# Patient Record
Sex: MALE | Race: WHITE | NOT HISPANIC OR LATINO | Employment: FULL TIME | URBAN - METROPOLITAN AREA
[De-identification: names, ages, dates, MRNs, and addresses within clinical notes are randomized per-mention and may not be internally consistent; named-entity substitution may affect disease eponyms.]

---

## 2019-09-06 ENCOUNTER — HOSPITAL ENCOUNTER (OUTPATIENT)
Dept: GENERAL RADIOLOGY | Facility: HOSPITAL | Age: 54
Discharge: HOME | End: 2019-09-06

## 2019-09-06 ENCOUNTER — HOSPITAL ENCOUNTER (OUTPATIENT)
Dept: CARDIOLOGY | Facility: HOSPITAL | Age: 54
Discharge: HOME | End: 2019-09-06
Admitting: SPECIALIST

## 2019-09-06 ENCOUNTER — TRANSCRIBE ORDERS (OUTPATIENT)
Dept: CARDIOLOGY | Facility: HOSPITAL | Age: 54
End: 2019-09-06

## 2019-09-06 DIAGNOSIS — R06.09 DOE (DYSPNEA ON EXERTION): ICD-10-CM

## 2019-09-06 DIAGNOSIS — R06.09 DOE (DYSPNEA ON EXERTION): Primary | ICD-10-CM

## 2019-09-06 DIAGNOSIS — R05.9 COUGH: ICD-10-CM

## 2019-09-06 PROCEDURE — 93010 ELECTROCARDIOGRAM REPORT: CPT | Performed by: INTERNAL MEDICINE

## 2019-09-06 PROCEDURE — 71046 X-RAY EXAM CHEST 2 VIEWS: CPT

## 2019-09-06 PROCEDURE — 93005 ELECTROCARDIOGRAM TRACING: CPT | Performed by: SPECIALIST

## 2019-09-23 ENCOUNTER — PREP FOR SURGERY (OUTPATIENT)
Dept: OTHER | Facility: HOSPITAL | Age: 54
End: 2019-09-23

## 2019-09-23 DIAGNOSIS — Z12.11 SCREEN FOR COLON CANCER: Primary | ICD-10-CM

## 2019-09-26 PROBLEM — Z12.11 SCREEN FOR COLON CANCER: Status: ACTIVE | Noted: 2019-09-26

## 2019-12-02 ENCOUNTER — TELEPHONE (OUTPATIENT)
Dept: SURGERY | Facility: CLINIC | Age: 54
End: 2019-12-02

## 2020-09-23 ENCOUNTER — OFFICE VISIT (OUTPATIENT)
Dept: FAMILY MEDICINE CLINIC | Facility: CLINIC | Age: 55
End: 2020-09-23

## 2020-09-23 VITALS
SYSTOLIC BLOOD PRESSURE: 126 MMHG | HEART RATE: 90 BPM | HEIGHT: 78 IN | TEMPERATURE: 97.8 F | OXYGEN SATURATION: 98 % | DIASTOLIC BLOOD PRESSURE: 70 MMHG | WEIGHT: 289.6 LBS | BODY MASS INDEX: 33.51 KG/M2

## 2020-09-23 DIAGNOSIS — E87.5 HYPERKALEMIA: ICD-10-CM

## 2020-09-23 DIAGNOSIS — R79.89 ABNORMAL CBC: Primary | ICD-10-CM

## 2020-09-23 DIAGNOSIS — R73.9 ELEVATED BLOOD SUGAR: ICD-10-CM

## 2020-09-23 DIAGNOSIS — Z72.0 TOBACCO ABUSE: ICD-10-CM

## 2020-09-23 DIAGNOSIS — Z76.89 ENCOUNTER TO ESTABLISH CARE: Primary | ICD-10-CM

## 2020-09-23 DIAGNOSIS — M25.562 CHRONIC PAIN OF LEFT KNEE: ICD-10-CM

## 2020-09-23 DIAGNOSIS — G89.29 CHRONIC PAIN OF LEFT KNEE: ICD-10-CM

## 2020-09-23 LAB
ALBUMIN SERPL-MCNC: 4.6 G/DL (ref 3.5–5.2)
ALBUMIN/GLOB SERPL: 1.9 G/DL
ALP SERPL-CCNC: 111 U/L (ref 39–117)
ALT SERPL W P-5'-P-CCNC: 36 U/L (ref 1–41)
ANION GAP SERPL CALCULATED.3IONS-SCNC: 10.5 MMOL/L (ref 5–15)
ARTICHOKE IGE QN: 115 MG/DL (ref 0–100)
AST SERPL-CCNC: 23 U/L (ref 1–40)
BILIRUB SERPL-MCNC: 0.5 MG/DL (ref 0–1.2)
BUN SERPL-MCNC: 15 MG/DL (ref 6–20)
BUN/CREAT SERPL: 15.8 (ref 7–25)
CALCIUM SPEC-SCNC: 9.5 MG/DL (ref 8.6–10.5)
CHLORIDE SERPL-SCNC: 100 MMOL/L (ref 98–107)
CHOLEST SERPL-MCNC: 231 MG/DL (ref 0–200)
CO2 SERPL-SCNC: 25.5 MMOL/L (ref 22–29)
CREAT SERPL-MCNC: 0.95 MG/DL (ref 0.76–1.27)
ERYTHROCYTE [DISTWIDTH] IN BLOOD BY AUTOMATED COUNT: 12.1 % (ref 12.3–15.4)
GFR SERPL CREATININE-BSD FRML MDRD: 83 ML/MIN/1.73
GLOBULIN UR ELPH-MCNC: 2.4 GM/DL
GLUCOSE SERPL-MCNC: 365 MG/DL (ref 65–99)
HCT VFR BLD AUTO: 52.8 % (ref 37.5–51)
HDLC SERPL-MCNC: 35 MG/DL (ref 40–60)
HGB BLD-MCNC: 17.4 G/DL (ref 13–17.7)
LDLC SERPL CALC-MCNC: ABNORMAL MG/DL
LDLC/HDLC SERPL: ABNORMAL {RATIO}
LYMPHOCYTES # BLD AUTO: 2.9 10*3/MM3 (ref 0.7–3.1)
LYMPHOCYTES NFR BLD AUTO: 32 % (ref 19.6–45.3)
MCH RBC QN AUTO: 30.1 PG (ref 26.6–33)
MCHC RBC AUTO-ENTMCNC: 33 G/DL (ref 31.5–35.7)
MCV RBC AUTO: 91 FL (ref 79–97)
MONOCYTES # BLD AUTO: 0.6 10*3/MM3 (ref 0.1–0.9)
MONOCYTES NFR BLD AUTO: 6.7 % (ref 5–12)
NEUTROPHILS NFR BLD AUTO: 5.6 10*3/MM3 (ref 1.7–7)
NEUTROPHILS NFR BLD AUTO: 61.3 % (ref 42.7–76)
PLATELET # BLD AUTO: 235 10*3/MM3 (ref 140–450)
PMV BLD AUTO: 7.5 FL (ref 6–12)
POTASSIUM SERPL-SCNC: 5.3 MMOL/L (ref 3.5–5.2)
PROT SERPL-MCNC: 7 G/DL (ref 6–8.5)
RBC # BLD AUTO: 5.8 10*6/MM3 (ref 4.14–5.8)
SODIUM SERPL-SCNC: 136 MMOL/L (ref 136–145)
TRIGL SERPL-MCNC: 490 MG/DL (ref 0–150)
TSH SERPL DL<=0.05 MIU/L-ACNC: 0.96 UIU/ML (ref 0.27–4.2)
VLDLC SERPL-MCNC: ABNORMAL MG/DL
WBC # BLD AUTO: 9.1 10*3/MM3 (ref 3.4–10.8)

## 2020-09-23 PROCEDURE — 83721 ASSAY OF BLOOD LIPOPROTEIN: CPT | Performed by: NURSE PRACTITIONER

## 2020-09-23 PROCEDURE — 80061 LIPID PANEL: CPT | Performed by: NURSE PRACTITIONER

## 2020-09-23 PROCEDURE — 80053 COMPREHEN METABOLIC PANEL: CPT | Performed by: NURSE PRACTITIONER

## 2020-09-23 PROCEDURE — 84443 ASSAY THYROID STIM HORMONE: CPT | Performed by: NURSE PRACTITIONER

## 2020-09-23 PROCEDURE — 73560 X-RAY EXAM OF KNEE 1 OR 2: CPT | Performed by: NURSE PRACTITIONER

## 2020-09-23 PROCEDURE — 99204 OFFICE O/P NEW MOD 45 MIN: CPT | Performed by: NURSE PRACTITIONER

## 2020-09-23 PROCEDURE — 36415 COLL VENOUS BLD VENIPUNCTURE: CPT | Performed by: NURSE PRACTITIONER

## 2020-09-23 PROCEDURE — 85025 COMPLETE CBC W/AUTO DIFF WBC: CPT | Performed by: NURSE PRACTITIONER

## 2020-09-23 NOTE — PROGRESS NOTES
Subjective   Marbin Hawkins is a 55 y.o. male.     History of Present Illness   Here today to establish care, prev pcp Dr. Schofield, changing office, he is , he works as , he is expecting one child he stays active, recently lost weight, he states diet is ok. He does smoke 2-3 ppd for about 35 years. He is ready to quit. He has never tried anything for quitting, he denies SOA, cough.   He states he has hx of elevated blood sugar, but states he has lost about 50 lbs. He is fasting today.   Also c/o left knee pain, states he felt pop in knee about 2 years ago, medial knee, he states worsening in the past couple of weeks. Also has left elbow pain, wondering about arthritis. He tried naproxen OTC which helps some. He does have pain with ambulation.    he has never had colonoscopy, last prostate exam last year, normal.       The following portions of the patient's history were reviewed and updated as appropriate: allergies, current medications, past family history, past medical history, past social history, past surgical history and problem list.    Review of Systems   Constitutional: Negative for chills, diaphoresis and fever.   Respiratory: Negative for cough and shortness of breath.    Cardiovascular: Negative for chest pain.   Musculoskeletal: Negative for arthralgias and myalgias.   Neurological: Negative for dizziness, light-headedness and headache.   All other systems reviewed and are negative.      Objective   Physical Exam  Vitals signs and nursing note reviewed.   Constitutional:       Appearance: He is well-developed.   HENT:      Head: Normocephalic.   Eyes:      Pupils: Pupils are equal, round, and reactive to light.   Cardiovascular:      Rate and Rhythm: Normal rate and regular rhythm.      Heart sounds: Normal heart sounds.   Pulmonary:      Effort: Pulmonary effort is normal.      Breath sounds: Normal breath sounds.   Musculoskeletal:      Right knee: He exhibits normal range of motion, no  swelling and no effusion. No tenderness found.      Left knee: He exhibits swelling. He exhibits normal range of motion and no effusion. Tenderness found. Medial joint line tenderness noted.   Skin:     General: Skin is warm and dry.   Neurological:      Mental Status: He is alert and oriented to person, place, and time.   Psychiatric:         Behavior: Behavior normal.         Judgment: Judgment normal.       Left knee xray today for pain, no comparison shows NAD,awaiting radiology over read     Assessment/Plan   Marbin was seen today for establish care and knee pain.    Diagnoses and all orders for this visit:    Encounter to establish care  -     CBC & Differential  -     Comprehensive Metabolic Panel  -     Lipid Panel  -     TSH  -     XR Knee 1 or 2 View Left (In Office)  -     Ambulatory Referral to Orthopedic Surgery  -     CBC Auto Differential    Chronic pain of left knee  -     CBC & Differential  -     Comprehensive Metabolic Panel  -     Lipid Panel  -     TSH  -     XR Knee 1 or 2 View Left (In Office)  -     Ambulatory Referral to Orthopedic Surgery  -     CBC Auto Differential    Left elbow pain  -     CBC & Differential  -     Comprehensive Metabolic Panel  -     Lipid Panel  -     TSH  -     XR Knee 1 or 2 View Left (In Office)  -     Ambulatory Referral to Orthopedic Surgery  -     CBC Auto Differential    Elevated blood sugar  -     CBC & Differential  -     Comprehensive Metabolic Panel  -     Lipid Panel  -     TSH  -     XR Knee 1 or 2 View Left (In Office)  -     Ambulatory Referral to Orthopedic Surgery  -     CBC Auto Differential    Tobacco abuse  -     CBC & Differential  -     Comprehensive Metabolic Panel  -     Lipid Panel  -     TSH  -     XR Knee 1 or 2 View Left (In Office)  -     Ambulatory Referral to Orthopedic Surgery  -     CBC Auto Differential    Other orders  -     varenicline (Chantix Starting Month Pak) 0.5 MG X 11 & 1 MG X 42 tablet; Take 0.5 mg one daily on days 1-3 and  and 0.5 mg twice daily on days 4-7.Then 1 mg twice daily for a total of 12 weeks.          Marbin Hawkins  reports that he has been smoking. He has a 105.00 pack-year smoking history. He has never used smokeless tobacco.. I have educated him on the risk of diseases from using tobacco products such as cancer, COPD and heart diease.     I advised him to quit and he is willing to quit. We have discussed the following method/s for tobacco cessation:  Education Material.  Together we have set a quit date for 1 month from today.  He will follow up with me in 10 weeks or sooner to check on his progress.    I spent 5 minutes counseling the patient.     Labs today will call with results.   Encouraged rest, ice, elevation, may try knee brace OTC as needed, may cont naproxen as needed for pain.   Refer to ortho will call with appt.   Knee xray today will call with results.   Trial chantix starting pack, educated about SE, call with problems, call for refill for continuing pack.   Deferred screening colonoscopy and psa today, educated about colon and prostate cancer screenings.   Patient agrees with plan of care and understands instructions. Call if worsening symptoms or any problems or concerns.

## 2020-09-23 NOTE — PATIENT INSTRUCTIONS
Labs today will call with results.   Encouraged rest, ice, elevation, may try knee brace OTC as needed, may cont naproxen as needed for pain.   Refer to ortho will call with appt.   Knee xray today will call with results.   Trial chantix starting pack, educated about SE, call with problems, call for refill for continuing pack.   Deferred screening colonoscopy and psa today, educated about colon and prostate cancer screenings.   Patient agrees with plan of care and understands instructions. Call if worsening symptoms or any problems or concerns.

## 2020-09-24 DIAGNOSIS — E11.65 TYPE 2 DIABETES MELLITUS WITH HYPERGLYCEMIA, WITHOUT LONG-TERM CURRENT USE OF INSULIN (HCC): Primary | ICD-10-CM

## 2020-09-24 LAB — HBA1C MFR BLD: 10.9 % (ref 4.8–5.6)

## 2020-09-24 PROCEDURE — 83036 HEMOGLOBIN GLYCOSYLATED A1C: CPT | Performed by: NURSE PRACTITIONER

## 2020-09-28 DIAGNOSIS — E78.2 MIXED HYPERLIPIDEMIA: Primary | ICD-10-CM

## 2020-09-28 RX ORDER — ATORVASTATIN CALCIUM 10 MG/1
10 TABLET, FILM COATED ORAL NIGHTLY
Qty: 30 TABLET | Refills: 3 | Status: SHIPPED | OUTPATIENT
Start: 2020-09-28 | End: 2021-01-13 | Stop reason: SDUPTHER

## 2020-10-05 ENCOUNTER — OFFICE VISIT (OUTPATIENT)
Dept: ORTHOPEDIC SURGERY | Facility: CLINIC | Age: 55
End: 2020-10-05

## 2020-10-05 VITALS — WEIGHT: 288.8 LBS | BODY MASS INDEX: 33.41 KG/M2 | HEIGHT: 78 IN | TEMPERATURE: 96.8 F

## 2020-10-05 DIAGNOSIS — M76.892 PES ANSERINUS TENDINITIS OF LEFT LOWER EXTREMITY: Primary | ICD-10-CM

## 2020-10-05 DIAGNOSIS — R52 PAIN: ICD-10-CM

## 2020-10-05 PROCEDURE — 73562 X-RAY EXAM OF KNEE 3: CPT | Performed by: ORTHOPAEDIC SURGERY

## 2020-10-05 PROCEDURE — 99203 OFFICE O/P NEW LOW 30 MIN: CPT | Performed by: ORTHOPAEDIC SURGERY

## 2020-10-05 NOTE — PROGRESS NOTES
General Exam    Patient: Marbin Hawkins    YOB: 1965    Medical Record Number: [id number]        History of Present Illness:     55 y.o. male patient who presents for evaluation of his left knee.  Patient states he has had some medial knee pain that travels up his medial thigh.  He states this is been ongoing for a couple years but really he just noticed it getting worse over the past few months.  He states he has increased his activity level the last few months and he is lost about 45 pounds doing so.  He states stretching his leg in extension makes the symptoms worse as well as with increased activity.  He states with prolonged sitting he feels he can has not loosen his knee up.  Overall the rest does make it better.  He has taken some naproxen which has helped his symptoms as well.  He denies any radiation of pain down his leg no feelings of instability.    Denies any numbness or tingling.  Denies any fevers, cough or shortness of breath.    Allergies: No Known Allergies    Home Medications:      Current Outpatient Medications:   •  atorvastatin (Lipitor) 10 MG tablet, Take 1 tablet by mouth Every Night., Disp: 30 tablet, Rfl: 3  •  metFORMIN (GLUCOPHAGE) 500 MG tablet, Take 1 tablet by mouth 2 (Two) Times a Day With Meals., Disp: 60 tablet, Rfl: 3  •  varenicline (Chantix Starting Month Pak) 0.5 MG X 11 & 1 MG X 42 tablet, Take 0.5 mg one daily on days 1-3 and and 0.5 mg twice daily on days 4-7.Then 1 mg twice daily for a total of 12 weeks., Disp: 52 tablet, Rfl: 0    No past medical history on file.    Past Surgical History:   Procedure Laterality Date   • APPENDECTOMY     • CARPAL TUNNEL RELEASE Left        Social History     Occupational History   • Not on file   Tobacco Use   • Smoking status: Current Every Day Smoker     Packs/day: 3.00     Years: 35.00     Pack years: 105.00   • Smokeless tobacco: Never Used   Substance and Sexual Activity   • Alcohol use: Never     Frequency: Never   • Drug  "use: Never   • Sexual activity: Not on file      Social History     Social History Narrative   • Not on file       Family History   Problem Relation Age of Onset   • Alzheimer's disease Mother    • Heart attack Father    • Uterine cancer Sister    • Lung cancer Brother    • No Known Problems Brother    • No Known Problems Brother        Review of Systems:      Constitutional: Denies fever, shaking or chills   Eyes: Denies change in visual acuity   HEENT: Denies nasal congestion or sore throat   Respiratory: Denies cough or shortness of breath   Cardiovascular: Denies chest pain or edema  Endocrine: Denies tremors, palpitations, intolerance of heat or cold, polyuria, polydipsia.  GI: Denies abdominal pain, nausea, vomiting, bloody stools or diarrhea  : Denies frequency, urgency, incontinence, retention, or nocturia.  Musculoskeletal: Denies numbness, tingling or loss of motor function except as above  Integument: Denies rash, lesion or ulceration   Neurologic: Denies headache or focal weakness, deficits  Heme: Denies spontaneous or excessive bleeding, epistaxis, hematuria, melena, fatigue, enlarged or tender lymph nodes.      All other pertinent positives and negatives as noted above in HPI.    Physical Exam: 55 y.o. male    Vitals:    10/05/20 0906   Temp: 96.8 °F (36 °C)   TempSrc: Temporal   Weight: 131 kg (288 lb 12.8 oz)   Height: 203.2 cm (80\")       General:  Patient is awake and alert.  Appears in no acute distress or discomfort.    Psych:  Affect and demeanor are appropriate.    Eyes:  Conjunctiva and sclera appear grossly normal.  Eyes track well and EOM seem to be intact.    Ears:  No gross abnormalities.  Hearing adequate for the exam.    Cardiovascular:  Regular rate and rhythm.    Lungs:  Good chest expansion.  Breathing unlabored.    Lymph:  No palpable masses or adenopathy in the affected extremity    Musculoskeletal/Extremities:      Left lower extremity: Positive tenderness to palpation along his " hamstring especially the insertion at the peds anserine.  He does have full range of motion 0-1 20+.  He has pain with resisted active knee flexion.  No tenderness to the joint line.  Skin is intact.  Minimal swelling along the insertion of the hamstrings.  Ankle and toes up and down.  Sensation is intact distally light touch.  Skin is intact.         Radiology:       4 views of his left knee were taken the office today.  Taken to evaluate the patient's    AP, lateral, notch view, and sunrise view demonstrate preservation of joint space no osteophyte formation.  No evidence of acute fracture lesions appreciated.    Complaint/s.    AP pelvis was taken the office today to evaluate the patient's complaints.:  Preservation of bilateral hip joint space.  No evidence of fractures or lesions appreciated.     No imaging for comparison.    Assessment/Plan:      Patient has insertional site hamstring tendinitis/pes anserine tendinitis    I discussed the imaging and clinical findings with the patient today.  Feel it is more of a soft tissue overuse injury.  We discussed conservative management consisting of anti-inflammatories, rest, physical therapy.  We will proceed with him continue taken some naproxen over the next few weeks along with doing some formal physical therapy and backing off his exercise activities.  We can consider in the future if needed steroid injection.  The patient is not getting better over the next 6 to 8 weeks he will follow-up.    Otherwise patient will follow-up as needed.               All questions were answered.  Patient understands and agrees with the plan.    Mason Henderson MD    10/05/2020    CC to Shilpi Chiu APRN    Procedures

## 2021-01-12 ENCOUNTER — TELEPHONE (OUTPATIENT)
Dept: FAMILY MEDICINE CLINIC | Facility: CLINIC | Age: 56
End: 2021-01-12

## 2021-01-12 RX ORDER — ATORVASTATIN CALCIUM 10 MG/1
10 TABLET, FILM COATED ORAL NIGHTLY
Qty: 30 TABLET | Refills: 3 | Status: CANCELLED | OUTPATIENT
Start: 2021-01-12

## 2021-01-12 NOTE — TELEPHONE ENCOUNTER
Caller: kinza li    Relationship: Emergency Contact    Best call back number: [phone]    Medication needed:   Requested Prescriptions     Pending Prescriptions Disp Refills   • atorvastatin (Lipitor) 10 MG tablet 30 tablet 3     Sig: Take 1 tablet by mouth Every Night.       When do you need the refill by: 01/15/21    What details did the patient provide when requesting the medication: PATIENT'S WIFE SAID THE PHARMACY ONLY GAVE HIM A PARTIAL REFILL    Does the patient have less than a 3 day supply:  [x] Yes  [] No    What is the patient's preferred pharmacy: [address] ([facility]), [place] - [address] [facility] [phone] [facility] [phone] FX

## 2021-01-13 RX ORDER — ATORVASTATIN CALCIUM 10 MG/1
10 TABLET, FILM COATED ORAL NIGHTLY
Qty: 30 TABLET | Refills: 3 | Status: SHIPPED | OUTPATIENT
Start: 2021-01-13 | End: 2021-01-25 | Stop reason: SDUPTHER

## 2021-01-19 NOTE — TELEPHONE ENCOUNTER
Caller: Marbin Hawkins    Relationship: Self    Best call back number:[phone]   Medication needed:   Requested Prescriptions     Pending Prescriptions Disp Refills   • metFORMIN (GLUCOPHAGE) 500 MG tablet 60 tablet 3     Sig: Take 1 tablet by mouth 2 (Two) Times a Day With Meals.       When do you need the refill by: TODAY   What details did the patient provide when requesting the medication:PATIENT IS OUT OF MEDICATION    Does the patient have less than a 3 day supply:  [x] Yes  [] No    What is the patient's preferred pharmacy: [address] ([facility]), [place] - [address] [facility] [phone] [facility] [phone]

## 2021-01-25 ENCOUNTER — TELEPHONE (OUTPATIENT)
Dept: FAMILY MEDICINE CLINIC | Facility: CLINIC | Age: 56
End: 2021-01-25

## 2021-01-25 RX ORDER — ATORVASTATIN CALCIUM 10 MG/1
10 TABLET, FILM COATED ORAL NIGHTLY
Qty: 30 TABLET | Refills: 3 | Status: SHIPPED | OUTPATIENT
Start: 2021-01-25 | End: 2021-02-03 | Stop reason: SDUPTHER

## 2021-01-25 NOTE — TELEPHONE ENCOUNTER
PATIENT'S WIFE CALLED STATING THAT THE PATIENT NEEDS TO CHANGE BLOOD SUGAR MEDICATION DUE TO HIS CURRENT ONE NOT BEING SUPPORTED ANY MORE.    PLEASE ADVISE  [phone]     [facility] [address] [place] [address] ([facility]), [place] - [address] [facility] [place] - [phone] [facility] [phone]   [phone]

## 2021-01-27 NOTE — TELEPHONE ENCOUNTER
Please have him make appt to discuss and we need to recheck labs, his last visit was 9/2020, due for a1c recheck.

## 2021-02-03 ENCOUNTER — OFFICE VISIT (OUTPATIENT)
Dept: FAMILY MEDICINE CLINIC | Facility: CLINIC | Age: 56
End: 2021-02-03

## 2021-02-03 VITALS
HEART RATE: 85 BPM | DIASTOLIC BLOOD PRESSURE: 70 MMHG | SYSTOLIC BLOOD PRESSURE: 120 MMHG | TEMPERATURE: 98 F | OXYGEN SATURATION: 99 % | WEIGHT: 299 LBS | BODY MASS INDEX: 34.59 KG/M2 | HEIGHT: 78 IN

## 2021-02-03 DIAGNOSIS — E11.65 TYPE 2 DIABETES MELLITUS WITH HYPERGLYCEMIA, WITHOUT LONG-TERM CURRENT USE OF INSULIN (HCC): ICD-10-CM

## 2021-02-03 DIAGNOSIS — E78.5 HYPERLIPIDEMIA, UNSPECIFIED HYPERLIPIDEMIA TYPE: ICD-10-CM

## 2021-02-03 DIAGNOSIS — Z72.0 TOBACCO ABUSE: ICD-10-CM

## 2021-02-03 DIAGNOSIS — Z00.00 ANNUAL PHYSICAL EXAM: Primary | ICD-10-CM

## 2021-02-03 PROCEDURE — 71046 X-RAY EXAM CHEST 2 VIEWS: CPT | Performed by: NURSE PRACTITIONER

## 2021-02-03 PROCEDURE — 93000 ELECTROCARDIOGRAM COMPLETE: CPT | Performed by: NURSE PRACTITIONER

## 2021-02-03 PROCEDURE — 99396 PREV VISIT EST AGE 40-64: CPT | Performed by: NURSE PRACTITIONER

## 2021-02-03 RX ORDER — ATORVASTATIN CALCIUM 10 MG/1
10 TABLET, FILM COATED ORAL NIGHTLY
Qty: 30 TABLET | Refills: 6 | Status: SHIPPED | OUTPATIENT
Start: 2021-02-03 | End: 2021-12-13 | Stop reason: SDUPTHER

## 2021-02-03 NOTE — PROGRESS NOTES
"Chief Complaint  Annual Exam and Diabetes (says was told metformin causes cancer)    Subjective          Marbin Hawkins presents to [facility] for   History of Present Illness  Here today for annual exam, he is not fasting today.  He has been working diet, does exercise occasionally.He did not take chantix, he has cut back on smoking. He is UTD on dentist. UTD on eye exam.    with DM, prev taking metformin 500mg bid, he states he stopped this d/t recall and fear of causing cancer. Last a1c 9/2020 10.9, new diagnosis. States he stopped metformin about 2 weeks ago. States BS in am usually 120s-140, states he did not check today. He is not fasting today.   With HLD, taking lipitor 10mg daily, he is taking daily, tolerating well.   He states he does have family hx of heart attack in father in 50s.   He does see urology he had psa this morning. He has never had colonoscopy. Denies family hx of colon cancer.       Objective   Vital Signs:   /70 (BP Location: Right arm, Patient Position: Sitting, Cuff Size: Large Adult)   Pulse 85   Temp 98 °F (36.7 °C) (Infrared)   Ht 203.2 cm (80\")   Wt 136 kg (299 lb)   SpO2 99%   BMI 32.85 kg/m²     Physical Exam  Vitals signs and nursing note reviewed.   Constitutional:       Appearance: He is well-developed.   HENT:      Head: Normocephalic.   Eyes:      Pupils: Pupils are equal, round, and reactive to light.   Cardiovascular:      Rate and Rhythm: Normal rate and regular rhythm.      Pulses: Normal pulses.           Radial pulses are 2+ on the right side and 2+ on the left side.        Dorsalis pedis pulses are 2+ on the right side and 2+ on the left side.        Posterior tibial pulses are 2+ on the right side and 2+ on the left side.      Heart sounds: Normal heart sounds.   Pulmonary:      Effort: Pulmonary effort is normal.      Breath sounds: Normal breath sounds. No decreased breath sounds, wheezing or rhonchi.   Skin:     " General: Skin is warm and dry.   Neurological:      Mental Status: He is alert and oriented to person, place, and time.      Cranial Nerves: Cranial nerves are intact.      Sensory: Sensation is intact.      Motor: Motor function is intact.      Coordination: Coordination is intact.   Psychiatric:         Behavior: Behavior normal.         Judgment: Judgment normal.        Result Review :            ECG 12 Lead    Date/Time: 2/3/2021 5:04 PM  Performed by: Shilpi Chiu APRN  Authorized by: Shilpi Chiu APRN   Comparison: compared with previous ECG from 9/6/2019  Comparison to previous ECG: Sinus rhythm   Rhythm: sinus rhythm  Rate: normal  QRS axis: normal    Clinical impression: normal ECG            cxr2v in office for physical no comparison shows ,awaiting radiology over read.       Assessment and Plan    Problem List Items Addressed This Visit        Cardiac and Vasculature    Hyperlipidemia    Relevant Medications    atorvastatin (Lipitor) 10 MG tablet    Other Relevant Orders    CBC & Differential    Comprehensive Metabolic Panel    Hemoglobin A1c    Lipid panel    MicroAlbumin, Urine, Random - Urine, Clean Catch    XR Chest 2 View (Completed)      Other Visit Diagnoses     Annual physical exam    -  Primary    Relevant Orders    CBC & Differential    Comprehensive Metabolic Panel    Hemoglobin A1c    Lipid panel    MicroAlbumin, Urine, Random - Urine, Clean Catch    XR Chest 2 View (Completed)    ECG 12 Lead    Type 2 diabetes mellitus with hyperglycemia, without long-term current use of insulin (CMS/[facility])        Relevant Medications    SITagliptin (Januvia) 100 MG tablet    Other Relevant Orders    CBC & Differential    Comprehensive Metabolic Panel    Hemoglobin A1c    Lipid panel    MicroAlbumin, Urine, Random - Urine, Clean Catch    XR Chest 2 View (Completed)    Tobacco abuse        Relevant Orders    CBC & Differential    Comprehensive Metabolic Panel    Hemoglobin A1c    Lipid panel     MicroAlbumin, Urine, Random - Urine, Clean Catch    XR Chest 2 View (Completed)          Follow Up   Return if symptoms worsen or fail to improve.  Patient was given instructions and counseling regarding his condition or for health maintenance advice. Please see specific information pulled into the AVS if appropriate.     He will return Friday for fasting labs,   ekg and cxr today,   He will let me know if he would like stress test ordered d/t family hx   Deferred colonoscopy or cologuard options today, he will call if he would like scheduled.   Encouraged smoking cessation,   Cont diet and exercise as tolerated.  Stop metformin, will trial januvia 100mg daily, coupon given, monitor bS at home, call with problems, cont lipitor.   Patient agrees with plan of care and understands instructions. Call if worsening symptoms or any problems or concerns.

## 2021-02-03 NOTE — PATIENT INSTRUCTIONS
He will return Friday for fasting labs,   ekg and cxr today,   He will let me know if he would like stress test ordered d/t family hx   Deferred colonoscopy or cologuard options today, he will call if he would like scheduled.   Encouraged smoking cessation,   Cont diet and exercise as tolerated.  Stop metformin, will trial januvia 100mg daily, coupon given, monitor bS at home, call with problems, cont lipitor.   Patient agrees with plan of care and understands instructions. Call if worsening symptoms or any problems or concerns.

## 2021-02-05 ENCOUNTER — TELEPHONE (OUTPATIENT)
Dept: FAMILY MEDICINE CLINIC | Facility: CLINIC | Age: 56
End: 2021-02-05

## 2021-02-05 ENCOUNTER — LAB (OUTPATIENT)
Dept: FAMILY MEDICINE CLINIC | Facility: CLINIC | Age: 56
End: 2021-02-05

## 2021-02-05 DIAGNOSIS — E11.65 TYPE 2 DIABETES MELLITUS WITH HYPERGLYCEMIA, WITHOUT LONG-TERM CURRENT USE OF INSULIN (HCC): ICD-10-CM

## 2021-02-05 DIAGNOSIS — E78.2 MIXED HYPERLIPIDEMIA: ICD-10-CM

## 2021-02-05 DIAGNOSIS — E87.5 HYPERKALEMIA: ICD-10-CM

## 2021-02-05 DIAGNOSIS — Z72.0 TOBACCO ABUSE: ICD-10-CM

## 2021-02-05 DIAGNOSIS — R79.89 ABNORMAL CBC: ICD-10-CM

## 2021-02-05 DIAGNOSIS — Z00.00 ANNUAL PHYSICAL EXAM: ICD-10-CM

## 2021-02-05 DIAGNOSIS — E78.5 HYPERLIPIDEMIA, UNSPECIFIED HYPERLIPIDEMIA TYPE: ICD-10-CM

## 2021-02-05 LAB
ALBUMIN SERPL-MCNC: 4.6 G/DL (ref 3.5–5.2)
ALBUMIN UR-MCNC: 0 MG/L (ref 0–20)
ALBUMIN/GLOB SERPL: 2.1 G/DL
ALP SERPL-CCNC: 76 U/L (ref 39–117)
ALT SERPL W P-5'-P-CCNC: 19 U/L (ref 1–41)
ANION GAP SERPL CALCULATED.3IONS-SCNC: 10 MMOL/L (ref 5–15)
AST SERPL-CCNC: 17 U/L (ref 1–40)
BILIRUB SERPL-MCNC: 0.7 MG/DL (ref 0–1.2)
BUN SERPL-MCNC: 9 MG/DL (ref 6–20)
BUN/CREAT SERPL: 11.1 (ref 7–25)
CALCIUM SPEC-SCNC: 9.7 MG/DL (ref 8.6–10.5)
CHLORIDE SERPL-SCNC: 103 MMOL/L (ref 98–107)
CHOLEST SERPL-MCNC: 179 MG/DL (ref 0–200)
CO2 SERPL-SCNC: 27 MMOL/L (ref 22–29)
CREAT SERPL-MCNC: 0.81 MG/DL (ref 0.76–1.27)
ERYTHROCYTE [DISTWIDTH] IN BLOOD BY AUTOMATED COUNT: 11.7 % (ref 12.3–15.4)
GFR SERPL CREATININE-BSD FRML MDRD: 99 ML/MIN/1.73
GLOBULIN UR ELPH-MCNC: 2.2 GM/DL
GLUCOSE SERPL-MCNC: 156 MG/DL (ref 65–99)
HBA1C MFR BLD: 6.3 % (ref 4.8–5.6)
HCT VFR BLD AUTO: 50.7 % (ref 37.5–51)
HDLC SERPL-MCNC: 45 MG/DL (ref 40–60)
HGB BLD-MCNC: 16.7 G/DL (ref 13–17.7)
LDLC SERPL CALC-MCNC: 92 MG/DL (ref 0–100)
LDLC/HDLC SERPL: 1.86 {RATIO}
LYMPHOCYTES # BLD AUTO: 2.9 10*3/MM3 (ref 0.7–3.1)
LYMPHOCYTES NFR BLD AUTO: 33.2 % (ref 19.6–45.3)
MCH RBC QN AUTO: 30.2 PG (ref 26.6–33)
MCHC RBC AUTO-ENTMCNC: 32.9 G/DL (ref 31.5–35.7)
MCV RBC AUTO: 91.9 FL (ref 79–97)
MONOCYTES # BLD AUTO: 0.7 10*3/MM3 (ref 0.1–0.9)
MONOCYTES NFR BLD AUTO: 7.6 % (ref 5–12)
NEUTROPHILS NFR BLD AUTO: 5.2 10*3/MM3 (ref 1.7–7)
NEUTROPHILS NFR BLD AUTO: 59.2 % (ref 42.7–76)
PLATELET # BLD AUTO: 225 10*3/MM3 (ref 140–450)
PMV BLD AUTO: 7.2 FL (ref 6–12)
POTASSIUM SERPL-SCNC: 4.9 MMOL/L (ref 3.5–5.2)
PROT SERPL-MCNC: 6.8 G/DL (ref 6–8.5)
RBC # BLD AUTO: 5.52 10*6/MM3 (ref 4.14–5.8)
SODIUM SERPL-SCNC: 140 MMOL/L (ref 136–145)
TRIGL SERPL-MCNC: 252 MG/DL (ref 0–150)
VLDLC SERPL-MCNC: 42 MG/DL (ref 5–40)
WBC # BLD AUTO: 8.7 10*3/MM3 (ref 3.4–10.8)

## 2021-02-05 PROCEDURE — 82043 UR ALBUMIN QUANTITATIVE: CPT | Performed by: NURSE PRACTITIONER

## 2021-02-05 PROCEDURE — 36415 COLL VENOUS BLD VENIPUNCTURE: CPT | Performed by: NURSE PRACTITIONER

## 2021-02-05 PROCEDURE — 83036 HEMOGLOBIN GLYCOSYLATED A1C: CPT | Performed by: NURSE PRACTITIONER

## 2021-02-05 PROCEDURE — 80061 LIPID PANEL: CPT | Performed by: NURSE PRACTITIONER

## 2021-02-05 PROCEDURE — 85025 COMPLETE CBC W/AUTO DIFF WBC: CPT | Performed by: NURSE PRACTITIONER

## 2021-02-05 PROCEDURE — 80053 COMPREHEN METABOLIC PANEL: CPT | Performed by: NURSE PRACTITIONER

## 2021-02-05 NOTE — TELEPHONE ENCOUNTER
Hub staff attempted to follow warm transfer process and was unsuccessful     Caller: Marbin Hawkins    Relationship to patient: Self    Best call back number: [phone]    Patient is needing: PATIENT RETURNING CALL HE MISSED EARLIER, WOULD LIKE CALLBACK

## 2021-02-05 NOTE — TELEPHONE ENCOUNTER
Caller: Marbin Hawkins    Relationship to patient: Self    Best call back number: [phone] (M)    Patient is needing: PATIENT RETUNING SOMEONE'S CALL COULD NOT VERIFY WHO CALLED HIM

## 2021-03-26 ENCOUNTER — BULK ORDERING (OUTPATIENT)
Dept: CASE MANAGEMENT | Facility: OTHER | Age: 56
End: 2021-03-26

## 2021-03-26 DIAGNOSIS — Z23 IMMUNIZATION DUE: ICD-10-CM

## 2021-03-30 ENCOUNTER — IMMUNIZATION (OUTPATIENT)
Dept: VACCINE CLINIC | Facility: HOSPITAL | Age: 56
End: 2021-03-30

## 2021-03-30 DIAGNOSIS — Z23 IMMUNIZATION DUE: ICD-10-CM

## 2021-03-30 PROCEDURE — 0001A: CPT | Performed by: INTERNAL MEDICINE

## 2021-03-30 PROCEDURE — 91300 HC SARSCOV02 VAC 30MCG/0.3ML IM: CPT | Performed by: INTERNAL MEDICINE

## 2021-04-20 ENCOUNTER — IMMUNIZATION (OUTPATIENT)
Dept: VACCINE CLINIC | Facility: HOSPITAL | Age: 56
End: 2021-04-20

## 2021-04-20 PROCEDURE — 0002A: CPT | Performed by: INTERNAL MEDICINE

## 2021-04-20 PROCEDURE — 91300 HC SARSCOV02 VAC 30MCG/0.3ML IM: CPT | Performed by: INTERNAL MEDICINE

## 2021-06-28 ENCOUNTER — TELEPHONE (OUTPATIENT)
Dept: FAMILY MEDICINE CLINIC | Facility: CLINIC | Age: 56
End: 2021-06-28

## 2021-06-28 DIAGNOSIS — K04.7 DENTAL ABSCESS: Primary | ICD-10-CM

## 2021-06-28 RX ORDER — AMOXICILLIN 500 MG/1
1000 CAPSULE ORAL 2 TIMES DAILY
Qty: 4 CAPSULE | Refills: 0 | Status: SHIPPED | OUTPATIENT
Start: 2021-06-28 | End: 2021-06-29

## 2021-06-28 NOTE — TELEPHONE ENCOUNTER
Caller: kinza li    Relationship: Emergency Contact    Best call back number: [phone]    Medication needed: ANTIBIOTIC AND PAIN MEDICATION      When do you need the refill by: 06/28/21    What additional details did the patient provide when requesting the medication: PATIENT'S WIFE IS CALLING TO STATE PATIENT IS EXPERIENCING TOOTH PAIN.  SHE STATES HE IS GOING TO HAVE EXTRACTIONS AND IMPLANTS BUT NEEDS SOMETHING TO CLEAR UP THE INFECTION AND PAIN  Does the patient have less than a 3 day supply:  [x] Yes  [] No    What is the patient's preferred pharmacy:      [address] ([facility]), [place] - [address] [facility] [phone] [facility] [phone]   [phone]    PLEASE ADVISE

## 2021-06-28 NOTE — TELEPHONE ENCOUNTER
He doesn't have dental insurance, he is going to [place] to have this done but will need antibiotic first

## 2021-11-02 ENCOUNTER — TELEPHONE (OUTPATIENT)
Dept: FAMILY MEDICINE CLINIC | Facility: CLINIC | Age: 56
End: 2021-11-02

## 2021-11-02 RX ORDER — NAPROXEN 500 MG/1
500 TABLET ORAL 2 TIMES DAILY WITH MEALS
Qty: 60 TABLET | Refills: 3 | Status: SHIPPED | OUTPATIENT
Start: 2021-11-02

## 2021-11-02 NOTE — TELEPHONE ENCOUNTER
Caller: Marbin Hawkins    Relationship: Self    Best call back number: [phone]    What medication are you requesting: NAPROXEN-ANOTHER DR PRESCRIBED THIS    Have you had these symptoms before:    [x] Yes  [] No    Have you been treated for these symptoms before:   [x] Yes  [] No    If a prescription is needed, what is your preferred pharmacy and phone number: [address] ([facility]), [place] - [address] [facility] [phone] [facility] [phone]

## 2021-12-13 ENCOUNTER — OFFICE VISIT (OUTPATIENT)
Dept: FAMILY MEDICINE CLINIC | Facility: CLINIC | Age: 56
End: 2021-12-13

## 2021-12-13 VITALS
TEMPERATURE: 98.2 F | HEIGHT: 78 IN | RESPIRATION RATE: 20 BRPM | OXYGEN SATURATION: 96 % | HEART RATE: 90 BPM | DIASTOLIC BLOOD PRESSURE: 70 MMHG | WEIGHT: 307 LBS | SYSTOLIC BLOOD PRESSURE: 120 MMHG | BODY MASS INDEX: 35.52 KG/M2

## 2021-12-13 DIAGNOSIS — E11.9 TYPE 2 DIABETES MELLITUS WITHOUT COMPLICATION, WITHOUT LONG-TERM CURRENT USE OF INSULIN (HCC): Primary | ICD-10-CM

## 2021-12-13 DIAGNOSIS — E78.5 HYPERLIPIDEMIA, UNSPECIFIED HYPERLIPIDEMIA TYPE: ICD-10-CM

## 2021-12-13 LAB
ALBUMIN SERPL-MCNC: 4.5 G/DL (ref 3.5–5.2)
ALBUMIN/GLOB SERPL: 2 G/DL
ALP SERPL-CCNC: 85 U/L (ref 39–117)
ALT SERPL W P-5'-P-CCNC: 17 U/L (ref 1–41)
ANION GAP SERPL CALCULATED.3IONS-SCNC: 6.5 MMOL/L (ref 5–15)
AST SERPL-CCNC: 14 U/L (ref 1–40)
BILIRUB SERPL-MCNC: 0.6 MG/DL (ref 0–1.2)
BUN SERPL-MCNC: 11 MG/DL (ref 6–20)
BUN/CREAT SERPL: 15.3 (ref 7–25)
CALCIUM SPEC-SCNC: 9.5 MG/DL (ref 8.6–10.5)
CHLORIDE SERPL-SCNC: 104 MMOL/L (ref 98–107)
CHOLEST SERPL-MCNC: 193 MG/DL (ref 0–200)
CO2 SERPL-SCNC: 27.5 MMOL/L (ref 22–29)
CREAT SERPL-MCNC: 0.72 MG/DL (ref 0.76–1.27)
ERYTHROCYTE [DISTWIDTH] IN BLOOD BY AUTOMATED COUNT: 12.1 % (ref 12.3–15.4)
GFR SERPL CREATININE-BSD FRML MDRD: 113 ML/MIN/1.73
GLOBULIN UR ELPH-MCNC: 2.3 GM/DL
GLUCOSE SERPL-MCNC: 219 MG/DL (ref 65–99)
HBA1C MFR BLD: 7.35 % (ref 4.8–5.6)
HCT VFR BLD AUTO: 48.3 % (ref 37.5–51)
HDLC SERPL-MCNC: 38 MG/DL (ref 40–60)
HGB BLD-MCNC: 16.1 G/DL (ref 13–17.7)
LDLC SERPL CALC-MCNC: 109 MG/DL (ref 0–100)
LDLC/HDLC SERPL: 2.68 {RATIO}
LYMPHOCYTES # BLD AUTO: 2.7 10*3/MM3 (ref 0.7–3.1)
LYMPHOCYTES NFR BLD AUTO: 40.9 % (ref 19.6–45.3)
MCH RBC QN AUTO: 29.9 PG (ref 26.6–33)
MCHC RBC AUTO-ENTMCNC: 33.3 G/DL (ref 31.5–35.7)
MCV RBC AUTO: 89.7 FL (ref 79–97)
MONOCYTES # BLD AUTO: 0.4 10*3/MM3 (ref 0.1–0.9)
MONOCYTES NFR BLD AUTO: 5.9 % (ref 5–12)
NEUTROPHILS NFR BLD AUTO: 3.5 10*3/MM3 (ref 1.7–7)
NEUTROPHILS NFR BLD AUTO: 53.2 % (ref 42.7–76)
PLATELET # BLD AUTO: 232 10*3/MM3 (ref 140–450)
PMV BLD AUTO: 7.4 FL (ref 6–12)
POTASSIUM SERPL-SCNC: 4.6 MMOL/L (ref 3.5–5.2)
PROT SERPL-MCNC: 6.8 G/DL (ref 6–8.5)
RBC # BLD AUTO: 5.39 10*6/MM3 (ref 4.14–5.8)
SODIUM SERPL-SCNC: 138 MMOL/L (ref 136–145)
TRIGL SERPL-MCNC: 265 MG/DL (ref 0–150)
TSH SERPL DL<=0.05 MIU/L-ACNC: 1.11 UIU/ML (ref 0.27–4.2)
VLDLC SERPL-MCNC: 46 MG/DL (ref 5–40)
WBC NRBC COR # BLD: 6.5 10*3/MM3 (ref 3.4–10.8)

## 2021-12-13 PROCEDURE — 84443 ASSAY THYROID STIM HORMONE: CPT | Performed by: NURSE PRACTITIONER

## 2021-12-13 PROCEDURE — 83036 HEMOGLOBIN GLYCOSYLATED A1C: CPT | Performed by: NURSE PRACTITIONER

## 2021-12-13 PROCEDURE — 80061 LIPID PANEL: CPT | Performed by: NURSE PRACTITIONER

## 2021-12-13 PROCEDURE — 85025 COMPLETE CBC W/AUTO DIFF WBC: CPT | Performed by: NURSE PRACTITIONER

## 2021-12-13 PROCEDURE — 99214 OFFICE O/P EST MOD 30 MIN: CPT | Performed by: NURSE PRACTITIONER

## 2021-12-13 PROCEDURE — 80053 COMPREHEN METABOLIC PANEL: CPT | Performed by: NURSE PRACTITIONER

## 2021-12-13 RX ORDER — ATORVASTATIN CALCIUM 10 MG/1
10 TABLET, FILM COATED ORAL NIGHTLY
Qty: 30 TABLET | Refills: 6 | Status: SHIPPED | OUTPATIENT
Start: 2021-12-13

## 2021-12-13 RX ORDER — TESTOSTERONE CYPIONATE 100 MG/ML
100 INJECTION, SOLUTION INTRAMUSCULAR
COMMUNITY
Start: 2021-09-23

## 2021-12-13 NOTE — PATIENT INSTRUCTIONS
Labs today will call with results.   Handout for DM education classes given,   Work on low sugar and carb diet, exercise such as walking as tolerated.   Cont to monitor BS at home,   Pending labs will resume meds.   Patient agrees with plan of care and understands instructions. Call if worsening symptoms or any problems or concerns.

## 2021-12-13 NOTE — PROGRESS NOTES
"Chief Complaint  Diabetes (been out of Januvia months)    Subjective          Marbin Hawkins presents to [facility]  History of Present Illness  Here today for f/u, with DM. [facility], last a1c 2/2021 6.3, he check BS at home at times, usually 170s-180s.  he states he has gained weight. He is working on diet.   With HLD prev taking lipitor 10mg daily, he is not fasting today. He stopped about 2 months ago.         Objective   Vital Signs:   /70 (BP Location: Left arm, Patient Position: Sitting)   Pulse 90   Temp 98.2 °F (36.8 °C) (Infrared)   Resp 20   Ht 203.2 cm (80\")   Wt (!) 139 kg (307 lb)   SpO2 96%   BMI 33.73 kg/m²     Physical Exam  Vitals and nursing note reviewed.   Constitutional:       Appearance: He is well-developed.   HENT:      Head: Normocephalic.   Eyes:      Pupils: Pupils are equal, round, and reactive to light.   Cardiovascular:      Rate and Rhythm: Normal rate and regular rhythm.      Heart sounds: Normal heart sounds.   Pulmonary:      Effort: Pulmonary effort is normal.      Breath sounds: Normal breath sounds.   Skin:     General: Skin is warm and dry.   Neurological:      Mental Status: He is alert and oriented to person, place, and time.   Psychiatric:         Behavior: Behavior normal.         Judgment: Judgment normal.        Result Review :                 Assessment and Plan    Diagnoses and all orders for this visit:    1. Type 2 diabetes mellitus without complication, without long-term current use of insulin (HCC) (Primary)  -     Comprehensive Metabolic Panel  -     CBC & Differential  -     Lipid Panel  -     TSH  -     Hemoglobin A1c    2. Hyperlipidemia, unspecified hyperlipidemia type  -     Comprehensive Metabolic Panel  -     CBC & Differential  -     Lipid Panel  -     TSH  -     Hemoglobin A1c        Follow Up   Return in about 3 months (around 3/13/2022), or if symptoms worsen or fail to " improve, for Recheck.  Patient was given instructions and counseling regarding his condition or for health maintenance advice. Please see specific information pulled into the AVS if appropriate.     Labs today will call with results.   Handout for DM education classes given,   Work on low sugar and carb diet, exercise such as walking as tolerated.   Cont to monitor BS at home,   Pending labs will resume meds.   Patient agrees with plan of care and understands instructions. Call if worsening symptoms or any problems or concerns.

## 2022-02-02 ENCOUNTER — TELEPHONE (OUTPATIENT)
Dept: FAMILY MEDICINE CLINIC | Facility: CLINIC | Age: 57
End: 2022-02-02

## 2022-02-02 NOTE — TELEPHONE ENCOUNTER
Caller: Marbin Hawkins    Relationship: Self    Best call back number: [phone]    What was the call regarding: PATIENT STATED THAT WHEN PATIENT SPOKE WITH HIS PHARMACY, HE WAS TOLD THE PRESCRIPTION FOR CANNOT BE FILLED UNTIL MS ROJAS MORILLO IS ABLE TO CALL HIS INSURANCE, PASSPORT, AND TELL THEM AN EXPLANATION OF WHY PATIENT IS ON THE MEDICATION. PLEASE ADVISE.     Do you require a callback: IF ANY QUESTIONS

## 2024-08-24 ENCOUNTER — HOSPITAL ENCOUNTER (OUTPATIENT)
Facility: HOSPITAL | Age: 59
Setting detail: OBSERVATION
Discharge: HOME | End: 2024-08-27
Attending: EMERGENCY MEDICINE | Admitting: INTERNAL MEDICINE
Payer: MEDICAID

## 2024-08-24 ENCOUNTER — APPOINTMENT (OUTPATIENT)
Dept: GENERAL RADIOLOGY | Facility: HOSPITAL | Age: 59
End: 2024-08-24
Payer: MEDICAID

## 2024-08-24 DIAGNOSIS — E11.621 DIABETIC ULCER OF TOE OF RIGHT FOOT ASSOCIATED WITH TYPE 2 DIABETES MELLITUS, WITH OTHER ULCER SEVERITY: ICD-10-CM

## 2024-08-24 DIAGNOSIS — L97.519 DIABETIC ULCER OF TOE OF RIGHT FOOT ASSOCIATED WITH DIABETES MELLITUS DUE TO UNDERLYING CONDITION, UNSPECIFIED ULCER STAGE: Primary | ICD-10-CM

## 2024-08-24 DIAGNOSIS — L97.518 DIABETIC ULCER OF TOE OF RIGHT FOOT ASSOCIATED WITH TYPE 2 DIABETES MELLITUS, WITH OTHER ULCER SEVERITY: ICD-10-CM

## 2024-08-24 DIAGNOSIS — E08.621 DIABETIC ULCER OF TOE OF RIGHT FOOT ASSOCIATED WITH DIABETES MELLITUS DUE TO UNDERLYING CONDITION, UNSPECIFIED ULCER STAGE: Primary | ICD-10-CM

## 2024-08-24 PROBLEM — S90.122A: Status: ACTIVE | Noted: 2024-08-24

## 2024-08-24 PROBLEM — Z72.0 TOBACCO USE: Status: ACTIVE | Noted: 2024-08-24

## 2024-08-24 PROBLEM — E78.5 HYPERLIPIDEMIA: Status: ACTIVE | Noted: 2024-08-24

## 2024-08-24 PROBLEM — L97.509 DIABETIC FOOT ULCER: Status: ACTIVE | Noted: 2024-08-24

## 2024-08-24 LAB
ALBUMIN SERPL-MCNC: 4.2 G/DL (ref 3.5–5.2)
ALBUMIN/GLOB SERPL: 1.5 G/DL
ALP SERPL-CCNC: 79 U/L (ref 39–117)
ALT SERPL W P-5'-P-CCNC: 16 U/L (ref 1–41)
ANION GAP SERPL CALCULATED.3IONS-SCNC: 10 MMOL/L (ref 5–15)
AST SERPL-CCNC: 14 U/L (ref 1–40)
BASOPHILS # BLD AUTO: 0.03 10*3/MM3 (ref 0–0.2)
BASOPHILS NFR BLD AUTO: 0.4 % (ref 0–1.5)
BILIRUB SERPL-MCNC: 0.4 MG/DL (ref 0–1.2)
BUN SERPL-MCNC: 13 MG/DL (ref 6–20)
BUN/CREAT SERPL: 21.3 (ref 7–25)
CALCIUM SPEC-SCNC: 9.1 MG/DL (ref 8.6–10.5)
CHLORIDE SERPL-SCNC: 104 MMOL/L (ref 98–107)
CO2 SERPL-SCNC: 25 MMOL/L (ref 22–29)
CREAT SERPL-MCNC: 0.61 MG/DL (ref 0.76–1.27)
D-LACTATE SERPL-SCNC: 1.1 MMOL/L (ref 0.5–2)
DEPRECATED RDW RBC AUTO: 41 FL (ref 37–54)
EGFRCR SERPLBLD CKD-EPI 2021: 111.3 ML/MIN/1.73
EOSINOPHIL # BLD AUTO: 0.34 10*3/MM3 (ref 0–0.4)
EOSINOPHIL NFR BLD AUTO: 4.4 % (ref 0.3–6.2)
ERYTHROCYTE [DISTWIDTH] IN BLOOD BY AUTOMATED COUNT: 12.2 % (ref 12.3–15.4)
ERYTHROCYTE [SEDIMENTATION RATE] IN BLOOD: 28 MM/HR (ref 0–20)
GLOBULIN UR ELPH-MCNC: 2.8 GM/DL
GLUCOSE BLDC GLUCOMTR-MCNC: 127 MG/DL (ref 70–130)
GLUCOSE BLDC GLUCOMTR-MCNC: 168 MG/DL (ref 70–130)
GLUCOSE SERPL-MCNC: 99 MG/DL (ref 65–99)
HBA1C MFR BLD: 5.9 % (ref 4.8–5.6)
HCT VFR BLD AUTO: 42.1 % (ref 37.5–51)
HGB BLD-MCNC: 14.1 G/DL (ref 13–17.7)
HOLD SPECIMEN: NORMAL
IMM GRANULOCYTES # BLD AUTO: 0.04 10*3/MM3 (ref 0–0.05)
IMM GRANULOCYTES NFR BLD AUTO: 0.5 % (ref 0–0.5)
LYMPHOCYTES # BLD AUTO: 2.65 10*3/MM3 (ref 0.7–3.1)
LYMPHOCYTES NFR BLD AUTO: 33.9 % (ref 19.6–45.3)
MCH RBC QN AUTO: 30.7 PG (ref 26.6–33)
MCHC RBC AUTO-ENTMCNC: 33.5 G/DL (ref 31.5–35.7)
MCV RBC AUTO: 91.5 FL (ref 79–97)
MONOCYTES # BLD AUTO: 0.77 10*3/MM3 (ref 0.1–0.9)
MONOCYTES NFR BLD AUTO: 9.9 % (ref 5–12)
MRSA DNA SPEC QL NAA+PROBE: NORMAL
NEUTROPHILS NFR BLD AUTO: 3.98 10*3/MM3 (ref 1.7–7)
NEUTROPHILS NFR BLD AUTO: 50.9 % (ref 42.7–76)
NRBC BLD AUTO-RTO: 0 /100 WBC (ref 0–0.2)
PLATELET # BLD AUTO: 216 10*3/MM3 (ref 140–450)
PMV BLD AUTO: 9 FL (ref 6–12)
POTASSIUM SERPL-SCNC: 4.1 MMOL/L (ref 3.5–5.2)
PROT SERPL-MCNC: 7 G/DL (ref 6–8.5)
RBC # BLD AUTO: 4.6 10*6/MM3 (ref 4.14–5.8)
SODIUM SERPL-SCNC: 139 MMOL/L (ref 136–145)
WBC NRBC COR # BLD AUTO: 7.81 10*3/MM3 (ref 3.4–10.8)
WHOLE BLOOD HOLD SPECIMEN: NORMAL

## 2024-08-24 PROCEDURE — 96367 TX/PROPH/DG ADDL SEQ IV INF: CPT

## 2024-08-24 PROCEDURE — 87186 SC STD MICRODIL/AGAR DIL: CPT | Performed by: INTERNAL MEDICINE

## 2024-08-24 PROCEDURE — 87641 MR-STAPH DNA AMP PROBE: CPT | Performed by: INTERNAL MEDICINE

## 2024-08-24 PROCEDURE — 25810000003 SODIUM CHLORIDE 0.9 % SOLUTION 250 ML FLEX CONT: Performed by: INTERNAL MEDICINE

## 2024-08-24 PROCEDURE — 96365 THER/PROPH/DIAG IV INF INIT: CPT

## 2024-08-24 PROCEDURE — 25010000002 VANCOMYCIN 10 G RECONSTITUTED SOLUTION: Performed by: EMERGENCY MEDICINE

## 2024-08-24 PROCEDURE — 87070 CULTURE OTHR SPECIMN AEROBIC: CPT | Performed by: INTERNAL MEDICINE

## 2024-08-24 PROCEDURE — 87205 SMEAR GRAM STAIN: CPT | Performed by: INTERNAL MEDICINE

## 2024-08-24 PROCEDURE — 80053 COMPREHEN METABOLIC PANEL: CPT | Performed by: EMERGENCY MEDICINE

## 2024-08-24 PROCEDURE — 25010000002 HEPARIN (PORCINE) PER 1000 UNITS: Performed by: INTERNAL MEDICINE

## 2024-08-24 PROCEDURE — G0378 HOSPITAL OBSERVATION PER HR: HCPCS

## 2024-08-24 PROCEDURE — 87040 BLOOD CULTURE FOR BACTERIA: CPT | Performed by: EMERGENCY MEDICINE

## 2024-08-24 PROCEDURE — 96372 THER/PROPH/DIAG INJ SC/IM: CPT

## 2024-08-24 PROCEDURE — 83036 HEMOGLOBIN GLYCOSYLATED A1C: CPT | Performed by: INTERNAL MEDICINE

## 2024-08-24 PROCEDURE — 85652 RBC SED RATE AUTOMATED: CPT | Performed by: EMERGENCY MEDICINE

## 2024-08-24 PROCEDURE — 36415 COLL VENOUS BLD VENIPUNCTURE: CPT

## 2024-08-24 PROCEDURE — 82948 REAGENT STRIP/BLOOD GLUCOSE: CPT

## 2024-08-24 PROCEDURE — 96361 HYDRATE IV INFUSION ADD-ON: CPT

## 2024-08-24 PROCEDURE — 73660 X-RAY EXAM OF TOE(S): CPT

## 2024-08-24 PROCEDURE — 63710000001 INSULIN LISPRO (HUMAN) PER 5 UNITS: Performed by: INTERNAL MEDICINE

## 2024-08-24 PROCEDURE — 25010000002 PIPERACILLIN SOD-TAZOBACTAM PER 1 G: Performed by: INTERNAL MEDICINE

## 2024-08-24 PROCEDURE — 99285 EMERGENCY DEPT VISIT HI MDM: CPT

## 2024-08-24 PROCEDURE — 25010000002 VANCOMYCIN HCL 1.25 G RECONSTITUTED SOLUTION 1 EACH VIAL: Performed by: INTERNAL MEDICINE

## 2024-08-24 PROCEDURE — 25010000002 PIPERACILLIN SOD-TAZOBACTAM PER 1 G: Performed by: EMERGENCY MEDICINE

## 2024-08-24 PROCEDURE — 85025 COMPLETE CBC W/AUTO DIFF WBC: CPT | Performed by: EMERGENCY MEDICINE

## 2024-08-24 PROCEDURE — 83605 ASSAY OF LACTIC ACID: CPT | Performed by: EMERGENCY MEDICINE

## 2024-08-24 PROCEDURE — 87077 CULTURE AEROBIC IDENTIFY: CPT | Performed by: INTERNAL MEDICINE

## 2024-08-24 PROCEDURE — 25810000003 SODIUM CHLORIDE 0.9 % SOLUTION: Performed by: EMERGENCY MEDICINE

## 2024-08-24 PROCEDURE — 25810000003 SODIUM CHLORIDE 0.9 % SOLUTION: Performed by: INTERNAL MEDICINE

## 2024-08-24 RX ORDER — HEPARIN SODIUM 5000 [USP'U]/ML
5000 INJECTION, SOLUTION INTRAVENOUS; SUBCUTANEOUS EVERY 8 HOURS SCHEDULED
Status: DISCONTINUED | OUTPATIENT
Start: 2024-08-24 | End: 2024-08-27 | Stop reason: HOSPADM

## 2024-08-24 RX ORDER — NICOTINE POLACRILEX 4 MG
15 LOZENGE BUCCAL
Status: DISCONTINUED | OUTPATIENT
Start: 2024-08-24 | End: 2024-08-27 | Stop reason: HOSPADM

## 2024-08-24 RX ORDER — AMOXICILLIN 250 MG
2 CAPSULE ORAL 2 TIMES DAILY
Status: DISCONTINUED | OUTPATIENT
Start: 2024-08-24 | End: 2024-08-27 | Stop reason: HOSPADM

## 2024-08-24 RX ORDER — ONDANSETRON 2 MG/ML
4 INJECTION INTRAMUSCULAR; INTRAVENOUS EVERY 6 HOURS PRN
Status: DISCONTINUED | OUTPATIENT
Start: 2024-08-24 | End: 2024-08-27 | Stop reason: HOSPADM

## 2024-08-24 RX ORDER — SODIUM CHLORIDE 0.9 % (FLUSH) 0.9 %
10 SYRINGE (ML) INJECTION EVERY 12 HOURS SCHEDULED
Status: DISCONTINUED | OUTPATIENT
Start: 2024-08-24 | End: 2024-08-27 | Stop reason: HOSPADM

## 2024-08-24 RX ORDER — FAMOTIDINE 20 MG/1
40 TABLET, FILM COATED ORAL DAILY
Status: DISCONTINUED | OUTPATIENT
Start: 2024-08-24 | End: 2024-08-27 | Stop reason: HOSPADM

## 2024-08-24 RX ORDER — SODIUM CHLORIDE 9 MG/ML
40 INJECTION, SOLUTION INTRAVENOUS AS NEEDED
Status: DISCONTINUED | OUTPATIENT
Start: 2024-08-24 | End: 2024-08-27 | Stop reason: HOSPADM

## 2024-08-24 RX ORDER — GLIPIZIDE 5 MG/1
5 TABLET ORAL
COMMUNITY
Start: 2024-06-07 | End: 2024-09-05

## 2024-08-24 RX ORDER — POLYETHYLENE GLYCOL 3350 17 G/17G
17 POWDER, FOR SOLUTION ORAL DAILY PRN
Status: DISCONTINUED | OUTPATIENT
Start: 2024-08-24 | End: 2024-08-27 | Stop reason: HOSPADM

## 2024-08-24 RX ORDER — BISACODYL 5 MG/1
5 TABLET, DELAYED RELEASE ORAL DAILY PRN
Status: DISCONTINUED | OUTPATIENT
Start: 2024-08-24 | End: 2024-08-27 | Stop reason: HOSPADM

## 2024-08-24 RX ORDER — IBUPROFEN 600 MG/1
1 TABLET ORAL
Status: DISCONTINUED | OUTPATIENT
Start: 2024-08-24 | End: 2024-08-27 | Stop reason: HOSPADM

## 2024-08-24 RX ORDER — NITROGLYCERIN 0.4 MG/1
0.4 TABLET SUBLINGUAL
Status: DISCONTINUED | OUTPATIENT
Start: 2024-08-24 | End: 2024-08-27 | Stop reason: HOSPADM

## 2024-08-24 RX ORDER — DEXTROSE MONOHYDRATE 25 G/50ML
25 INJECTION, SOLUTION INTRAVENOUS
Status: DISCONTINUED | OUTPATIENT
Start: 2024-08-24 | End: 2024-08-27 | Stop reason: HOSPADM

## 2024-08-24 RX ORDER — ONDANSETRON 4 MG/1
4 TABLET, ORALLY DISINTEGRATING ORAL EVERY 6 HOURS PRN
Status: DISCONTINUED | OUTPATIENT
Start: 2024-08-24 | End: 2024-08-27 | Stop reason: HOSPADM

## 2024-08-24 RX ORDER — ACETAMINOPHEN 650 MG/1
650 SUPPOSITORY RECTAL EVERY 4 HOURS PRN
Status: DISCONTINUED | OUTPATIENT
Start: 2024-08-24 | End: 2024-08-27 | Stop reason: HOSPADM

## 2024-08-24 RX ORDER — ACETAMINOPHEN 325 MG/1
650 TABLET ORAL EVERY 4 HOURS PRN
Status: DISCONTINUED | OUTPATIENT
Start: 2024-08-24 | End: 2024-08-27 | Stop reason: HOSPADM

## 2024-08-24 RX ORDER — SODIUM CHLORIDE 0.9 % (FLUSH) 0.9 %
10 SYRINGE (ML) INJECTION AS NEEDED
Status: DISCONTINUED | OUTPATIENT
Start: 2024-08-24 | End: 2024-08-27 | Stop reason: HOSPADM

## 2024-08-24 RX ORDER — INSULIN LISPRO 100 [IU]/ML
2-7 INJECTION, SOLUTION INTRAVENOUS; SUBCUTANEOUS
Status: DISCONTINUED | OUTPATIENT
Start: 2024-08-24 | End: 2024-08-27 | Stop reason: HOSPADM

## 2024-08-24 RX ORDER — SODIUM CHLORIDE 9 MG/ML
50 INJECTION, SOLUTION INTRAVENOUS CONTINUOUS
Status: DISCONTINUED | OUTPATIENT
Start: 2024-08-24 | End: 2024-08-27 | Stop reason: HOSPADM

## 2024-08-24 RX ORDER — OXYCODONE AND ACETAMINOPHEN 5; 325 MG/1; MG/1
1 TABLET ORAL EVERY 8 HOURS PRN
Status: DISCONTINUED | OUTPATIENT
Start: 2024-08-24 | End: 2024-08-27 | Stop reason: HOSPADM

## 2024-08-24 RX ORDER — BISACODYL 10 MG
10 SUPPOSITORY, RECTAL RECTAL DAILY PRN
Status: DISCONTINUED | OUTPATIENT
Start: 2024-08-24 | End: 2024-08-27 | Stop reason: HOSPADM

## 2024-08-24 RX ORDER — ACETAMINOPHEN 160 MG/5ML
650 SOLUTION ORAL EVERY 4 HOURS PRN
Status: DISCONTINUED | OUTPATIENT
Start: 2024-08-24 | End: 2024-08-27 | Stop reason: HOSPADM

## 2024-08-24 RX ORDER — CEPHALEXIN 500 MG/1
500 CAPSULE ORAL
COMMUNITY
Start: 2024-08-22 | End: 2024-08-27 | Stop reason: HOSPADM

## 2024-08-24 RX ADMIN — SODIUM CHLORIDE 100 ML/HR: 9 INJECTION, SOLUTION INTRAVENOUS at 18:01

## 2024-08-24 RX ADMIN — VANCOMYCIN HYDROCHLORIDE 2250 MG: 10 INJECTION, POWDER, LYOPHILIZED, FOR SOLUTION INTRAVENOUS at 12:31

## 2024-08-24 RX ADMIN — PIPERACILLIN AND TAZOBACTAM 3.38 G: 3; .375 INJECTION, POWDER, FOR SOLUTION INTRAVENOUS at 17:05

## 2024-08-24 RX ADMIN — VANCOMYCIN HYDROCHLORIDE 1250 MG: 1.25 INJECTION, POWDER, LYOPHILIZED, FOR SOLUTION INTRAVENOUS at 23:00

## 2024-08-24 RX ADMIN — Medication 10 ML: at 21:03

## 2024-08-24 RX ADMIN — INSULIN LISPRO 2 UNITS: 100 INJECTION, SOLUTION INTRAVENOUS; SUBCUTANEOUS at 17:05

## 2024-08-24 RX ADMIN — PIPERACILLIN AND TAZOBACTAM 3.38 G: 3; .375 INJECTION, POWDER, FOR SOLUTION INTRAVENOUS at 11:02

## 2024-08-24 RX ADMIN — FAMOTIDINE 40 MG: 20 TABLET, FILM COATED ORAL at 17:06

## 2024-08-24 RX ADMIN — HEPARIN SODIUM 5000 UNITS: 5000 INJECTION INTRAVENOUS; SUBCUTANEOUS at 21:03

## 2024-08-24 NOTE — NURSING NOTE
Awaiting wife to complete patient's medication list.  He is unsure of what medications he currently takes.

## 2024-08-24 NOTE — PROGRESS NOTES
"[facility] Clinical Pharmacy Services: Zosyn and Vancomycin Pharmacokinetic Initial Consult Note    Marbin Hawkins is a 59 y.o. male who is on day 1 of pharmacy to dose vancomycin.    Indication: Skin and Soft Tissue infection  Consulting Provider: Dr. Christianson  Planned Duration of Therapy: 5d  Loading Dose Ordered or Given: 2250 mg on 8/24 at 1231  MRSA PCR performed: ordered  Culture/Source: pending  Target: -600 mg/L.hr   Other Antimicrobials: Zosyn    Vitals/Labs  Ht: 203.2 cm (80\"); Wt: 118 kg (260 lb)  Temp Readings from Last 1 Encounters:   08/24/24 97.3 °F (36.3 °C) (Oral)    Estimated Creatinine Clearance: 196 mL/min (A) (by C-G formula based on SCr of 0.61 mg/dL (L)).     Results from last 7 days   Lab Units 08/24/24  0746   CREATININE mg/dL 0.61*   WBC 10*3/mm3 7.81     Assessment/Plan:    Vancomycin Dose:   ordered maintenance dose 1250 mg IV every  12  hours  Predictive AUC level for the dose ordered is 460 mg/L.hr, which is within the target of 400-600 mg/L.hr  Vanc Trough has been ordered for 8/26 at 1100 to evaluate  Zosyn dosed at 3.375gm IV q8h per extended infusion protocol    Pharmacy will follow patient's kidney function and will adjust doses and obtain levels as necessary. Thank you for involving pharmacy in this patient's care. Please contact pharmacy with any questions or concerns.                           Corie Diana, PharmD  Clinical Pharmacist    "

## 2024-08-24 NOTE — ED NOTES
Nursing report ED to floor  Marbin Hawkins  59 y.o.  male    HPI :  HPI (Adult)  Stated Reason for Visit: pt states he is a  and noticied a wound on his big right toe 4-5 months ago. Pt had a tetanus shot at urgent care a few months ago for this. Pt states his wound his worsening and was told by PCP to come in  History Obtained From: patient    Chief Complaint  Chief Complaint   Patient presents with    Wound Check       Admitting doctor:   Ronald Christianson MD    Admitting diagnosis:   The encounter diagnosis was Diabetic ulcer of toe of right foot associated with diabetes mellitus due to underlying condition, unspecified ulcer stage.    Code status:   Current Code Status       Date Active Code Status Order ID Comments User Context       Not on file            Allergies:   Patient has no known allergies.    Isolation:   No active isolations    Intake and Output    Intake/Output Summary (Last 24 hours) at 8/24/2024 1147  Last data filed at 8/24/2024 1147  Gross per 24 hour   Intake 100 ml   Output --   Net 100 ml       Weight:       08/24/24  0650   Weight: 118 kg (260 lb)       Most recent vitals:   Vitals:    08/24/24 0903 08/24/24 1001 08/24/24 1059 08/24/24 1101   BP: 126/82   124/82   BP Location:       Patient Position:       Pulse: 84 70 66 67   Resp:       Temp:       TempSrc:       SpO2: 98% 98% 100% 100%   Weight:       Height:           Active LDAs/IV Access:   Lines, Drains & Airways       Active LDAs       Name Placement date Placement time Site Days    Peripheral IV 08/24/24 0744 Left Antecubital 08/24/24  0744  Antecubital  less than 1                    Labs (abnormal labs have a star):   Labs Reviewed   COMPREHENSIVE METABOLIC PANEL - Abnormal; Notable for the following components:       Result Value    Creatinine 0.61 (*)     All other components within normal limits    Narrative:     GFR Normal >60  Chronic Kidney Disease <60  Kidney Failure <15     CBC WITH AUTO DIFFERENTIAL - Abnormal;  Notable for the following components:    RDW 12.2 (*)     All other components within normal limits   SEDIMENTATION RATE - Abnormal; Notable for the following components:    Sed Rate 28 (*)     All other components within normal limits   LACTIC ACID, PLASMA - Normal   BLOOD CULTURE   BLOOD CULTURE   RAINBOW DRAW    Narrative:     The following orders were created for panel order [place] Draw.  Procedure                               Abnormality         Status                     ---------                               -----------         ------                     Green Top (Gel)[[id number]]                                  Final result               Lavender Top[[id number]]                                     Final result               Gold Top - SST[[id number]]                                                              Light Blue Top[[id number]]                                                                Please view results for these tests on the individual orders.   CBC AND DIFFERENTIAL    Narrative:     The following orders were created for panel order CBC & Differential.  Procedure                               Abnormality         Status                     ---------                               -----------         ------                     CBC Auto Differential[[id number]]        Abnormal            Final result                 Please view results for these tests on the individual orders.   GREEN TOP   LAVENDER TOP   GOLD TOP - SST   LIGHT BLUE TOP       EKG:   No orders to display       Meds given in ED:   Medications   vancomycin 2250 mg/500 mL 0.9% NS IVPB ([facility]) (has no administration in time range)   piperacillin-tazobactam (ZOSYN) 3.375 g IVPB in 100 mL NS MBP (CD) (0 g Intravenous Stopped 8/24/24 1147)       Imaging results:  XR Toe 2+ View Right    Result Date: 8/24/2024   As described.    This report was finalized on 8/24/2024 9:14 AM by Dr. Aram Hall M.D on Workstation: "[email or web address]"        Ambulatory status:   - assist    Social issues:   Social History     Socioeconomic History    Marital status:    Tobacco Use    Smoking status: Every Day     Current packs/day: 3.00     Average packs/day: 3.0 packs/day for 35.0 years (105.0 ttl pk-yrs)     Types: Cigarettes    Smokeless tobacco: Never   Substance and Sexual Activity    Alcohol use: Never    Drug use: Never       Peripheral Neurovascular  Peripheral Neurovascular (Adult)  Peripheral Neurovascular WDL: .WDL except, neurovascular assessment lower  RLE Neurovascular Assessment  Sensation RLE: sensation absent, other (see comments) (pt has no sensation to R lower foot)    Neuro Cognitive  Neuro Cognitive (Adult)  Cognitive/Neuro/Behavioral WDL: WDL    Learning  Learning Assessment (Adult)  Learning Readiness and Ability: no barriers identified    Respiratory  Respiratory WDL  Respiratory WDL: WDL    Abdominal Pain       Pain Assessments  Pain (Adult)  (0-10) Pain Rating: Rest: 3    NIH Stroke Scale       Afsaneh Aguilar RN  08/24/24 11:47 EDT

## 2024-08-24 NOTE — H&P
Patient Name:  Marbin Hawkins  YOB: 1965  MRN:  [id number]  Admit Date:  8/24/2024  Patient Care Team:  Joyce Restrepo APRN as PCP - General (Nurse Practitioner)        Chief complaint.  Wound to right great toe.  History Present Illness     A pleasant 59 years old gentleman with a past history of type 2 diabetes/hyperlipidemia.  Patient problem started approximately 1 year ago when he stepped on a nail injuring his right great toe and was treated with p.o. antibiotics he subsequently developed a callus at the plantar aspect of the right great toe.  3 weeks ago he noticed that the skin has opened at the center of the calus at this was accompanied by swelling and redness.  There was no pain or discharge.  Also 3 weeks ago he noticed discoloration of the third left toe.  There was positive chills.  No fever.  No night sweats.  There was no recent trauma of the foot but he states that his left foot was sliding against his shoes over the last several weeks.  In the emergency department ESR was elevated at 28.  CBC was normal.  CMP was normal.  Lactic acid was normal.  Blood cultures obtained.  X-ray of the right foot revealed ulcerations and soft tissue swelling of the right great toe otherwise negative.  He was given IV Zosyn in the emergency department subsequently admitted.        Review of Systems   Vascular/respiratory.  No chest pain/no shortness of breath/no palpitations/no cough/no wheeze/no hemoptysis/no ankle edema.  GI.  No abdominal pain.  No nausea or vomiting.  Normal bowel habits without constipation/diarrhea/bleeding per rectum/melena  .  No dysuria or hematuria.  CNS.  No headache.  No loss of consciousness.  No focal neurological symptoms.  Personal History     History reviewed. No pertinent past medical history.  Past Surgical History:   Procedure Laterality Date    APPENDECTOMY      CARPAL TUNNEL RELEASE Left      Family History   Problem Relation Age of Onset    Alzheimer's  disease Mother     Heart attack Father     Uterine cancer Sister     Lung cancer Brother     No Known Problems Brother     No Known Problems Brother      Social History     Tobacco Use    Smoking status: Every Day     Current packs/day: 3.00     Average packs/day: 3.0 packs/day for 35.0 years (105.0 ttl pk-yrs)     Types: Cigarettes    Smokeless tobacco: Never   Vaping Use    Vaping status: Never Used   Substance Use Topics    Alcohol use: Never    Drug use: Never     No current facility-administered medications on file prior to encounter.     Current Outpatient Medications on File Prior to Encounter   Medication Sig Dispense Refill    cephalexin (KEFLEX) 500 MG capsule Take 1 capsule by mouth.      glipizide (GLUCOTROL) 5 MG tablet Take 1 tablet by mouth 2 (Two) Times a Day Before Meals.      metFORMIN (GLUCOPHAGE) 500 MG tablet Take 1 tablet by mouth 2 (Two) Times a Day With Meals.      naproxen (Naprosyn) 500 MG tablet Take 1 tablet by mouth 2 (Two) Times a Day With Meals. 60 tablet 3    SITagliptin (Januvia) 100 MG tablet Take 1 tablet by mouth Daily. 30 tablet 3    testosterone cypionate (DEPO-TESTOSTERONE) 100 MG/ML solution injection Inject 1 mL into the appropriate muscle as directed by prescriber.      [DISCONTINUED] atorvastatin (Lipitor) 10 MG tablet Take 1 tablet by mouth Every Night. 30 tablet 6     No Known Allergies    Objective    Objective     Vital Signs  Temp:  [97.3 °F (36.3 °C)] 97.3 °F (36.3 °C)  Heart Rate:  [66-97] 68  Resp:  [16-18] 18  BP: (115-126)/(73-82) 122/81  SpO2:  [95 %-100 %] 100 %  on   ;   Device (Oxygen Therapy): room air  Body mass index is 28.56 kg/m².    Physical Exam  General.  Delay gentleman.  Alert and oriented x 4.  No apparent pain/distress/diaphoresis.  Normal mood and affect.  He is obese.  Eyes.  Pupils equal round and reactive.  Intact extraocular musculature.  No pallor or jaundice.  Oral cavity.  Moist mucous membrane.   neck.  Supple.  No JVD.  No lymphadenopathy  or thyromegaly.  Cardiovascular.  Regular rate and rhythm with no gallops or murmurs.  Chest.  Clear to auscultation bilaterally with no added sounds.  Abdomen.  Soft lax.  No tenderness.  No organomegaly.  No guarding or rebound.  Extremities.  No clubbing/cyanosis/edema.  Evidence of lower extremity venous stasis dermatitis bilaterally. Callus at the plantar aspect of the right great toe measuring approximately 3 x 3 cm with a central 0.25 x 0.25 circular wound at the center of the callus without any discharge.  Plus minus fluctuation of the right great toe associated with swelling.  No lower extremity streaks.  No inguinal lymphadenopathy.  Left third toe with a 0.25 x 1.15 cm blackish discoloration at the tip that appears to be superficial.  Normal lower extremity feet temperature.  Left dorsalis pedis and posterior tibial pulse was not felt.  Right dorsalis pedis pulse is not felt but the right posterior tibial pulse is felt.  CNS.  No acute focal neurological deficits.    Results Review:  I reviewed the patient's new clinical results.  I reviewed the patient's new imaging results and agree with the interpretation.  I reviewed the patient's other test results and agree with the interpretation  I personally viewed and interpreted the patient's EKG/Telemetry data  Discussed with ED provider.    Lab Results (last 24 hours)       Procedure Component Value Units Date/Time    CBC & Differential [[id number]]  (Abnormal) Collected: 08/24/24 0746    Specimen: Blood Updated: 08/24/24 0756    Narrative:      The following orders were created for panel order CBC & Differential.  Procedure                               Abnormality         Status                     ---------                               -----------         ------                     CBC Auto Differential[[id number]]        Abnormal            Final result                 Please view results for these tests on the individual orders.    Comprehensive Metabolic  Panel [[id number]]  (Abnormal) Collected: 08/24/24 0746    Specimen: Blood Updated: 08/24/24 0816     Glucose 99 mg/dL      BUN 13 mg/dL      Creatinine 0.61 mg/dL      Sodium 139 mmol/L      Potassium 4.1 mmol/L      Chloride 104 mmol/L      CO2 25.0 mmol/L      Calcium 9.1 mg/dL      Total Protein 7.0 g/dL      Albumin 4.2 g/dL      ALT (SGPT) 16 U/L      AST (SGOT) 14 U/L      Alkaline Phosphatase 79 U/L      Total Bilirubin 0.4 mg/dL      Globulin 2.8 gm/dL      A/G Ratio 1.5 g/dL      BUN/Creatinine Ratio 21.3     Anion Gap 10.0 mmol/L      eGFR 111.3 mL/min/1.73     Narrative:      GFR Normal >60  Chronic Kidney Disease <60  Kidney Failure <15      CBC Auto Differential [[id number]]  (Abnormal) Collected: 08/24/24 0746    Specimen: Blood Updated: 08/24/24 0756     WBC 7.81 10*3/mm3      RBC 4.60 10*6/mm3      Hemoglobin 14.1 g/dL      Hematocrit 42.1 %      MCV 91.5 fL      MCH 30.7 pg      MCHC 33.5 g/dL      RDW 12.2 %      RDW-SD 41.0 fl      MPV 9.0 fL      Platelets 216 10*3/mm3      Neutrophil % 50.9 %      Lymphocyte % 33.9 %      Monocyte % 9.9 %      Eosinophil % 4.4 %      Basophil % 0.4 %      Immature Grans % 0.5 %      Neutrophils, Absolute 3.98 10*3/mm3      Lymphocytes, Absolute 2.65 10*3/mm3      Monocytes, Absolute 0.77 10*3/mm3      Eosinophils, Absolute 0.34 10*3/mm3      Basophils, Absolute 0.03 10*3/mm3      Immature Grans, Absolute 0.04 10*3/mm3      nRBC 0.0 /100 WBC     Sedimentation Rate [[id number]]  (Abnormal) Collected: 08/24/24 0746    Specimen: Blood Updated: 08/24/24 1100     Sed Rate 28 mm/hr     Hemoglobin A1c [[id number]] Collected: 08/24/24 0746    Specimen: Blood Updated: 08/24/24 1623    Blood Culture - Blood, Arm, Right [[id number]] Collected: 08/24/24 0942    Specimen: Blood from Arm, Right Updated: 08/24/24 0947    Blood Culture - Blood, Arm, Right [[id number]] Collected: 08/24/24 0942    Specimen: Blood from Arm, Right Updated: 08/24/24 0947    Lactic Acid, Plasma [[id number]]   (Normal) Collected: 08/24/24 1100    Specimen: Blood Updated: 08/24/24 1130     Lactate 1.1 mmol/L     POC Glucose Once [[id number]]  (Abnormal) Collected: 08/24/24 1622    Specimen: Blood Updated: 08/24/24 1625     Glucose 168 mg/dL             Imaging Results (Last 24 Hours)       Procedure Component Value Units Date/Time    XR Toe 2+ View Right [[id number]] Collected: 08/24/24 0913     Updated: 08/24/24 0917    Narrative:      XR TOE 2+ VW RIGHT-     INDICATIONS: Wound for 2 weeks.     TECHNIQUE: 3 VIEWS OF THE RIGHT GREAT TOE     COMPARISON: None available     FINDINGS:     Soft tissue swelling and ulceration are apparent at the plantar aspect  of the great toe, suggesting soft tissue infection, correlate  clinically. No acute fracture, erosion, or dislocation is identified. If  there is clinical suspicion for radiographically occult osteomyelitis,  MRI or bone scan can be obtained.       Impression:         As described.           This report was finalized on 8/24/2024 9:14 AM by Dr. Aram Hall M.D on Workstation: [email or web address]                   No orders to display            Assessment/Plan     Active Hospital Problems    Diagnosis  POA    **Diabetic foot ulcer [E11.621, L97.509]  Yes    Hyperlipidemia [E78.5]  Yes    Hematoma of left third toe [S90.122A]  Yes    Tobacco use [Z72.0]  Yes    Type 2 diabetes mellitus [E11.9]  Yes      Resolved Hospital Problems   No resolved problems to display.           Right great toe diabetic ulcer with infection.  X-ray with no evidence of osteomyelitis.  Will check MRI of the right foot.  Will check bilateral lower extremity arterial ultrasound.  Will initiate IV vancomycin and IV Zosyn.  Will check MRSA.  Will check blood cultures.  Consult vascular surgery.  Will check wound culture.  Left third toe hematoma.  This appears to be a fracture and hematoma rather than a gangrene.  Will see what vascular surgery thinks.  Type 2 diabetes.  Will stop Glucotrol and  metformin.  Placed on sliding scale insulin and check A1c.  Dyslipidemia.  Will check fasting lipids.  Not currently on any treatment  Tobacco abuse.  Declined nicotine patch.  Counseled against smoking  VTE prophylaxis.  Subcu heparin.      Discussed my findings and plan of treatment with the patient/wife/nurse/ER provider.  Disposition.  To be determined based on clinical course.    Code Status -full code.       Ronald Christianson MD  [facility]  08/24/24  16:31 EDT

## 2024-08-24 NOTE — PLAN OF CARE
Goal Outcome Evaluation:  Plan of Care Reviewed With: patient        Progress: no change  Outcome Evaluation: VSS, RA. IVF at 100 ml/hr. MRI/contrast faxed.  Doppler in morning.

## 2024-08-24 NOTE — ED PROVIDER NOTES
EMERGENCY DEPARTMENT ENCOUNTER    Room Number:  S517/1  PCP: Joyce Restrepo APRN  Historian: Patient      HPI:  Chief Complaint: Right foot wound  A complete HPI/ROS/PMH/PSH/SH/FH are unobtainable due to: None   Context: Marbin Hawkins is a 59 y.o. male who presents to the ED c/o right great toe wound.  Patient states symptoms started several weeks ago.  Patient cut his toe.  Has had a wound that has not been healing.  Has been gradually getting worse.  Patient was seen by primary provider and sent in for evaluation.  Patient has had increasing drainage from the toe.  Has no sensation to the foot.  Patient is diabetic.            PAST MEDICAL HISTORY  Active Ambulatory Problems     Diagnosis Date Noted    Screen for colon cancer 09/26/2019    Hyperlipidemia 02/03/2021    Type 2 diabetes mellitus without complication, without long-term current use of insulin 12/13/2021     Resolved Ambulatory Problems     Diagnosis Date Noted    No Resolved Ambulatory Problems     No Additional Past Medical History         PAST SURGICAL HISTORY  Past Surgical History:   Procedure Laterality Date    APPENDECTOMY      CARPAL TUNNEL RELEASE Left          FAMILY HISTORY  Family History   Problem Relation Age of Onset    Alzheimer's disease Mother     Heart attack Father     Uterine cancer Sister     Lung cancer Brother     No Known Problems Brother     No Known Problems Brother          SOCIAL HISTORY  Social History     Socioeconomic History    Marital status:    Tobacco Use    Smoking status: Every Day     Current packs/day: 3.00     Average packs/day: 3.0 packs/day for 35.0 years (105.0 ttl pk-yrs)     Types: Cigarettes    Smokeless tobacco: Never   Substance and Sexual Activity    Alcohol use: Never    Drug use: Never         ALLERGIES  Patient has no known allergies.        REVIEW OF SYSTEMS  Review of Systems   Right great toe wound      PHYSICAL EXAM  ED Triage Vitals   Temp Heart Rate Resp BP SpO2   08/24/24 0650 08/24/24  0650 08/24/24 0650 08/24/24 0653 08/24/24 0650   97.3 °F (36.3 °C) 97 16 122/75 98 %      Temp src Heart Rate Source Patient Position BP Location FiO2 (%)   08/24/24 0650 08/24/24 0650 08/24/24 0653 08/24/24 0653 --   Tympanic Monitor Sitting Right arm        Physical Exam      GENERAL: no acute distress  HENT: nares patent  EYES: no scleral icterus  CV: regular rhythm, normal rate  RESPIRATORY: normal effort  ABDOMEN: soft  MUSCULOSKELETAL: no deformity.  Patient with large ulcer to his right toe with redness and swelling.  Foul-smelling discharge  NEURO: alert, moves all extremities, follows commands  PSYCH:  calm, cooperative  SKIN: warm, dry    Vital signs and nursing notes reviewed.          LAB RESULTS  Recent Results (from the past 24 hour(s))   Comprehensive Metabolic Panel    Collection Time: 08/24/24  7:46 AM    Specimen: Blood   Result Value Ref Range    Glucose 99 65 - 99 mg/dL    BUN 13 6 - 20 mg/dL    Creatinine 0.61 (L) 0.76 - 1.27 mg/dL    Sodium 139 136 - 145 mmol/L    Potassium 4.1 3.5 - 5.2 mmol/L    Chloride 104 98 - 107 mmol/L    CO2 25.0 22.0 - 29.0 mmol/L    Calcium 9.1 8.6 - 10.5 mg/dL    Total Protein 7.0 6.0 - 8.5 g/dL    Albumin 4.2 3.5 - 5.2 g/dL    ALT (SGPT) 16 1 - 41 U/L    AST (SGOT) 14 1 - 40 U/L    Alkaline Phosphatase 79 39 - 117 U/L    Total Bilirubin 0.4 0.0 - 1.2 mg/dL    Globulin 2.8 gm/dL    A/G Ratio 1.5 g/dL    BUN/Creatinine Ratio 21.3 7.0 - 25.0    Anion Gap 10.0 5.0 - 15.0 mmol/L    eGFR 111.3 >60.0 mL/min/1.73   Green Top (Gel)    Collection Time: 08/24/24  7:46 AM   Result Value Ref Range    Extra Tube Hold for add-ons.    Lavender Top    Collection Time: 08/24/24  7:46 AM   Result Value Ref Range    Extra Tube hold for add-on    CBC Auto Differential    Collection Time: 08/24/24  7:46 AM    Specimen: Blood   Result Value Ref Range    WBC 7.81 3.40 - 10.80 10*3/mm3    RBC 4.60 4.14 - 5.80 10*6/mm3    Hemoglobin 14.1 13.0 - 17.7 g/dL    Hematocrit 42.1 37.5 - 51.0 %     MCV 91.5 79.0 - 97.0 fL    MCH 30.7 26.6 - 33.0 pg    MCHC 33.5 31.5 - 35.7 g/dL    RDW 12.2 (L) 12.3 - 15.4 %    RDW-SD 41.0 37.0 - 54.0 fl    MPV 9.0 6.0 - 12.0 fL    Platelets 216 140 - 450 10*3/mm3    Neutrophil % 50.9 42.7 - 76.0 %    Lymphocyte % 33.9 19.6 - 45.3 %    Monocyte % 9.9 5.0 - 12.0 %    Eosinophil % 4.4 0.3 - 6.2 %    Basophil % 0.4 0.0 - 1.5 %    Immature Grans % 0.5 0.0 - 0.5 %    Neutrophils, Absolute 3.98 1.70 - 7.00 10*3/mm3    Lymphocytes, Absolute 2.65 0.70 - 3.10 10*3/mm3    Monocytes, Absolute 0.77 0.10 - 0.90 10*3/mm3    Eosinophils, Absolute 0.34 0.00 - 0.40 10*3/mm3    Basophils, Absolute 0.03 0.00 - 0.20 10*3/mm3    Immature Grans, Absolute 0.04 0.00 - 0.05 10*3/mm3    nRBC 0.0 0.0 - 0.2 /100 WBC   Sedimentation Rate    Collection Time: 08/24/24  7:46 AM    Specimen: Blood   Result Value Ref Range    Sed Rate 28 (H) 0 - 20 mm/hr   Lactic Acid, Plasma    Collection Time: 08/24/24 11:00 AM    Specimen: Blood   Result Value Ref Range    Lactate 1.1 0.5 - 2.0 mmol/L       Ordered the above labs and reviewed the results.        RADIOLOGY  XR Toe 2+ View Right    Result Date: 8/24/2024  XR TOE 2+ VW RIGHT-  INDICATIONS: Wound for 2 weeks.  TECHNIQUE: 3 VIEWS OF THE RIGHT GREAT TOE  COMPARISON: None available  FINDINGS:  Soft tissue swelling and ulceration are apparent at the plantar aspect of the great toe, suggesting soft tissue infection, correlate clinically. No acute fracture, erosion, or dislocation is identified. If there is clinical suspicion for radiographically occult osteomyelitis, MRI or bone scan can be obtained.       As described.    This report was finalized on 8/24/2024 9:14 AM by Dr. Aram Hall M.D on Workstation: [email or web address]       Ordered the above noted radiological studies.  X-ray of toe independent interpreted by me and shows no obvious fracture          PROCEDURES  Procedures          MEDICATIONS GIVEN IN ER  Medications   vancomycin 2250 mg/500 mL 0.9% NS IVPB  ([facility]) (2,250 mg Intravenous New Bag 8/24/24 1231)   piperacillin-tazobactam (ZOSYN) 3.375 g IVPB in 100 mL NS MBP (CD) (0 g Intravenous Stopped 8/24/24 1147)                   MEDICAL DECISION MAKING, PROGRESS, and CONSULTS    All labs have been independently reviewed by me.  All radiology studies have been reviewed by me and I have also reviewed the radiology report.   EKG's independently viewed and interpreted by me.  Discussion below represents my analysis of pertinent findings related to patient's condition, differential diagnosis, treatment plan and final disposition.      Additional sources:  - Discussed/ obtained information from independent historians: None    - External (non-ED) record review: Epic reviewed patient seen by her primary provider 8/22/2024 for diabetic foot infection    - Chronic or social conditions impacting care: None    - Shared decision making: Discussed options with patient including inpatient versus outpatient.  Patient will stay for inpatient antibiotics and further imaging      Orders placed during this visit:  Orders Placed This Encounter   Procedures    Blood Culture - Blood,    Blood Culture - Blood,    XR Toe 2+ View Right    [place] Draw    Comprehensive Metabolic Panel    CBC Auto Differential    Sedimentation Rate    Lactic Acid, Plasma    [facility] (on-call MD unless specified) Details    Initiate Observation Status    CBC & Differential    Green Top (Gel)    Lavender Top    Gold Top - SST    Light Blue Top         Additional orders considered but not ordered:  None        Differential diagnosis includes but is not limited to:    Diabetic ulcer versus osteomyelitis      Independent interpretation of labs, radiology studies, and discussions with consultants:  ED Course as of 08/24/24 1302   Sat Aug 24, 2024   1058 10:59 EDT  Patient with infected diabetic foot ulcer.  Patient has been on Keflex.  Patient has wound on the foot with purulence and foul smell.  X-ray does not show bony  involvement at this point.  Discussed options with the patient he will stay in the hospital for antibiotics.  Discussed with Dr. Christianson who will admit. [SL]      ED Course User Index  [SL] Tor Avalos MD                 DIAGNOSIS  Final diagnoses:   Diabetic ulcer of toe of right foot associated with diabetes mellitus due to underlying condition, unspecified ulcer stage         DISPOSITION  admit            Latest Documented Vital Signs:  As of 13:02 EDT  BP- 124/82 HR- 79 Temp- 97.3 °F (36.3 °C) (Tympanic) O2 sat- 100%              --    Please note that portions of this were completed with a voice recognition program.       Note Disclaimer: At [facility], we believe that sharing information builds trust and better relationships. You are receiving this note because you are receiving care at [facility] or recently visited. It is possible you will see health information before a provider has talked with you about it. This kind of information can be easy to misunderstand. To help you fully understand what it means for your health, we urge you to discuss this note with your provider.            Tor Avalos MD  08/24/24 6717

## 2024-08-25 ENCOUNTER — APPOINTMENT (OUTPATIENT)
Dept: MRI IMAGING | Facility: HOSPITAL | Age: 59
End: 2024-08-25
Payer: MEDICAID

## 2024-08-25 ENCOUNTER — APPOINTMENT (OUTPATIENT)
Dept: CARDIOLOGY | Facility: HOSPITAL | Age: 59
End: 2024-08-25
Payer: MEDICAID

## 2024-08-25 ENCOUNTER — APPOINTMENT (OUTPATIENT)
Dept: GENERAL RADIOLOGY | Facility: HOSPITAL | Age: 59
End: 2024-08-25
Payer: MEDICAID

## 2024-08-25 PROBLEM — I73.9 ASYMPTOMATIC PVD (PERIPHERAL VASCULAR DISEASE): Status: ACTIVE | Noted: 2024-08-25

## 2024-08-25 LAB
ANION GAP SERPL CALCULATED.3IONS-SCNC: 7 MMOL/L (ref 5–15)
BASOPHILS # BLD AUTO: 0.03 10*3/MM3 (ref 0–0.2)
BASOPHILS NFR BLD AUTO: 0.4 % (ref 0–1.5)
BH CV LOWER ARTERIAL LEFT ABI RATIO: 0.4
BH CV LOWER ARTERIAL LEFT DORSALIS PEDIS SYS MAX: 50
BH CV LOWER ARTERIAL LEFT GREAT TOE SYS MAX: 33
BH CV LOWER ARTERIAL LEFT LOW THIGH SYS MAX: 75
BH CV LOWER ARTERIAL LEFT POPLITEAL SYS MAX: 76
BH CV LOWER ARTERIAL LEFT POST TIBIAL SYS MAX: 49
BH CV LOWER ARTERIAL LEFT TBI RATIO: 0.26
BH CV LOWER ARTERIAL RIGHT ABI RATIO: 0.98
BH CV LOWER ARTERIAL RIGHT DORSALIS PEDIS SYS MAX: 120
BH CV LOWER ARTERIAL RIGHT GREAT TOE SYS MAX: 108
BH CV LOWER ARTERIAL RIGHT LOW THIGH SYS MAX: 129
BH CV LOWER ARTERIAL RIGHT POPLITEAL SYS MAX: 137
BH CV LOWER ARTERIAL RIGHT POST TIBIAL SYS MAX: 124
BH CV LOWER ARTERIAL RIGHT TBI RATIO: 0.86
BUN SERPL-MCNC: 12 MG/DL (ref 6–20)
BUN/CREAT SERPL: 18.8 (ref 7–25)
CALCIUM SPEC-SCNC: 8.8 MG/DL (ref 8.6–10.5)
CHLORIDE SERPL-SCNC: 108 MMOL/L (ref 98–107)
CHOLEST SERPL-MCNC: 159 MG/DL (ref 0–200)
CO2 SERPL-SCNC: 25 MMOL/L (ref 22–29)
CREAT SERPL-MCNC: 0.64 MG/DL (ref 0.76–1.27)
DEPRECATED RDW RBC AUTO: 40.7 FL (ref 37–54)
EGFRCR SERPLBLD CKD-EPI 2021: 109.7 ML/MIN/1.73
EOSINOPHIL # BLD AUTO: 0.33 10*3/MM3 (ref 0–0.4)
EOSINOPHIL NFR BLD AUTO: 4.7 % (ref 0.3–6.2)
ERYTHROCYTE [DISTWIDTH] IN BLOOD BY AUTOMATED COUNT: 12.2 % (ref 12.3–15.4)
GLUCOSE BLDC GLUCOMTR-MCNC: 124 MG/DL (ref 70–130)
GLUCOSE BLDC GLUCOMTR-MCNC: 137 MG/DL (ref 70–130)
GLUCOSE BLDC GLUCOMTR-MCNC: 147 MG/DL (ref 70–130)
GLUCOSE BLDC GLUCOMTR-MCNC: 157 MG/DL (ref 70–130)
GLUCOSE SERPL-MCNC: 161 MG/DL (ref 65–99)
HCT VFR BLD AUTO: 42 % (ref 37.5–51)
HDLC SERPL-MCNC: 38 MG/DL (ref 40–60)
HGB BLD-MCNC: 13.8 G/DL (ref 13–17.7)
IMM GRANULOCYTES # BLD AUTO: 0.02 10*3/MM3 (ref 0–0.05)
IMM GRANULOCYTES NFR BLD AUTO: 0.3 % (ref 0–0.5)
LDLC SERPL CALC-MCNC: 90 MG/DL (ref 0–100)
LDLC/HDLC SERPL: 2.23 {RATIO}
LYMPHOCYTES # BLD AUTO: 2.59 10*3/MM3 (ref 0.7–3.1)
LYMPHOCYTES NFR BLD AUTO: 36.5 % (ref 19.6–45.3)
MCH RBC QN AUTO: 30.1 PG (ref 26.6–33)
MCHC RBC AUTO-ENTMCNC: 32.9 G/DL (ref 31.5–35.7)
MCV RBC AUTO: 91.7 FL (ref 79–97)
MONOCYTES # BLD AUTO: 0.61 10*3/MM3 (ref 0.1–0.9)
MONOCYTES NFR BLD AUTO: 8.6 % (ref 5–12)
NEUTROPHILS NFR BLD AUTO: 3.51 10*3/MM3 (ref 1.7–7)
NEUTROPHILS NFR BLD AUTO: 49.5 % (ref 42.7–76)
NRBC BLD AUTO-RTO: 0 /100 WBC (ref 0–0.2)
PLATELET # BLD AUTO: 213 10*3/MM3 (ref 140–450)
PMV BLD AUTO: 9.4 FL (ref 6–12)
POTASSIUM SERPL-SCNC: 4.3 MMOL/L (ref 3.5–5.2)
RBC # BLD AUTO: 4.58 10*6/MM3 (ref 4.14–5.8)
SODIUM SERPL-SCNC: 140 MMOL/L (ref 136–145)
TRIGL SERPL-MCNC: 182 MG/DL (ref 0–150)
UPPER ARTERIAL LEFT ARM BRACHIAL SYS MAX: 126
UPPER ARTERIAL RIGHT ARM BRACHIAL SYS MAX: 112
VLDLC SERPL-MCNC: 31 MG/DL (ref 5–40)
WBC NRBC COR # BLD AUTO: 7.09 10*3/MM3 (ref 3.4–10.8)

## 2024-08-25 PROCEDURE — 96372 THER/PROPH/DIAG INJ SC/IM: CPT

## 2024-08-25 PROCEDURE — 93923 UPR/LXTR ART STDY 3+ LVLS: CPT

## 2024-08-25 PROCEDURE — 73660 X-RAY EXAM OF TOE(S): CPT

## 2024-08-25 PROCEDURE — 80048 BASIC METABOLIC PNL TOTAL CA: CPT | Performed by: INTERNAL MEDICINE

## 2024-08-25 PROCEDURE — G0378 HOSPITAL OBSERVATION PER HR: HCPCS

## 2024-08-25 PROCEDURE — 0 GADOBENATE DIMEGLUMINE 529 MG/ML SOLUTION: Performed by: INTERNAL MEDICINE

## 2024-08-25 PROCEDURE — 25810000003 SODIUM CHLORIDE 0.9 % SOLUTION 250 ML FLEX CONT: Performed by: INTERNAL MEDICINE

## 2024-08-25 PROCEDURE — 73720 MRI LWR EXTREMITY W/O&W/DYE: CPT

## 2024-08-25 PROCEDURE — A9577 INJ MULTIHANCE: HCPCS | Performed by: INTERNAL MEDICINE

## 2024-08-25 PROCEDURE — 25010000002 PIPERACILLIN SOD-TAZOBACTAM PER 1 G: Performed by: INTERNAL MEDICINE

## 2024-08-25 PROCEDURE — 96361 HYDRATE IV INFUSION ADD-ON: CPT

## 2024-08-25 PROCEDURE — 85025 COMPLETE CBC W/AUTO DIFF WBC: CPT | Performed by: INTERNAL MEDICINE

## 2024-08-25 PROCEDURE — 80061 LIPID PANEL: CPT | Performed by: INTERNAL MEDICINE

## 2024-08-25 PROCEDURE — 25010000002 HEPARIN (PORCINE) PER 1000 UNITS: Performed by: INTERNAL MEDICINE

## 2024-08-25 PROCEDURE — 99204 OFFICE O/P NEW MOD 45 MIN: CPT | Performed by: STUDENT IN AN ORGANIZED HEALTH CARE EDUCATION/TRAINING PROGRAM

## 2024-08-25 PROCEDURE — 25810000003 SODIUM CHLORIDE 0.9 % SOLUTION: Performed by: INTERNAL MEDICINE

## 2024-08-25 PROCEDURE — 96366 THER/PROPH/DIAG IV INF ADDON: CPT

## 2024-08-25 PROCEDURE — 93923 UPR/LXTR ART STDY 3+ LVLS: CPT | Performed by: STUDENT IN AN ORGANIZED HEALTH CARE EDUCATION/TRAINING PROGRAM

## 2024-08-25 PROCEDURE — 82948 REAGENT STRIP/BLOOD GLUCOSE: CPT

## 2024-08-25 PROCEDURE — 25010000002 VANCOMYCIN HCL 1.25 G RECONSTITUTED SOLUTION 1 EACH VIAL: Performed by: INTERNAL MEDICINE

## 2024-08-25 PROCEDURE — 63710000001 INSULIN LISPRO (HUMAN) PER 5 UNITS: Performed by: INTERNAL MEDICINE

## 2024-08-25 RX ORDER — ATORVASTATIN CALCIUM 20 MG/1
40 TABLET, FILM COATED ORAL NIGHTLY
Status: DISCONTINUED | OUTPATIENT
Start: 2024-08-25 | End: 2024-08-27 | Stop reason: HOSPADM

## 2024-08-25 RX ADMIN — SENNOSIDES AND DOCUSATE SODIUM 2 TABLET: 50; 8.6 TABLET ORAL at 20:26

## 2024-08-25 RX ADMIN — HEPARIN SODIUM 5000 UNITS: 5000 INJECTION INTRAVENOUS; SUBCUTANEOUS at 23:32

## 2024-08-25 RX ADMIN — PIPERACILLIN AND TAZOBACTAM 3.38 G: 3; .375 INJECTION, POWDER, FOR SOLUTION INTRAVENOUS at 11:12

## 2024-08-25 RX ADMIN — GADOBENATE DIMEGLUMINE 20 ML: 529 INJECTION, SOLUTION INTRAVENOUS at 10:31

## 2024-08-25 RX ADMIN — HEPARIN SODIUM 5000 UNITS: 5000 INJECTION INTRAVENOUS; SUBCUTANEOUS at 05:23

## 2024-08-25 RX ADMIN — ATORVASTATIN CALCIUM 40 MG: 20 TABLET, FILM COATED ORAL at 20:26

## 2024-08-25 RX ADMIN — INSULIN LISPRO 2 UNITS: 100 INJECTION, SOLUTION INTRAVENOUS; SUBCUTANEOUS at 07:30

## 2024-08-25 RX ADMIN — PIPERACILLIN AND TAZOBACTAM 3.38 G: 3; .375 INJECTION, POWDER, FOR SOLUTION INTRAVENOUS at 17:09

## 2024-08-25 RX ADMIN — VANCOMYCIN HYDROCHLORIDE 1250 MG: 1.25 INJECTION, POWDER, LYOPHILIZED, FOR SOLUTION INTRAVENOUS at 15:42

## 2024-08-25 RX ADMIN — Medication 10 ML: at 11:13

## 2024-08-25 RX ADMIN — FAMOTIDINE 40 MG: 20 TABLET, FILM COATED ORAL at 11:12

## 2024-08-25 RX ADMIN — HEPARIN SODIUM 5000 UNITS: 5000 INJECTION INTRAVENOUS; SUBCUTANEOUS at 16:05

## 2024-08-25 RX ADMIN — Medication 10 ML: at 20:26

## 2024-08-25 RX ADMIN — PIPERACILLIN AND TAZOBACTAM 3.38 G: 3; .375 INJECTION, POWDER, FOR SOLUTION INTRAVENOUS at 02:06

## 2024-08-25 RX ADMIN — SODIUM CHLORIDE 100 ML/HR: 9 INJECTION, SOLUTION INTRAVENOUS at 05:23

## 2024-08-25 NOTE — PLAN OF CARE
Pt. is A&O X 4. No complain of pain. AC&HS. Insulin no given at night. RA. VSS. Wound consult is planning today. Pt. is ready to take MRI and Duplex. Call light within reach.    Goal Outcome Evaluation:  Progress: no change       Problem: Adult Inpatient Plan of Care  Goal: Plan of Care Review  Outcome: Ongoing, Progressing  Flowsheets (Taken 8/25/2024 0535)  Progress: no change  Goal: Patient-Specific Goal (Individualized)  Outcome: Ongoing, Progressing  Goal: Absence of Hospital-Acquired Illness or Injury  Outcome: Ongoing, Progressing  Intervention: Identify and Manage Fall Risk  Recent Flowsheet Documentation  Taken 8/25/2024 0404 by Vitor Smith RN  Safety Promotion/Fall Prevention:   safety round/check completed   room organization consistent   lighting adjusted   fall prevention program maintained   clutter free environment maintained   activity supervised  Taken 8/25/2024 0206 by Vitor Smith RN  Safety Promotion/Fall Prevention:   safety round/check completed   room organization consistent   lighting adjusted   fall prevention program maintained   clutter free environment maintained   activity supervised  Taken 8/25/2024 0001 by Vitor Smith RN  Safety Promotion/Fall Prevention:   safety round/check completed   room organization consistent   lighting adjusted   fall prevention program maintained   clutter free environment maintained   activity supervised  Taken 8/24/2024 2201 by Vitor Smith RN  Safety Promotion/Fall Prevention:   safety round/check completed   room organization consistent   lighting adjusted   fall prevention program maintained   clutter free environment maintained   activity supervised  Taken 8/24/2024 2037 by Vitor Smith RN  Safety Promotion/Fall Prevention:   safety round/check completed   room organization consistent   lighting adjusted   fall prevention program maintained   clutter free environment maintained   activity supervised  Intervention: Prevent Skin Injury  Recent Flowsheet  Documentation  Taken 8/25/2024 0404 by Vitor Smith RN  Body Position: position changed independently  Taken 8/25/2024 0206 by Vitor Smith RN  Body Position: position changed independently  Taken 8/25/2024 0001 by Vitor Smith RN  Body Position: position changed independently  Taken 8/24/2024 2201 by Vitor Smith RN  Body Position: position changed independently  Taken 8/24/2024 2037 by Vitor Smith RN  Body Position: position changed independently  Intervention: Prevent and Manage VTE (Venous Thromboembolism) Risk  Recent Flowsheet Documentation  Taken 8/25/2024 0404 by Vitor Smith RN  Activity Management: up ad kisha  Range of Motion: active ROM (range of motion) encouraged  Taken 8/25/2024 0206 by Vitor Smith RN  Activity Management: up ad kisha  Taken 8/25/2024 0001 by Vitor Smith RN  Activity Management: up ad kisha  Range of Motion: active ROM (range of motion) encouraged  Taken 8/24/2024 2201 by Vitor Smith RN  Activity Management: up ad kisha  Taken 8/24/2024 2037 by Vitor Smith RN  Activity Management: up ad kisha  VTE Prevention/Management: patient refused intervention  Range of Motion: active ROM (range of motion) encouraged  Intervention: Prevent Infection  Recent Flowsheet Documentation  Taken 8/25/2024 0404 by Vitor Smith RN  Infection Prevention:   rest/sleep promoted   single patient room provided   hand hygiene promoted  Taken 8/25/2024 0206 by Vitor Smith RN  Infection Prevention:   rest/sleep promoted   single patient room provided   hand hygiene promoted  Taken 8/25/2024 0001 by Vitor Smith RN  Infection Prevention:   rest/sleep promoted   single patient room provided   hand hygiene promoted  Taken 8/24/2024 2201 by Vitor Smith RN  Infection Prevention:   rest/sleep promoted   single patient room provided   hand hygiene promoted  Taken 8/24/2024 2037 by Vitor Smith RN  Infection Prevention:   rest/sleep promoted   single patient room provided   hand hygiene promoted  Goal: Optimal Comfort and  Wellbeing  Outcome: Ongoing, Progressing  Intervention: Provide Person-Centered Care  Recent Flowsheet Documentation  Taken 8/24/2024 2037 by Vitor Smith RN  Trust Relationship/Rapport:   care explained   choices provided   emotional support provided   empathic listening provided   questions answered   questions encouraged  Goal: Readiness for Transition of Care  Outcome: Ongoing, Progressing

## 2024-08-25 NOTE — PROGRESS NOTES
Name: Marbin Hawkins ADMIT: 2024   : 1965  PCP: Joyce Restrepo APRN    MRN: [id number] LOS: 0 days   AGE/SEX: 59 y.o. male  ROOM: [facility]     Subjective   Subjective   Patient reports no pain of the right first toe.  Decreased swelling of the right first toe and foot..  No discharge from right great toe wound.  But or chills.    Review of Systems  Cardiovascular/respiratory.  No chest pain/no palpitations/no shortness of breath/no  GI.  No abdominal pain or nausea or vomiting.  .  No dysuria or hematuria.     Objective   Objective   Vital Signs  Temp:  [97.3 °F (36.3 °C)-98.1 °F (36.7 °C)] 97.7 °F (36.5 °C)  Heart Rate:  [60-79] 64  Resp:  [16] 16  BP: (100-119)/(60-69) 119/69  SpO2:  [96 %-100 %] 100 %  on   ;   Device (Oxygen Therapy): room air    Intake/Output Summary (Last 24 hours) at 2024 1646  Last data filed at 2024 1110  Gross per 24 hour   Intake 960 ml   Output --   Net 960 ml     Body mass index is 28.56 kg/m².      24  0650 24  1559   Weight: 118 kg (260 lb) 118 kg (260 lb)     Physical Exam  General.  Delay gentleman.  Alert and oriented x 4.  No apparent pain/distress/diaphoresis.  Normal mood and affect.  He is obese.  Eyes.  Pupils equal round and reactive.  Intact extraocular musculature.  No pallor or jaundice.  Oral cavity.  Moist mucous membrane.   neck.  Supple.  No JVD.  No lymphadenopathy or thyromegaly.  Cardiovascular.  Regular rate and rhythm with no gallops or murmurs.  Chest.  Clear to auscultation bilaterally with no added sounds.  Abdomen.  Soft lax.  No tenderness.  No organomegaly.  No guarding or rebound.  Extremities.  No clubbing/cyanosis/edema.  Evidence of lower extremity venous stasis dermatitis bilaterally. Callus at the plantar aspect of the right great toe measuring approximately 3 x 3 cm with a central 0.25 x 0.25 circular wound at the center of the callus without any discharge.  Plus minus fluctuation of the right great toe  associated with swelling.  No lower extremity streaks.  No inguinal lymphadenopathy.  Left third toe with a 0.25 x 1.15 cm blackish discoloration at the tip that appears to be superficial.  Normal lower extremity feet temperature.  Left dorsalis pedis and posterior tibial pulse was not felt.  Right dorsalis pedis pulse is not felt but the right posterior tibial pulse is felt.  No change of the lower extremity examination from yesterday.  CNS.  No acute focal neurological deficits.    Results Review:      Results from last 7 days   Lab Units 08/25/24  0613 08/24/24  0746   SODIUM mmol/L 140 139   POTASSIUM mmol/L 4.3 4.1   CHLORIDE mmol/L 108* 104   CO2 mmol/L 25.0 25.0   BUN mg/dL 12 13   CREATININE mg/dL 0.64* 0.61*   GLUCOSE mg/dL 161* 99   CALCIUM mg/dL 8.8 9.1   AST (SGOT) U/L  --  14   ALT (SGPT) U/L  --  16     Estimated Creatinine Clearance: 186.8 mL/min (A) (by C-G formula based on SCr of 0.64 mg/dL (L)).  Results from last 7 days   Lab Units 08/24/24  0746   HEMOGLOBIN A1C % 5.90*     Results from last 7 days   Lab Units 08/25/24  1625 08/25/24  1109 08/25/24  0612 08/24/24  2035 08/24/24  1622   GLUCOSE mg/dL 147* 124 157* 127 168*                     Results from last 7 days   Lab Units 08/25/24  0613   CHOLESTEROL mg/dL 159   TRIGLYCERIDES mg/dL 182*   HDL CHOL mg/dL 38*     Results from last 7 days   Lab Units 08/25/24  0613 08/24/24  0746   WBC 10*3/mm3 7.09 7.81   HEMOGLOBIN g/dL 13.8 14.1   HEMATOCRIT % 42.0 42.1   PLATELETS 10*3/mm3 213 216   MCV fL 91.7 91.5   MCH pg 30.1 30.7   MCHC g/dL 32.9 33.5   RDW % 12.2* 12.2*   RDW-SD fl 40.7 41.0   MPV fL 9.4 9.0   NEUTROPHIL % % 49.5 50.9   LYMPHOCYTE % % 36.5 33.9   MONOCYTES % % 8.6 9.9   EOSINOPHIL % % 4.7 4.4   BASOPHIL % % 0.4 0.4   IMM GRAN % % 0.3 0.5   NEUTROS ABS 10*3/mm3 3.51 3.98   LYMPHS ABS 10*3/mm3 2.59 2.65   MONOS ABS 10*3/mm3 0.61 0.77   EOS ABS 10*3/mm3 0.33 0.34   BASOS ABS 10*3/mm3 0.03 0.03   IMMATURE GRANS (ABS) 10*3/mm3 0.02 0.04    NRBC /100 WBC 0.0 0.0             Results from last 7 days   Lab Units 08/24/24  1100   LACTATE mmol/L 1.1     Results from last 7 days   Lab Units 08/24/24  0746   SED RATE mm/hr 28*         Results from last 7 days   Lab Units 08/24/24  1723 08/24/24  0942   BLOODCX   --  No growth at 24 hours  No growth at 24 hours   WOUNDCX  Heavy growth (4+) Gram Negative Bacilli*  --                    Imaging:  Imaging Results (Last 24 Hours)       Procedure Component Value Units Date/Time    MRI Foot Right With & Without Contrast [[id number]] Resulted: 08/25/24 1000     Updated: 08/25/24 1040               I reviewed the patient's new clinical results / labs / tests / procedures      Assessment/Plan     Active Hospital Problems    Diagnosis  POA    **Diabetic foot ulcer [E11.621, L97.509]  Yes    Hyperlipidemia [E78.5]  Yes    Hematoma of left third toe [S90.122A]  Yes    Tobacco use [Z72.0]  Yes    Type 2 diabetes mellitus [E11.9]  Yes      Resolved Hospital Problems   No resolved problems to display.         Right great toe diabetic ulcer with infection.  X-ray with no evidence of osteomyelitis.  Awaiting MRI of the right foot.  Lower extremity arterial ultrasound revealed normal arterial circulation on the right but decreased on the left (look below).  MRSA screen is negative and I will stop vancomycin.  Wound culture growing negative bacilli.  Blood cultures are negative till now.  Continue IV vancomycin.  Awaiting vascular surgery consultation.   Left third toe hematoma versus digital ischemia/left lower extremity arterial insufficiency..  Will consult vascular surgery.  Will initiate Lipitor.  Will need antiplatelets after surgical intervention if deemed appropriate.   Type 2 diabetes.  Glucotrol and metformin on hold.  A1c 5.9 indicating good control.  Continue sliding scale insulin.    Dyslipidemia.  LDL and is 90 and the goal is below 70.  Will initiate Lipitor.   Tobacco abuse.  Declined nicotine patch.   Counseled against smoking  VTE prophylaxis.  Subcu heparin.        Discussed my findings and plan of treatment with the patient  Disposition.  To be determined based on clinical course.         Ronald Christianson MD  [facility]  08/25/24  16:46 EDT

## 2024-08-25 NOTE — CONSULTS
Name: Marbin Hawkins ADMIT: 2024   : 1965  PCP: Joyce Restrepo APRN    MRN: [id number] LOS: 0 days   AGE/SEX: 59 y.o. male  ROOM: [facility]     Inpatient Vascular Surgery Consult  Consult performed by: Zaira Burgess MD  Consult ordered by: Ronald Christianson MD Ashlee Ann Vinyard, MD     LOS: 0 days   Patient Care Team:  Joyce Restrepo APRN as PCP - General (Nurse Practitioner)    Subjective     Chief complaint: PAD, bilateral diabetic foot infections    History of Present Illness  59 y.o. male with bilateral toe wounds.  He has had the right toe wound for 2-3 weeks and the left toe wound for about 1 week.  He is a  and smokes 3-4 packs per day of cigarettes.  He denies any claudication or rest pain in the right leg.  He does have thigh claudication in the left leg when he mows the yard, which improves with rest.  He denies rest pain.  He had an injury to the left 4th toe due to his shoes.  He does not take any anticoagulants.        Review of Systems   All other systems reviewed and are negative.      History reviewed. No pertinent past medical history.    Past Surgical History:   Procedure Laterality Date    APPENDECTOMY      CARPAL TUNNEL RELEASE Left        Family History   Problem Relation Age of Onset    Alzheimer's disease Mother     Heart attack Father     Uterine cancer Sister     Lung cancer Brother     No Known Problems Brother     No Known Problems Brother          Social History     Tobacco Use    Smoking status: Every Day     Current packs/day: 3.00     Average packs/day: 3.0 packs/day for 35.0 years (105.0 ttl pk-yrs)     Types: Cigarettes    Smokeless tobacco: Never   Vaping Use    Vaping status: Never Used   Substance Use Topics    Alcohol use: Never    Drug use: Never       Allergies: Patient has no known allergies.    Medications Prior to Admission   Medication Sig Dispense Refill Last Dose    cephalexin (KEFLEX) 500 MG capsule Take 1 capsule by mouth.    8/23/2024    glipizide (GLUCOTROL) 5 MG tablet Take 1 tablet by mouth 2 (Two) Times a Day Before Meals.       metFORMIN (GLUCOPHAGE) 500 MG tablet Take 1 tablet by mouth 2 (Two) Times a Day With Meals.       naproxen (Naprosyn) 500 MG tablet Take 1 tablet by mouth 2 (Two) Times a Day With Meals. 60 tablet 3     SITagliptin (Januvia) 100 MG tablet Take 1 tablet by mouth Daily. 30 tablet 3     testosterone cypionate (DEPO-TESTOSTERONE) 100 MG/ML solution injection Inject 1 mL into the appropriate muscle as directed by prescriber.        atorvastatin, 40 mg, Oral, Nightly  famotidine, 40 mg, Oral, Daily  heparin (porcine), 5,000 Units, Subcutaneous, Q8H  insulin lispro, 2-7 Units, Subcutaneous, 4x Daily AC & at Bedtime  piperacillin-tazobactam, 3.375 g, Intravenous, Q8H  senna-docusate sodium, 2 tablet, Oral, BID  sodium chloride, 10 mL, Intravenous, Q12H      Pharmacy to dose vancomycin,   sodium chloride, 50 mL/hr, Last Rate: 50 mL/hr (08/25/24 7574)        acetaminophen **OR** acetaminophen **OR** acetaminophen    senna-docusate sodium **AND** polyethylene glycol **AND** bisacodyl **AND** bisacodyl    Calcium Replacement - Follow Nurse / BPA Driven Protocol    dextrose    dextrose    glucagon (human recombinant)    Magnesium Standard Dose Replacement - Follow Nurse / BPA Driven Protocol    nitroglycerin    ondansetron ODT **OR** ondansetron    oxyCODONE-acetaminophen    Pharmacy to dose vancomycin    Phosphorus Replacement - Follow Nurse / BPA Driven Protocol    Potassium Replacement - Follow Nurse / BPA Driven Protocol    sodium chloride    sodium chloride      Objective   Temp:  [97.3 °F (36.3 °C)-98.1 °F (36.7 °C)] 97.5 °F (36.4 °C)  Heart Rate:  [60-79] 64  Resp:  [16-18] 18  BP: (100-124)/(60-72) 124/72    I/O this shift:  In: 480 [P.O.:480]  Out: 500 [Urine:500]    Physical Exam  Vitals reviewed.   Constitutional:       General: He is not in acute distress.     Appearance: Normal appearance.   HENT:      Head:  Normocephalic and atraumatic.      Right Ear: External ear normal.      Left Ear: External ear normal.      Nose: Nose normal.      Mouth/Throat:      Mouth: Mucous membranes are moist.      Pharynx: Oropharynx is clear.   Eyes:      Extraocular Movements: Extraocular movements intact.      Conjunctiva/sclera: Conjunctivae normal.      Pupils: Pupils are equal, round, and reactive to light.   Cardiovascular:      Rate and Rhythm: Normal rate and regular rhythm.      Pulses:           Carotid pulses are 2+ on the right side and 2+ on the left side.       Radial pulses are 2+ on the right side and 2+ on the left side.        Femoral pulses are 2+ on the right side and 1+ on the left side.       Dorsalis pedis pulses are 2+ on the right side and detected w/ Doppler on the left side.        Posterior tibial pulses are 2+ on the right side and detected w/ Doppler on the left side.      Comments: Left toe ulceration at the distal aspect of the toe, appears superficial with no erythema or drainage  Pulmonary:      Comments: Non labored respirations on room air, equal chest rise  Abdominal:      General: Abdomen is flat. There is no distension.      Palpations: Abdomen is soft.   Musculoskeletal:      Cervical back: Normal range of motion. No rigidity.      Right lower leg: No edema.      Left lower leg: No edema.   Skin:     General: Skin is warm and dry.      Capillary Refill: Capillary refill takes less than 2 seconds.   Neurological:      General: No focal deficit present.      Mental Status: He is alert and oriented to person, place, and time.   Psychiatric:         Mood and Affect: Mood normal.         Behavior: Behavior normal.         Results from last 7 days   Lab Units 08/25/24  0613 08/24/24  0746   WBC 10*3/mm3 7.09 7.81   HEMOGLOBIN g/dL 13.8 14.1   PLATELETS 10*3/mm3 213 216     Results from last 7 days   Lab Units 08/25/24  0613 08/24/24  0746   SODIUM mmol/L 140 139   POTASSIUM mmol/L 4.3 4.1   CHLORIDE  mmol/L 108* 104   CO2 mmol/L 25.0 25.0   BUN mg/dL 12 13   CREATININE mg/dL 0.64* 0.61*   GLUCOSE mg/dL 161* 99   Estimated Creatinine Clearance: 186.8 mL/min (A) (by C-G formula based on SCr of 0.64 mg/dL (L)).      Imaging Studies:          Data Points:  During this visit the following were done:  Labs Reviewed [x]    Labs Ordered []    Radiology Reports Reviewed [x]    Radiology Images Reviewed []    Radiology Ordered [x]    EKG, echo, and/or stress test reviewed []    Vascular lab results reviewed  []    Vascular lab images reviewed and interpreted per myself   [x]    Referring Provider Records Reviewed []    ER Records Reviewed []    Hospital Records Reviewed/Summarized [x]    History Obtained From Family []    Radiological images view and Interpreted per myself []    Case Discussed with referring provider []     Decision to obtain and request outside records  []    Total data points reviewed: 5      Active Hospital Problems    Diagnosis  POA    **Diabetic foot ulcer [E11.621, L97.509]  Yes    Asymptomatic PVD (peripheral vascular disease) [I73.9]  Yes    Hyperlipidemia [E78.5]  Yes    Hematoma of left third toe [S90.122A]  Yes    Tobacco use [Z72.0]  Yes    Type 2 diabetes mellitus [E11.9]  Yes      Resolved Hospital Problems   No resolved problems to display.         Assessment & Plan       Diabetic foot ulcer    Type 2 diabetes mellitus    Hyperlipidemia    Hematoma of left third toe    Tobacco use    Asymptomatic PVD (peripheral vascular disease)        59 y.o. male with HTN, HLD, type 2 diabetes mellits, and PAD who is a heavy cigarette smoker who has developed a right great toe wound and left toe wound    -I personally and independently reviewed his ABIs with toe pressures.  He has normal perfusion on the right with adequate toe pressures for wound healing.  He has severe PAD on the left, with an JOSE GUADALUPE of 0.4 and toe pressure of 33 mmHg, which is inadequate for wound healing.  Based on his waveforms and  physical exam, he has aorto iliac and tibial disease. I have ordered further imaging of the left foot and a CTA runoff for further evaluation.  I discussed that with his poor perfusion, he has a high risk of that wound not healing on his foot which could lead to limb loss.  He understood.      -MRI of the right foot has been performed, awaiting radiology report.  Have tentatively planned for toe amp tomorrow in the OR pending MRI results          I discussed the patients findings and my recommendations with patient.  Please call my office with any question: [phone]    Zaira Burgess MD  08/25/24  18:55 EDT

## 2024-08-25 NOTE — NURSING NOTE
RN called IV team after unsuccessful peripheral IV attempt; voicemail left requesting peripheral IV placement due to more than one scheduled IV antibiotic. Zosyn currently infusing; will administer vancomycin infusion when additional peripheral IV is placed.

## 2024-08-26 ENCOUNTER — APPOINTMENT (OUTPATIENT)
Dept: MRI IMAGING | Facility: HOSPITAL | Age: 59
End: 2024-08-26
Payer: MEDICAID

## 2024-08-26 ENCOUNTER — PREP FOR SURGERY (OUTPATIENT)
Dept: OTHER | Facility: HOSPITAL | Age: 59
End: 2024-08-26
Payer: MEDICAID

## 2024-08-26 ENCOUNTER — APPOINTMENT (OUTPATIENT)
Dept: CT IMAGING | Facility: HOSPITAL | Age: 59
End: 2024-08-26
Payer: MEDICAID

## 2024-08-26 DIAGNOSIS — I70.245 ATHEROSCLEROSIS OF NATIVE ARTERY OF LEFT LOWER EXTREMITY WITH ULCERATION OF OTHER PART OF FOOT: Primary | ICD-10-CM

## 2024-08-26 PROBLEM — I70.209 ATHEROSCLEROSIS OF NATIVE ARTERY OF EXTREMITY: Status: ACTIVE | Noted: 2024-08-26

## 2024-08-26 LAB
ANION GAP SERPL CALCULATED.3IONS-SCNC: 9.5 MMOL/L (ref 5–15)
BASOPHILS # BLD AUTO: 0.04 10*3/MM3 (ref 0–0.2)
BASOPHILS NFR BLD AUTO: 0.6 % (ref 0–1.5)
BUN SERPL-MCNC: 12 MG/DL (ref 6–20)
BUN/CREAT SERPL: 16.9 (ref 7–25)
CALCIUM SPEC-SCNC: 9.1 MG/DL (ref 8.6–10.5)
CHLORIDE SERPL-SCNC: 105 MMOL/L (ref 98–107)
CO2 SERPL-SCNC: 26.5 MMOL/L (ref 22–29)
CREAT SERPL-MCNC: 0.71 MG/DL (ref 0.76–1.27)
DEPRECATED RDW RBC AUTO: 39.7 FL (ref 37–54)
EGFRCR SERPLBLD CKD-EPI 2021: 106.3 ML/MIN/1.73
EOSINOPHIL # BLD AUTO: 0.36 10*3/MM3 (ref 0–0.4)
EOSINOPHIL NFR BLD AUTO: 5.4 % (ref 0.3–6.2)
ERYTHROCYTE [DISTWIDTH] IN BLOOD BY AUTOMATED COUNT: 12 % (ref 12.3–15.4)
GLUCOSE BLDC GLUCOMTR-MCNC: 114 MG/DL (ref 70–130)
GLUCOSE BLDC GLUCOMTR-MCNC: 124 MG/DL (ref 70–130)
GLUCOSE BLDC GLUCOMTR-MCNC: 129 MG/DL (ref 70–130)
GLUCOSE BLDC GLUCOMTR-MCNC: 148 MG/DL (ref 70–130)
GLUCOSE SERPL-MCNC: 110 MG/DL (ref 65–99)
HCT VFR BLD AUTO: 42.9 % (ref 37.5–51)
HGB BLD-MCNC: 14 G/DL (ref 13–17.7)
IMM GRANULOCYTES # BLD AUTO: 0.03 10*3/MM3 (ref 0–0.05)
IMM GRANULOCYTES NFR BLD AUTO: 0.5 % (ref 0–0.5)
LYMPHOCYTES # BLD AUTO: 2.52 10*3/MM3 (ref 0.7–3.1)
LYMPHOCYTES NFR BLD AUTO: 37.8 % (ref 19.6–45.3)
MCH RBC QN AUTO: 29.8 PG (ref 26.6–33)
MCHC RBC AUTO-ENTMCNC: 32.6 G/DL (ref 31.5–35.7)
MCV RBC AUTO: 91.3 FL (ref 79–97)
MONOCYTES # BLD AUTO: 0.69 10*3/MM3 (ref 0.1–0.9)
MONOCYTES NFR BLD AUTO: 10.4 % (ref 5–12)
NEUTROPHILS NFR BLD AUTO: 3.02 10*3/MM3 (ref 1.7–7)
NEUTROPHILS NFR BLD AUTO: 45.3 % (ref 42.7–76)
NRBC BLD AUTO-RTO: 0 /100 WBC (ref 0–0.2)
PLATELET # BLD AUTO: 225 10*3/MM3 (ref 140–450)
PMV BLD AUTO: 9.3 FL (ref 6–12)
POTASSIUM SERPL-SCNC: 4.4 MMOL/L (ref 3.5–5.2)
RBC # BLD AUTO: 4.7 10*6/MM3 (ref 4.14–5.8)
SODIUM SERPL-SCNC: 141 MMOL/L (ref 136–145)
VANCOMYCIN TROUGH SERPL-MCNC: 4.8 MCG/ML (ref 5–20)
WBC NRBC COR # BLD AUTO: 6.66 10*3/MM3 (ref 3.4–10.8)

## 2024-08-26 PROCEDURE — 25510000001 IOPAMIDOL PER 1 ML: Performed by: INTERNAL MEDICINE

## 2024-08-26 PROCEDURE — 80202 ASSAY OF VANCOMYCIN: CPT | Performed by: INTERNAL MEDICINE

## 2024-08-26 PROCEDURE — 80048 BASIC METABOLIC PNL TOTAL CA: CPT | Performed by: INTERNAL MEDICINE

## 2024-08-26 PROCEDURE — G0378 HOSPITAL OBSERVATION PER HR: HCPCS

## 2024-08-26 PROCEDURE — 99215 OFFICE O/P EST HI 40 MIN: CPT | Performed by: SURGERY

## 2024-08-26 PROCEDURE — 82948 REAGENT STRIP/BLOOD GLUCOSE: CPT

## 2024-08-26 PROCEDURE — 85025 COMPLETE CBC W/AUTO DIFF WBC: CPT | Performed by: INTERNAL MEDICINE

## 2024-08-26 PROCEDURE — 73718 MRI LOWER EXTREMITY W/O DYE: CPT

## 2024-08-26 PROCEDURE — 96372 THER/PROPH/DIAG INJ SC/IM: CPT

## 2024-08-26 PROCEDURE — 25010000002 PIPERACILLIN SOD-TAZOBACTAM PER 1 G: Performed by: INTERNAL MEDICINE

## 2024-08-26 PROCEDURE — 75635 CT ANGIO ABDOMINAL ARTERIES: CPT

## 2024-08-26 PROCEDURE — 25010000002 HEPARIN (PORCINE) PER 1000 UNITS: Performed by: INTERNAL MEDICINE

## 2024-08-26 RX ORDER — IOPAMIDOL 755 MG/ML
100 INJECTION, SOLUTION INTRAVASCULAR
Status: COMPLETED | OUTPATIENT
Start: 2024-08-26 | End: 2024-08-26

## 2024-08-26 RX ADMIN — FAMOTIDINE 40 MG: 20 TABLET, FILM COATED ORAL at 09:07

## 2024-08-26 RX ADMIN — PIPERACILLIN AND TAZOBACTAM 3.38 G: 3; .375 INJECTION, POWDER, FOR SOLUTION INTRAVENOUS at 17:57

## 2024-08-26 RX ADMIN — PIPERACILLIN AND TAZOBACTAM 3.38 G: 3; .375 INJECTION, POWDER, FOR SOLUTION INTRAVENOUS at 09:07

## 2024-08-26 RX ADMIN — Medication 10 ML: at 21:46

## 2024-08-26 RX ADMIN — PIPERACILLIN AND TAZOBACTAM 3.38 G: 3; .375 INJECTION, POWDER, FOR SOLUTION INTRAVENOUS at 05:06

## 2024-08-26 RX ADMIN — HEPARIN SODIUM 5000 UNITS: 5000 INJECTION INTRAVENOUS; SUBCUTANEOUS at 06:48

## 2024-08-26 RX ADMIN — IOPAMIDOL 95 ML: 755 INJECTION, SOLUTION INTRAVENOUS at 10:24

## 2024-08-26 RX ADMIN — HEPARIN SODIUM 5000 UNITS: 5000 INJECTION INTRAVENOUS; SUBCUTANEOUS at 14:14

## 2024-08-26 RX ADMIN — ATORVASTATIN CALCIUM 40 MG: 20 TABLET, FILM COATED ORAL at 21:45

## 2024-08-26 RX ADMIN — SENNOSIDES AND DOCUSATE SODIUM 2 TABLET: 50; 8.6 TABLET ORAL at 09:07

## 2024-08-26 RX ADMIN — HEPARIN SODIUM 5000 UNITS: 5000 INJECTION INTRAVENOUS; SUBCUTANEOUS at 21:45

## 2024-08-26 NOTE — PROGRESS NOTES
Patient seen this morning and results of MRI were discussed.  We had initially discussed amputation if the MRI was positive for osteomyelitis of the right great toe, and it was negative.  We again discussed treatment options.  He had expressed to me that he would like this problem conclusively addressed as soon as possible.  We discussed options, including local wound care vs proceeding with a primary amputation.  He spoke to his wife and they wanted to proceed with local wound care.  I did express my concerns about wound progression with local wound care and communicated that he may progress to digit loss, even with conservative management.  He is a current heavy smoker and smokes 3-4 PPD.      We also discussed his left foot.  He has an ulceration of the toe on the left with inadequate perfusion for wound healing.  He had non invasive testing suggestive or aorto iliac occlusive disease so a CTA was ordered.  I discussed that he will need revascularization of the left leg to promote wound healing, or he could be in danger of limb loss on that leg.      He spoke to his wife and they decided to proceed with local wound care for the right great toe wound.  They requested a second opinion by another vascular surgeon so I asked Dr. Chiu to render his opinion, which is stated in his note.  At their request, he will be assuming the patient's care.  I spoke with Dr. Chiu and explained the clinical history of the patient with him.     Zaira Burgess MD  Vascular Surgery  O: [phone]  F: [phone]     Opt out

## 2024-08-26 NOTE — PROGRESS NOTES
Discharge Planning Assessment  [facility]     Patient Name: Marbin Hawkins  MRN: [id number]  Today's Date: 8/26/2024    Admit Date: 8/24/2024    Plan: Return home with spouse denies any discharge needs   Discharge Needs Assessment    No documentation.                  Discharge Plan       Row Name 08/26/24 1622       Plan    Plan Return home with spouse denies any discharge needs    Plan Comments Spoke with patient at bedside face sheet verified. He stated he is independent with ADL's and denies using any medical equipment. He denies any discharge needs he plans to return home at discharge with his spouse. Caden RUVALCABA                  Continued Care and Services - Admitted Since 8/24/2024    No active coordination exists for this encounter.       Expected Discharge Date and Time       Expected Discharge Date Expected Discharge Time    Aug 31, 2024            Demographic Summary    No documentation.                  Functional Status    No documentation.                  Psychosocial    No documentation.                  Abuse/Neglect    No documentation.                  Legal    No documentation.                  Substance Abuse    No documentation.                  Patient Forms    No documentation.                     Andreina Sanchez, RN

## 2024-08-26 NOTE — CONSULTS
Second opinion consultation    The patient is a 59-year-old gentleman with long history of tobacco use, more than 40 years, who by description has a several year history of left lower extremity claudication.  He is unable to mow more than 2  strips of his yard without having to stop and rest.  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  Resolves with 1 to 2 minutes of rest then recurs at a similar distance.  No rest pain but has developed an ulcer on the fourth toe which has not healed.  He also developed a wound on the plantar aspect of the right great toe from improper fitting footwear.  He presented to the emergency room for this, and was admitted.    Noninvasive arterial testing demonstrated normal perfusion on the right but severe occlusive disease on the left with an JOSE GUADALUPE of 0.4 with toe pressures of 33.  Distribution appeared to be primarily iliofemoral segment.  He had an MRI of the right foot to evaluate for osteomyelitis but there was no acute osteomyelitis of the right great toe.  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  Distal to this no significant occlusive issues.    He was seen in consultation earlier by my associate Dr. Burgess on August 25, 2024 and she recommended right great toe amputation since he had indicated he wanted this addressed as soon as possible.  This was determined prior to his MRI.  Since his MRI he has changed his mind and would rather not have a toe amputation, and I had indicated to him that debridement with dressing changes should address this problem.  In regards to his left lower extremity issues it is very likely that he could have a covered stent placement of the common iliac artery but  because of this being a flush occlusion and with plaque and thrombotic changes noted just cephalad to the aortic bifurcation he would need bilateral kissing stents to address this.  His toe wound should heal with this.  I recommended this procedure and debridement of the right great toe simultaneously.  These procedures, and the risks and benefits of each of these, were discussed in detail with the patient and his wife on the phone.    I discussed this with the patient as well as with his wife on the telephone.  I had indicated to them that Dr. Fitzgerald could do the procedure as soon as tomorrow but they desired that I take over the care and do these procedures.  They understand that I will not be able to do this until Thursday because of office hours the next couple of days.  They were in agreement to do this.    Also had a very long discussion with both patient and his wife that his tobacco use is contributing in a significant way to his vascular issues and that cessation of tobacco use is imperative to prevent further problems.  He does have a clear understanding of this.  We will proceed with the above-mentioned procedure in a few days.  They are in agreement.

## 2024-08-26 NOTE — PLAN OF CARE
Chief Complaint   Patient presents with   • Ear Problem     piercing seem infected       Narrative summary:   Selma is a 18 month old female who is seen for right earlobe redness surrounding piercing area. Mother reports the child was with her parents in the law, she peaked her up earlier today and noticed the redness. Redness was not there 3 days ago. Mother is not sure if child had any kind of trauma to her ear. She believes she is getting infection from recent piercing of her ears. She reports piercing occurred in March of this year. No fever or chills. Child was just started on antibiotic Augmentin 3 days ago for bilateral otitis media. She is tolerating antibiotics without significant problems. Mother reports she is using 14 Kt gold stud earrings.      I have reviewed the patient's medications and allergies, past medical, surgical, social and family history, updating these as appropriate.  See Histories section of the EMR for a display of this information.     There is no problem list on file for this patient.       Health Maintenance   Topic Date Due   • Hepatitis B Vaccine (2 of 3 - Primary Series) 2015   • IPV Vaccine (1 of 4 - All-IPV Series) 2015   • Pneumococcal Vaccine (1 of 3 - Standard Series) 2015   • HIB Vaccine (1 of 2 - Standard Series) 2015   • DTaP/Tdap/Td Vaccine (1 - DTaP) 2015   • MMR Vaccine (1 of 2) 10/03/2016   • Varicella Vaccine (1 of 2 - 2 Dose Childhood Series) 10/03/2016   • Hepatitis A Vaccine (1 of 2 - Standard Series) 10/03/2016   • Influenza Vaccine (Season Ended) 09/01/2017   • Meningococcal Vaccine (1 of 2) 10/03/2026   • Rotavirus Vaccine  Aged Out        REVIEW OF SYSTEMS:  A review of systems in addition to that above remarkable reporting of:  Constitutional:  denies fever or chills.     Skin:  denies rashes, dermatologic changes other than described above.     ENT: denies ear pain, ear discharge. Denies sinus congestion, sinus pain, sore  Goal Outcome Evaluation:              Outcome Evaluation: Patient a/o x4, cooperative with care. Wife at bedside earlier in shift. Pt's wife had numerous questions, this RN answered to the best of my ability. No new issues noted. All meds given as ordered. CTA scheduled for 8/26/24.                                throat.     Respiratory:  denies pulmonary congestion, wheezing, shortness of breath, dyspnea on exertion, cough.  Cardiovascular:  Negative for cardiac problems  Gastrointestinal:  denies abdominal pain, nausea, vomiting, diarrhea, constipation  Allergy/ Immunologic:  denies recurrent infections or allergic symptoms other than described above.       OBJECTIVE:  Vital signs:   Visit Vitals   • Pulse 106   • Temp 98.4 °F (36.9 °C) (Axillary)   • Resp 22   • Ht 31.5\" (80 cm)   • Wt 10.3 kg   • SpO2 99%   • BMI 16.16 kg/m2      General: A White  female in no acute distress.  Alert, cooperative, interactive.  HEENT:   Eyes- EOMI (extraocular muscles intact), PERRLA (pupils equal, round, reactive to light and accommodation), no conjunctival pallor or scleral abnormality. Ears- auditory acuity grossly intact, right earlobe shows some erythema and mild swelling in the area surrounding earing overall mild, no drainage noted  Lungs:  Clear to auscultation.  No accessory muscle use.  Heart:   Regular rate & rhythm, no murmur, S3, S4, heaves or thrills.     Neurologic:   Oriented x 3.  Cranial nerves II-XII intact to observation. No apraxia or ataxia observed.   Musculoskeletal:  There is normal flexor/ extensor tone in both upper & lower extremities.  Skin:  Warm & dry.  No rashes.     ASSESSMENT/PLAN:  1. Complication of ear piercing, right, initial encounter      18-month-old developed complication of ear piercing on the right, mild cellulitis. Patient was already started Augmentin 3 days ago, will continue. Discussed topical care with warm compresses, remove earring temporarily, use topical mupirocin 3 times a day. Follow-up with PCP if symptoms do not improve in 3 days sooner if symptoms get worse. Mother verbalized understanding and agreement with the plan. All questions answered.  See Patient Instructions   Orders Placed This Encounter   • DISCONTD: mupirocin (BACTROBAN) 2 % ointment   • mupirocin (BACTROBAN) 2 %  ointment       No Follow-up on file.

## 2024-08-26 NOTE — PLAN OF CARE
Problem: Adult Inpatient Plan of Care  Goal: Plan of Care Review  Outcome: Ongoing, Progressing  Goal: Patient-Specific Goal (Individualized)  Outcome: Ongoing, Progressing  Goal: Absence of Hospital-Acquired Illness or Injury  Outcome: Ongoing, Progressing  Intervention: Identify and Manage Fall Risk  Recent Flowsheet Documentation  Taken 8/26/2024 1625 by Karissa Perrin, RN  Safety Promotion/Fall Prevention:   activity supervised   assistive device/personal items within reach   clutter free environment maintained   fall prevention program maintained   nonskid shoes/slippers when out of bed   room organization consistent   safety round/check completed  Taken 8/26/2024 1414 by Karissa Perrin, RN  Safety Promotion/Fall Prevention:   activity supervised   assistive device/personal items within reach   clutter free environment maintained   fall prevention program maintained   nonskid shoes/slippers when out of bed   room organization consistent   safety round/check completed  Taken 8/26/2024 1200 by Karissa Perrin, RN  Safety Promotion/Fall Prevention:   activity supervised   assistive device/personal items within reach   clutter free environment maintained   fall prevention program maintained   nonskid shoes/slippers when out of bed   room organization consistent   safety round/check completed  Taken 8/26/2024 1000 by Karissa Perrin, RN  Safety Promotion/Fall Prevention:   activity supervised   assistive device/personal items within reach   clutter free environment maintained   fall prevention program maintained   nonskid shoes/slippers when out of bed   room organization consistent   safety round/check completed  Taken 8/26/2024 0800 by Karissa Perrin, RN  Safety Promotion/Fall Prevention:   activity supervised   assistive device/personal items within reach   clutter free environment maintained   fall prevention program maintained   nonskid shoes/slippers when out of bed   room organization consistent   safety  round/check completed  Intervention: Prevent Skin Injury  Recent Flowsheet Documentation  Taken 8/26/2024 1625 by Karissa Perrin RN  Body Position: position changed independently  Taken 8/26/2024 1414 by Karissa Perrin RN  Body Position: position changed independently  Taken 8/26/2024 1200 by Karissa Perrin RN  Body Position: position changed independently  Taken 8/26/2024 1000 by Karissa Perrin RN  Body Position: position changed independently  Taken 8/26/2024 0800 by Karissa Perrin RN  Body Position: position changed independently  Intervention: Prevent and Manage VTE (Venous Thromboembolism) Risk  Recent Flowsheet Documentation  Taken 8/26/2024 1625 by Karissa Perrin RN  Activity Management: activity encouraged  Taken 8/26/2024 1414 by Karissa Perrin RN  Activity Management: activity encouraged  Taken 8/26/2024 1200 by Karissa Perrin RN  Activity Management: activity encouraged  Taken 8/26/2024 1000 by Karissa Perrin RN  Activity Management: activity encouraged  Taken 8/26/2024 0800 by Karissa Perrin RN  Activity Management: activity encouraged  Intervention: Prevent Infection  Recent Flowsheet Documentation  Taken 8/26/2024 1625 by Karissa Perrin RN  Infection Prevention:   hand hygiene promoted   personal protective equipment utilized   rest/sleep promoted  Taken 8/26/2024 1414 by Karissa Perrin RN  Infection Prevention:   hand hygiene promoted   personal protective equipment utilized   rest/sleep promoted  Taken 8/26/2024 1200 by Karissa Perrin RN  Infection Prevention:   hand hygiene promoted   personal protective equipment utilized   rest/sleep promoted  Taken 8/26/2024 1000 by Karissa Perrin RN  Infection Prevention:   hand hygiene promoted   personal protective equipment utilized   rest/sleep promoted  Taken 8/26/2024 0800 by Karissa Perrin RN  Infection Prevention:   hand hygiene promoted   personal protective equipment utilized   rest/sleep promoted  Goal: Optimal Comfort and Wellbeing  Outcome: Ongoing,  Progressing  Intervention: Provide Person-Centered Care  Recent Flowsheet Documentation  Taken 8/26/2024 1625 by Karissa Perrin, JORDON  Trust Relationship/Rapport:   care explained   choices provided   reassurance provided   thoughts/feelings acknowledged  Taken 8/26/2024 1414 by Karissa Perrin RN  Trust Relationship/Rapport:   care explained   choices provided  Taken 8/26/2024 0900 by Karissa Perrin, RN  Trust Relationship/Rapport:   care explained   choices provided   reassurance provided   thoughts/feelings acknowledged  Goal: Readiness for Transition of Care  Outcome: Ongoing, Progressing   Goal Outcome Evaluation:

## 2024-08-26 NOTE — NURSING NOTE
"Patient called out and notified charge nurse Loretta that he slipped and fell vital signs were done patient stated that he didn't hit his head but hit his back no other complaints notified NP Constanza Braxton  at 1652. Patient stated \"I slipped on the water and all these cords\" plced on fall precaution and bed alarm.  "

## 2024-08-26 NOTE — PROGRESS NOTES
Name: Marbin Hawkins ADMIT: 2024   : 1965  PCP: Joyce Restrepo APRN    MRN: [id number] LOS: 0 days   AGE/SEX: 59 y.o. male  ROOM: [facility]     Subjective   Subjective   He is resting in bed, his family is bedside the are requesting second opinion before proceeding with toe amputation. He offers no complaints at this time.   He and his wife have multiple questions, which I answered to the best of my ability,        Objective   Objective   Vital Signs  Temp:  [97.5 °F (36.4 °C)-97.9 °F (36.6 °C)] 97.9 °F (36.6 °C)  Heart Rate:  [63-70] 63  Resp:  [16-18] 16  BP: (116-131)/(67-82) 124/67  SpO2:  [97 %-100 %] 98 %  on   ;   Device (Oxygen Therapy): room air  Body mass index is 28.56 kg/m².  Physical Exam  Vitals and nursing note reviewed.   Cardiovascular:      Rate and Rhythm: Normal rate. Rhythm irregular.      Pulses: Normal pulses.           Radial pulses are 2+ on the right side and 2+ on the left side.        Dorsalis pedis pulses are 2+ on the right side and 2+ on the left side.        Posterior tibial pulses are 2+ on the right side and 2+ on the left side.      Heart sounds: Normal heart sounds.   Abdominal:      General: Bowel sounds are normal.      Palpations: Abdomen is soft.   Musculoskeletal:         General: Normal range of motion.   Skin:     General: Skin is warm.      Capillary Refill: Capillary refill takes 2 to 3 seconds.      Comments: Right great toe DM wound -area is clean dry, intact,   3rd toe left foot DM wound/ Hematoma    Psychiatric:         Mood and Affect: Mood normal.       Results Review     I reviewed the patient's new clinical results.  Results from last 7 days   Lab Units 24  0651 24  0613 24  0746   WBC 10*3/mm3 6.66 7.09 7.81   HEMOGLOBIN g/dL 14.0 13.8 14.1   PLATELETS 10*3/mm3 225 213 216     Results from last 7 days   Lab Units 24  0651 24  0613 24  0746   SODIUM mmol/L 141 140 139   POTASSIUM mmol/L 4.4 4.3 4.1   CHLORIDE  mmol/L 105 108* 104   CO2 mmol/L 26.5 25.0 25.0   BUN mg/dL 12 12 13   CREATININE mg/dL 0.71* 0.64* 0.61*   GLUCOSE mg/dL 110* 161* 99   EGFR mL/min/1.73 106.3 109.7 111.3     Results from last 7 days   Lab Units 08/24/24  0746   ALBUMIN g/dL 4.2   BILIRUBIN mg/dL 0.4   ALK PHOS U/L 79   AST (SGOT) U/L 14   ALT (SGPT) U/L 16     Results from last 7 days   Lab Units 08/26/24  0651 08/25/24  0613 08/24/24  0746   CALCIUM mg/dL 9.1 8.8 9.1   ALBUMIN g/dL  --   --  4.2     Results from last 7 days   Lab Units 08/24/24  1100   LACTATE mmol/L 1.1     Hemoglobin A1C   Date/Time Value Ref Range Status   08/24/2024 0746 5.90 (H) 4.80 - 5.60 % Final     Glucose   Date/Time Value Ref Range Status   08/26/2024 1114 148 (H) 70 - 130 mg/dL Final   08/26/2024 0641 129 70 - 130 mg/dL Final   08/25/2024 2019 137 (H) 70 - 130 mg/dL Final   08/25/2024 1625 147 (H) 70 - 130 mg/dL Final   08/25/2024 1109 124 70 - 130 mg/dL Final   08/25/2024 0612 157 (H) 70 - 130 mg/dL Final   08/24/2024 2035 127 70 - 130 mg/dL Final       MRI Foot Right With & Without Contrast    Result Date: 8/25/2024  Electronically signed by John Segovia MD on 08-25-24 at 1753     I have personally reviewed all medications:  Scheduled Medications  atorvastatin, 40 mg, Oral, Nightly  famotidine, 40 mg, Oral, Daily  heparin (porcine), 5,000 Units, Subcutaneous, Q8H  insulin lispro, 2-7 Units, Subcutaneous, 4x Daily AC & at Bedtime  piperacillin-tazobactam, 3.375 g, Intravenous, Q8H  senna-docusate sodium, 2 tablet, Oral, BID  sodium chloride, 10 mL, Intravenous, Q12H    Infusions  Pharmacy to dose vancomycin,   sodium chloride, 50 mL/hr, Last Rate: 50 mL/hr (08/25/24 0927)    Diet  Diet: Cardiac, Diabetic; Healthy Heart (2-3 Na+); Consistent Carbohydrate; Fluid Consistency: Thin (IDDSI 0)    I have personally reviewed:  [x]  Laboratory   [x]  Microbiology   [x]  Radiology   [x]  EKG/Telemetry  [x]  Cardiology/Vascular   []  Pathology    []  Records        Assessment/Plan     Active Hospital Problems    Diagnosis  POA    **Diabetic foot ulcer [E11.621, L97.509]  Yes    Asymptomatic PVD (peripheral vascular disease) [I73.9]  Yes    Hyperlipidemia [E78.5]  Yes    Hematoma of left third toe [S90.122A]  Yes    Tobacco use [Z72.0]  Yes    Type 2 diabetes mellitus [E11.9]  Yes      Resolved Hospital Problems   No resolved problems to display.       59 y.o. male admitted with Diabetic foot ulcer.      Diabetic foot ulcer-  Great toe of Right  foot   Vascular surgery consulted and following- appreciate their assistance and recommendations with this  patient   MRI ruled out osteomyelitis   JOSE GUADALUPE- per vascular she has normal perfusion on the right with adequate toe pressures for wound healing.-Severe PAD left with JOSE GUADALUPE of 0.4 and toe pressure 33 mg of mercury, which is adequate at wound healing.-Possible aortoiliac and tibial disease.  CT abdomen with runoff is pending  Vascular surgery seen this AM plan for possible amputation. - Family unsure and would like to discuss in further detail and second opinion,   Hematoma of left 3rd toe noted - he reports from not putting his shoe on correctly.  Has never seen podiatry  Does not do regular diabetic foot checks  Continue Zosyn and vancomycin  Severe PAD  Noted on MRI- JOSE GUADALUPE   Vascular surgery following and managing  appreciate their recommendations   He denies any claudication     Type 2 diabetes mellitus  Chronic   His last hemoglobin A1c is 5.90, that is improved from 10.7 on June 4, 2024  Accu-Cheks ACHS  SSI ordered for hyperglycemia  Hold home metformin, and Januvia    Tobacco use  Chronic   Encourage continued cessation     Asymptomatic PVD (peripheral vascular disease)  Chronic   Monitor for now        SCD's  for DVT prophylaxis.  Full code.  Discussed with patient, family, and care team on multidisciplinary rounds.  Anticipate discharge home with family in 1-2 days.  Expected discharge date/ time has not been  documented.      ADIEL Sanchez  [facility]  08/26/24  12:47 EDT

## 2024-08-26 NOTE — NURSING NOTE
Wound/ostomy - consult received regarding a wound to his toes which is the reason for hospital admission. Vascular has seen patient, patient is scheduled for amputation of the R great toe today. LLE with severe PAD, vascular has discussed risk of limb loss.

## 2024-08-27 VITALS
BODY MASS INDEX: 30.08 KG/M2 | TEMPERATURE: 97.9 F | SYSTOLIC BLOOD PRESSURE: 111 MMHG | HEART RATE: 73 BPM | RESPIRATION RATE: 16 BRPM | HEIGHT: 78 IN | WEIGHT: 260 LBS | OXYGEN SATURATION: 99 % | DIASTOLIC BLOOD PRESSURE: 65 MMHG

## 2024-08-27 LAB
ANION GAP SERPL CALCULATED.3IONS-SCNC: 9 MMOL/L (ref 5–15)
BASOPHILS # BLD AUTO: 0.05 10*3/MM3 (ref 0–0.2)
BASOPHILS NFR BLD AUTO: 0.7 % (ref 0–1.5)
BUN SERPL-MCNC: 15 MG/DL (ref 6–20)
BUN/CREAT SERPL: 25 (ref 7–25)
CALCIUM SPEC-SCNC: 8.7 MG/DL (ref 8.6–10.5)
CHLORIDE SERPL-SCNC: 108 MMOL/L (ref 98–107)
CO2 SERPL-SCNC: 23 MMOL/L (ref 22–29)
CREAT SERPL-MCNC: 0.6 MG/DL (ref 0.76–1.27)
DEPRECATED RDW RBC AUTO: 40.4 FL (ref 37–54)
EGFRCR SERPLBLD CKD-EPI 2021: 111.9 ML/MIN/1.73
EOSINOPHIL # BLD AUTO: 0.32 10*3/MM3 (ref 0–0.4)
EOSINOPHIL NFR BLD AUTO: 4.4 % (ref 0.3–6.2)
ERYTHROCYTE [DISTWIDTH] IN BLOOD BY AUTOMATED COUNT: 12.2 % (ref 12.3–15.4)
GLUCOSE BLDC GLUCOMTR-MCNC: 123 MG/DL (ref 70–130)
GLUCOSE SERPL-MCNC: 116 MG/DL (ref 65–99)
HCT VFR BLD AUTO: 41.6 % (ref 37.5–51)
HGB BLD-MCNC: 14 G/DL (ref 13–17.7)
IMM GRANULOCYTES # BLD AUTO: 0.02 10*3/MM3 (ref 0–0.05)
IMM GRANULOCYTES NFR BLD AUTO: 0.3 % (ref 0–0.5)
LYMPHOCYTES # BLD AUTO: 2.93 10*3/MM3 (ref 0.7–3.1)
LYMPHOCYTES NFR BLD AUTO: 40 % (ref 19.6–45.3)
MCH RBC QN AUTO: 30.8 PG (ref 26.6–33)
MCHC RBC AUTO-ENTMCNC: 33.7 G/DL (ref 31.5–35.7)
MCV RBC AUTO: 91.6 FL (ref 79–97)
MONOCYTES # BLD AUTO: 0.76 10*3/MM3 (ref 0.1–0.9)
MONOCYTES NFR BLD AUTO: 10.4 % (ref 5–12)
NEUTROPHILS NFR BLD AUTO: 3.25 10*3/MM3 (ref 1.7–7)
NEUTROPHILS NFR BLD AUTO: 44.2 % (ref 42.7–76)
NRBC BLD AUTO-RTO: 0 /100 WBC (ref 0–0.2)
PLATELET # BLD AUTO: 218 10*3/MM3 (ref 140–450)
PMV BLD AUTO: 9.4 FL (ref 6–12)
POTASSIUM SERPL-SCNC: 4.1 MMOL/L (ref 3.5–5.2)
RBC # BLD AUTO: 4.54 10*6/MM3 (ref 4.14–5.8)
SODIUM SERPL-SCNC: 140 MMOL/L (ref 136–145)
WBC NRBC COR # BLD AUTO: 7.33 10*3/MM3 (ref 3.4–10.8)

## 2024-08-27 PROCEDURE — 25010000002 HEPARIN (PORCINE) PER 1000 UNITS: Performed by: INTERNAL MEDICINE

## 2024-08-27 PROCEDURE — 96372 THER/PROPH/DIAG INJ SC/IM: CPT

## 2024-08-27 PROCEDURE — 85025 COMPLETE CBC W/AUTO DIFF WBC: CPT | Performed by: INTERNAL MEDICINE

## 2024-08-27 PROCEDURE — 82948 REAGENT STRIP/BLOOD GLUCOSE: CPT

## 2024-08-27 PROCEDURE — 25010000002 PIPERACILLIN SOD-TAZOBACTAM PER 1 G: Performed by: INTERNAL MEDICINE

## 2024-08-27 PROCEDURE — G0378 HOSPITAL OBSERVATION PER HR: HCPCS

## 2024-08-27 PROCEDURE — 80048 BASIC METABOLIC PNL TOTAL CA: CPT | Performed by: INTERNAL MEDICINE

## 2024-08-27 RX ORDER — CEPHALEXIN 500 MG/1
500 CAPSULE ORAL 4 TIMES DAILY
Qty: 12 CAPSULE | Refills: 0 | Status: SHIPPED | OUTPATIENT
Start: 2024-08-27

## 2024-08-27 RX ADMIN — PIPERACILLIN AND TAZOBACTAM 3.38 G: 3; .375 INJECTION, POWDER, FOR SOLUTION INTRAVENOUS at 01:55

## 2024-08-27 RX ADMIN — PIPERACILLIN AND TAZOBACTAM 3.38 G: 3; .375 INJECTION, POWDER, FOR SOLUTION INTRAVENOUS at 09:10

## 2024-08-27 RX ADMIN — Medication 10 ML: at 09:16

## 2024-08-27 RX ADMIN — FAMOTIDINE 40 MG: 20 TABLET, FILM COATED ORAL at 09:10

## 2024-08-27 RX ADMIN — HEPARIN SODIUM 5000 UNITS: 5000 INJECTION INTRAVENOUS; SUBCUTANEOUS at 06:44

## 2024-08-27 NOTE — DISCHARGE SUMMARY
Patient Name: Marbin Hawkins  : 1965  MRN: [id number]    Date of Admission: 2024  Date of Discharge:  2024  Primary Care Physician: Joyce Restrepo APRN      Chief Complaint:   Wound Check      Discharge Diagnoses     Active Hospital Problems    Diagnosis  POA    **Diabetic foot ulcer [E11.621, L97.509]  Yes    Atherosclerosis of native artery of extremity [I70.209]  Unknown    Asymptomatic PVD (peripheral vascular disease) [I73.9]  Yes    Hyperlipidemia [E78.5]  Yes    Hematoma of left third toe [S90.122A]  Yes    Tobacco use [Z72.0]  Yes    Type 2 diabetes mellitus [E11.9]  Yes      Resolved Hospital Problems   No resolved problems to display.        Hospital Course     Mr. Hawkins is a 59 y.o. male with a history of type 2 diabetes mellitus, arthrosclerosis, PVD, HLD, tobacco use, diabetic foot ulcer of right great toe, and left third toe who presented to [facility] initially complaining of poor healing of right foot toe diabetic ulcer.  Please see the admitting history and physical for further details.  He was found to have right great toe diabetic foot ulcer, left third toe hematoma, severe PAD of right leg, and was admitted to the hospital for further evaluation and treatment.  He was evaluated by vascular surgery who is recommended initially toe amputation, after MRI found no osteomyelitis patient has decided to not have toe amputation, despite risk of future digit loss. He has been treated with IV antibiotics with good response, and will be transitioned to oral antibiotics.   His blood glucose levels have been well managed on sliding scale insulin.  He is normally on metformin and has been instructed to hold, 48 hours after MRI procedure, he may resume on Thursday after surgical procedure unless otherwise indicated by vascular surgery after procedure.  Mr. Hawkins has been advised the importance of smoking cessation, and his vascular health, regular foot exams by podiatry, as  well as management of his glucose.  He is currently stable, and ready for discharged with instructions for follow up, with Dr. Jes Chiu on Thursday for revascularization procedure.      Day of Discharge     Subjective:  He is very eager to be discharged home, verbalizes understanding that he will need to follow-up with vascular surgery on Monday for revascularization procedure, and he needs to be n.p.o. after midnight on Wednesday, August 28, 2024..  Advised that Dr. Jes Chiu's office will call him with any details.    Physical Exam:  Temp:  [97.9 °F (36.6 °C)-98.6 °F (37 °C)] 97.9 °F (36.6 °C)  Heart Rate:  [63-89] 73  Resp:  [16-18] 16  BP: (108-124)/(65-97) 111/65  Body mass index is 28.56 kg/m².  Physical Exam  Vitals and nursing note reviewed.   Constitutional:       General: He is awake.      Appearance: Normal appearance. He is not ill-appearing.   HENT:      Mouth/Throat:      Mouth: Mucous membranes are dry.   Eyes:      Conjunctiva/sclera: Conjunctivae normal.   Cardiovascular:      Rate and Rhythm: Normal rate and regular rhythm.      Pulses:           Radial pulses are 2+ on the right side and 2+ on the left side.        Dorsalis pedis pulses are 1+ on the right side and 1+ on the left side.        Posterior tibial pulses are 1+ on the right side and 1+ on the left side.      Heart sounds: Normal heart sounds. No murmur heard.     Comments: Left foot cool to touch, no cyanosis, or necrosis noted.  Pulmonary:      Effort: Pulmonary effort is normal.      Breath sounds: Examination of the right-lower field reveals decreased breath sounds. Examination of the left-lower field reveals decreased breath sounds. Decreased breath sounds present.   Abdominal:      General: Bowel sounds are normal.      Palpations: Abdomen is soft.   Musculoskeletal:      Right lower leg: No edema.      Left lower leg: No edema.   Skin:     General: Skin is warm and dry.      Capillary Refill: Capillary refill takes less  than 2 seconds.      Findings: Wound present.      Comments: Right foot great toe, and left third toe   Neurological:      General: No focal deficit present.      Mental Status: He is alert and oriented to person, place, and time. Mental status is at baseline.   Psychiatric:         Mood and Affect: Mood normal.         Behavior: Behavior is cooperative.         Consultants     Consult Orders (all) (From admission, onward)       Start     Ordered    08/25/24 1640  Inpatient Vascular Surgery Consult  Once        Specialty:  Vascular Surgery  Provider:  Zaira Burgess MD    08/25/24 1640    08/24/24 1039  [facility] (on-call MD unless specified) Details  Once        Specialty:  Hospitalist  Provider:  Ronald Christianson MD    08/24/24 1038                  Procedures     AMPUTATION DIGIT    Imaging Results (All)       Procedure Component Value Units Date/Time    MRI Foot Left Without Contrast [[id number]] Collected: 08/27/24 0437     Updated: 08/27/24 0437    Narrative:        Patient: ERICK NELSON  Time Out: 04:36  Exam(s): MRI LEFT FOOT Without Contrast     EXAM:    MR Left Lower Extremity Without Intravenous Contrast, Foot    CLINICAL HISTORY:     Reason for exam: decreased profusion.    TECHNIQUE:    Multiplanar magnetic resonance images of the left foot without   intravenous contrast.    COMPARISON:    No relevant prior studies available.    FINDINGS:    Fluid:  Unremarkable.  No fluid collection or abscess.  No   intermetatarsal bursitis.      Bones joints:  Edema in the fourth toe distal phalanx with marrow   infiltration, concerning for osteomyelitis.  Callus formation plantar to   the first metatarsal head.  No acute fracture.      Soft tissues:  Poor fat suppression of the toes limits evaluation for   edema.    IMPRESSION:         Edema in the fourth toe distal phalanx with marrow infiltration,   concerning for osteomyelitis.  Correlate for overlying wound or ulcer.      No fluid collection or abscess.       Impression:          Electronically signed by Blaine Barillas MD on 08-27-24 at 0436    CT Angio Abdominal Aorta Bilateral Iliofem Runoff [[id number]] Collected: 08/26/24 1308     Updated: 08/26/24 1339    Narrative:      CT ANGIO ABDOMINAL AORTA BILAT ILIOFEM RUNOFF-     INDICATION: Peripheral artery disease     COMPARISON: None     TECHNIQUE:  CTA abdomen/pelvis with bilateral lower extremity runoff with IV  contrast. Coronal and sagittal reformats. Three dimensional  reconstructions. Radiation dose reduction techniques were utilized,  including automated exposure control and exposure modulation based on  body size.     FINDINGS:      Lung bases: Small amount of subsegmental atelectasis seen at the bases     Vasculature: Celiac axis is patent, without stenosis. Small amount of  atherosclerotic disease seen in the superior mesenteric artery, without  a significant stenosis. Bilateral renal arteries are patent, without  significant stenoses. ANTON is patent. Slight ectasia of the infrarenal  abdominal aorta measuring 2.1 cm. Abdominal aortic atherosclerotic  disease, most severe in the infrarenal abdominal aorta, with 50%  stenosis.     Right-sided vasculature: Atherosclerotic disease in the common iliac  artery, with some calcified atherosclerotic disease protruding into the  proximal vessel lumen with greater than 70% stenosis. Aneurysmal distal  common carotid artery, series 7, axial image 975, measuring 1.5 cm, with  atherosclerotic ulcerations and approximate 50% stenosis. Internal iliac  artery is occluded approximately followed by reconstitution. External  iliac artery is patent without stenosis. Atherosclerotic disease in the  common femoral artery, without significant stenosis. Profunda femoral  artery is patent without stenosis. Bulky calcific atherosclerotic  disease in the mid/distal SFA with greater than 70% stenosis. Calcific  atherosclerotic disease in the distal SFA with less than 50%  stenosis.  Atherosclerotic disease in the popliteal artery, without a high-grade  stenosis seen. Suboptimal opacification of calf arteries with suspected  three-vessel runoff to the ankle.     Left-sided vasculature: Atherosclerotic disease in the common iliac  artery, with vessel occlusion with reconstitution just before the  bifurcation. Internal iliac artery is patent, with less than 50%  stenosis. External iliac artery is patent, without stenosis.  Atherosclerotic disease in the common femoral artery, with less than 50%  stenosis. Profunda femoris arteries patent, without stenosis.  Atherosclerotic disease in the superficial femoral artery with less than  50% stenoses seen in the mid and distal vessel. Popliteal artery is  patent without significant stenosis. Three-vessel runoff to the foot.     ABDOMEN: Questionable chronic liver disease with caudate enlargement.  Contracted gallbladder. No biliary ductal dilatation. Spleen is normal  in size. Calcifications in the pancreas, consistent with chronic  pancreatitis. No pancreatic ductal dilatation or mass seen. Mild  thickening of the adrenal glands. No renal mass or hydronephrosis.     Pelvis: Prostate is normal in size. Underdistended bladder. No bladder  calculus.     Bowel: No obstruction. Appendix not identified though no secondary  findings of appendicitis.     Abdominal wall: Small fat-containing umbilical hernia. Right pelvic wall  scarring. Mildly prominent right groin lymph nodes.     Retroperitoneum: Mildly prominent distal right external iliac chain  lymph nodes. For example, distal right external iliac chain lymph node,  series 4, axial image 264, measures 1.2 cm, may be reactive.     Osseous structures: No destructive osseous lesions. Disc osteophyte  complexes seen at T12/L1, L1/L2, L2/L3 and L4/L5.       Impression:         1. Abdominal aortic atherosclerotic disease with 50% stenosis in the  infrarenal abdominal aorta.  2. Atherosclerotic disease  in the right common iliac artery with  high-grade stenosis. Right internal iliac artery is occluded.  Atherosclerotic disease in the superficial femoral artery with greater  than 70% stenosis in the mid/distal SFA. Three-vessel runoff to the  right foot.  3. Left common iliac artery is occluded with reconstitution just before  the bifurcation. Multifocal less than 50% stenoses in the superficial  femoral artery. Three-vessel runoff to the left foot.  4. Chronic pancreatitis.  5. Possible chronic liver disease     This report was finalized on 8/26/2024 1:36 PM by Dr. Roni Matias M.D on Workstation: [id number]       XR Toe 2+ View Left [[id number]] Collected: 08/25/24 1814     Updated: 08/25/24 1818    Narrative:      LEFT TOE     HISTORY: Wound.     COMPARISON: None.     FINDINGS: 3 views of the left fourth digit demonstrate soft tissue  prominence distally. There is no evidence of fracture, bony destruction  or erosion. Osteomyelitis cannot be excluded. Further evaluation could  be performed with a MRI examination of the foot/toe.     This report was finalized on 8/25/2024 6:15 PM by Dr. Lew Tejeda M.D  on Workstation: [id number]       MRI Foot Right With & Without Contrast [[id number]] Collected: 08/25/24 1754     Updated: 08/25/24 1754    Narrative:        Patient: ERICK NELSON  Time Out: 17:53  Exam(s): MRI RIGHT FOOT W WO Contrast     EXAM:    MR Right Lower Extremity Without and With Intravenous Contrast, Foot    CLINICAL HISTORY:     Reason for exam: Right great toe diabetic ulcer.    TECHNIQUE:    Multiplanar magnetic resonance images of the right foot without and   with intravenous contrast.    COMPARISON:  X-ray 8 24 2024    FINDINGS:  plantar soft tissue ulcer of the first toe at the level of the IP joint.    Normal marrow signal.  No acute osteomyelitis.  No septic arthritis.  No   abscess.  No tenosynovitis.    IMPRESSION:       1.  Plantar ulcer of the first toe.  No osteomyelitis.     Impression:          Electronically signed by John Segovia MD on 08-25-24 at 1753    XR Toe 2+ View Right [[id number]] Collected: 08/24/24 0913     Updated: 08/24/24 0917    Narrative:      XR TOE 2+ VW RIGHT-     INDICATIONS: Wound for 2 weeks.     TECHNIQUE: 3 VIEWS OF THE RIGHT GREAT TOE     COMPARISON: None available     FINDINGS:     Soft tissue swelling and ulceration are apparent at the plantar aspect  of the great toe, suggesting soft tissue infection, correlate  clinically. No acute fracture, erosion, or dislocation is identified. If  there is clinical suspicion for radiographically occult osteomyelitis,  MRI or bone scan can be obtained.       Impression:         As described.           This report was finalized on 8/24/2024 9:14 AM by Dr. Aram Hall M.D on Workstation: [id number]             Results for orders placed during the hospital encounter of 08/24/24    Doppler Arterial Multi Level Lower Extremity - Bilateral CAR    Interpretation Summary    Right Conclusion: The right JOSE GUADALUPE is normal. Normal digital pressures.    Left Conclusion: The left JOSE GUADALUPE is severely reduced. Waveforms are consistent with aorto-iliac disease and tib-peroneal disease. Moderate digital insufficiency.      Pertinent Labs     Results from last 7 days   Lab Units 08/27/24  0628 08/26/24  0651 08/25/24  0613 08/24/24  0746   WBC 10*3/mm3 7.33 6.66 7.09 7.81   HEMOGLOBIN g/dL 14.0 14.0 13.8 14.1   PLATELETS 10*3/mm3 218 225 213 216     Results from last 7 days   Lab Units 08/27/24  0628 08/26/24  0651 08/25/24  0613 08/24/24  0746   SODIUM mmol/L 140 141 140 139   POTASSIUM mmol/L 4.1 4.4 4.3 4.1   CHLORIDE mmol/L 108* 105 108* 104   CO2 mmol/L 23.0 26.5 25.0 25.0   BUN mg/dL 15 12 12 13   CREATININE mg/dL 0.60* 0.71* 0.64* 0.61*   GLUCOSE mg/dL 116* 110* 161* 99   EGFR mL/min/1.73 111.9 106.3 109.7 111.3     Results from last 7 days   Lab Units 08/24/24  0746   ALBUMIN g/dL 4.2   BILIRUBIN mg/dL 0.4   ALK PHOS U/L 79   AST  (SGOT) U/L 14   ALT (SGPT) U/L 16     Results from last 7 days   Lab Units 08/27/24  0628 08/26/24  0651 08/25/24  0613 08/24/24  0746   CALCIUM mg/dL 8.7 9.1 8.8 9.1   ALBUMIN g/dL  --   --   --  4.2           Results from last 7 days   Lab Units 08/25/24  0613   CHOLESTEROL mg/dL 159   TRIGLYCERIDES mg/dL 182*   HDL CHOL mg/dL 38*   LDL CHOL mg/dL 90     Results from last 7 days   Lab Units 08/24/24  1724 08/24/24  1723 08/24/24  0942   BLOODCX   --   --  No growth at 2 days  No growth at 2 days   WOUNDCX   --  Heavy growth (4+) Pseudomonas aeruginosa*  Heavy growth (4+) Enterococcus faecalis*  --    MRSAPCR  No MRSA Detected  --   --          Test Results Pending at Discharge     Pending Labs       Order Current Status    [place] Draw In process    Blood Culture - Blood, Arm, Right Preliminary result    Blood Culture - Blood, Arm, Right Preliminary result    Wound Culture - Wound, Toe, Right Preliminary result            Discharge Details        Discharge Medications        Changes to Medications        Instructions Start Date   cephalexin 500 MG capsule  Commonly known as: Keflex  What changed: when to take this   500 mg, Oral, 4 Times Daily             Continue These Medications        Instructions Start Date   glipizide 5 MG tablet  Commonly known as: GLUCOTROL   5 mg, Oral, 2 Times Daily Before Meals      metFORMIN 500 MG tablet  Commonly known as: GLUCOPHAGE   500 mg, Oral, 2 Times Daily With Meals   Start Date: August 28, 2024     naproxen 500 MG tablet  Commonly known as: Naprosyn   500 mg, Oral, 2 Times Daily With Meals      SITagliptin 100 MG tablet  Commonly known as: Januvia   100 mg, Oral, Daily      testosterone cypionate 100 MG/ML solution injection  Commonly known as: DEPO-TESTOSTERONE   100 mg, Intramuscular               No Known Allergies    Discharge Disposition:  Home or Self Care      Discharge Diet:  Diet Order   Procedures    Diet: Cardiac, Diabetic; Healthy Heart (2-3 Na+); Consistent  Carbohydrate; Fluid Consistency: Thin (IDDSI 0)       Discharge Activity:       CODE STATUS:    Code Status and Medical Interventions: CPR (Attempt to Resuscitate); Full Support   Ordered at: 08/24/24 1604     Code Status (Patient has no pulse and is not breathing):    CPR (Attempt to Resuscitate)     Medical Interventions (Patient has pulse or is breathing):    Full Support       No future appointments.   Follow-up Information       Joyce Restrepo APRN .    Specialty: Nurse Practitioner  Contact information:  [address]  [place] [address]  [phone]                             Time Spent on Discharge:  Greater than 30 minutes      ADIEL Sanchez  [facility]  08/27/24  09:01 EDT

## 2024-08-27 NOTE — DISCHARGE INSTRUCTIONS
Dr Chiu would like you to present to surgery registration at 7 AM Thursday morning.  You will need to be n.p.o. after midnight Wednesday 08/28/24-his office will call you with any other details and specifics or change in time.

## 2024-08-27 NOTE — PLAN OF CARE
Goal Outcome Evaluation:              Outcome Evaluation: Patient a/o x4, frustrated this shift. Pt originally wanted to wait on wife to discuss MRI before agreeing to it, this RN understood pt to refuse MRI and cancelled it.  When wife arrived pt agreed to MRI and orders were placed again. MRI performed. Pt has been pleasant and cooperative for rest of shift with no issues. All meds given as ordered. Pt states he does not want any stool softners, as they are causing him diarhea. See v/s and labs.

## 2024-08-27 NOTE — PROGRESS NOTES
Discharge Planning Assessment  [facility]     Patient Name: Marbin Hawkins  MRN: [id number]  Today's Date: 8/27/2024    Admit Date: 8/24/2024    Plan: Home   Discharge Needs Assessment    No documentation.                  Discharge Plan       Row Name 08/27/24 1226       Plan    Plan Home    Final Discharge Disposition Code 01 - home or self-care    Final Note Home                  Continued Care and Services - Discharged on 8/27/2024 Admission date: 8/24/2024 - Discharge disposition: Home or Self Care   No active coordination exists for this encounter.       Expected Discharge Date and Time       Expected Discharge Date Expected Discharge Time    Aug 27, 2024            Demographic Summary    No documentation.                  Functional Status    No documentation.                  Psychosocial    No documentation.                  Abuse/Neglect    No documentation.                  Legal    No documentation.                  Substance Abuse    No documentation.                  Patient Forms    No documentation.                     Andreina Sanchez RN

## 2024-08-28 ENCOUNTER — READMISSION MANAGEMENT (OUTPATIENT)
Dept: CALL CENTER | Facility: HOSPITAL | Age: 59
End: 2024-08-28
Payer: MEDICAID

## 2024-08-28 LAB
BACTERIA SPEC AEROBE CULT: ABNORMAL
BACTERIA SPEC AEROBE CULT: ABNORMAL
GRAM STN SPEC: ABNORMAL

## 2024-08-28 PROCEDURE — S0260 H&P FOR SURGERY: HCPCS | Performed by: SURGERY

## 2024-08-28 NOTE — H&P
History of present illness: The patient is a 59-year-old gentleman with long history of tobacco use, more than 40 years, who by description has a several year history of left lower extremity claudication.  He is unable to mow more than 2  strips of his yard without having to stop and rest.  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  Resolves with 1 to 2 minutes of rest then recurs at a similar distance.  No rest pain but has developed an ulcer on the fourth toe which has not healed.  He also developed a wound on the plantar aspect of the right great toe from improper fitting footwear.  He presented to the emergency room for this, and was admitted.     Noninvasive arterial testing demonstrated normal perfusion on the right but severe occlusive disease on the left with an JOSE GUADALUPE of 0.4 with toe pressures of 33.  Distribution appeared to be primarily iliofemoral segment.  He had an MRI of the right foot to evaluate for osteomyelitis but there was no acute osteomyelitis of the right great toe.  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  Distal to this no significant occlusive issues.     He was seen in consultation earlier by my associate Dr. Burgess on August 25, 2024 and she recommended right great toe amputation since he had indicated he wanted this addressed as soon as possible.  This was determined prior to his MRI.  Since his MRI he has changed his mind and would rather not have a toe amputation, and I had indicated to him that debridement with dressing changes should address this problem.  In regards to his left lower extremity issues it is very likely that he could have a covered stent placement of the common iliac artery but because of this being a flush occlusion and with  plaque and thrombotic changes noted just cephalad to the aortic bifurcation he would need bilateral kissing stents to address this.  His toe wound should heal with this.  I recommended this procedure and debridement of the right great toe simultaneously.  These procedures, and the risks and benefits of each of these, were discussed in detail with the patient and his wife on the phone.  Past medical history, Social history, Family history, and review of systems are well-documented in the electronic medical records from his admission just 3 days ago.  No change from that time.    Physical examination: Well-developed well-nourished gentleman no acute distress awake, alert, oriented x 3  HEENT: Normocephalic and atraumatic, extraocular movements intact,  Neck: Supple, range of motion, no JVD  Heart: Normal sinus rhythm without murmur  Chest: Equal bilateral expansion and symmetrical.  Unlabored breathing.  Clear to auscultation.  Abdomen: Soft and benign no masses palpated  Neuro: Grossly intact without focal deficit  Extremities: Strong palpable femoral pulse on the right no palpable femoral pulse on the left with trace to 1+ popliteal and pedal pulses on the right no palpable pulses on the left.  Both feet warm well-perfused.  There is a small eschar to the tip of the left fourth toe without signs of infection.  At the base of the right great toe there is a small ulcer with thick callus surrounding this.    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  2) traumatic ulcer right first toe without signs of osteomyelitis.    Plan: Traversal of the left common iliac artery occlusion with covered stent placement.  Would likely need kissing stents due to the location of the occlusion on the left extending flush to the aorta.  Following this we do debridement of the callus of the right great toe.  I discussed this with the patient as well as with his wife on  the telephone.  They were in agreement to do this.     Also had a very long discussion with both patient and his wife that his tobacco use is contributing in a significant way to his vascular issues and that cessation of tobacco use is imperative to prevent further problems.  He does have a clear understanding of this.  We will proceed with the above-mentioned procedure in a few days.  They are in agreement.

## 2024-08-28 NOTE — OUTREACH NOTE
Prep Survey      Flowsheet Row Responses   [facility] facility patient discharged from? [place]   Is LACE score < 7 ? No   Eligibility Readm Mgmt   Discharge diagnosis *Diabetic foot ulcer   Does the patient have one of the following disease processes/diagnoses(primary or secondary)? Other   Does the patient have Home health ordered? No   Is there a DME ordered? No   Prep survey completed? Yes            ALFONSO LOCKWOOD - Registered Nurse

## 2024-08-29 ENCOUNTER — READMISSION MANAGEMENT (OUTPATIENT)
Dept: CALL CENTER | Facility: HOSPITAL | Age: 59
End: 2024-08-29
Payer: MEDICAID

## 2024-08-29 ENCOUNTER — ANESTHESIA EVENT (OUTPATIENT)
Dept: PERIOP | Facility: HOSPITAL | Age: 59
End: 2024-08-29
Payer: MEDICAID

## 2024-08-29 ENCOUNTER — APPOINTMENT (OUTPATIENT)
Dept: GENERAL RADIOLOGY | Facility: HOSPITAL | Age: 59
End: 2024-08-29
Payer: MEDICAID

## 2024-08-29 ENCOUNTER — ANESTHESIA (OUTPATIENT)
Dept: PERIOP | Facility: HOSPITAL | Age: 59
End: 2024-08-29
Payer: MEDICAID

## 2024-08-29 ENCOUNTER — HOSPITAL ENCOUNTER (OUTPATIENT)
Facility: HOSPITAL | Age: 59
Setting detail: HOSPITAL OUTPATIENT SURGERY
Discharge: HOME | End: 2024-08-29
Attending: SURGERY | Admitting: SURGERY
Payer: MEDICAID

## 2024-08-29 VITALS
OXYGEN SATURATION: 100 % | RESPIRATION RATE: 16 BRPM | SYSTOLIC BLOOD PRESSURE: 120 MMHG | BODY MASS INDEX: 31.13 KG/M2 | HEIGHT: 78 IN | TEMPERATURE: 97.5 F | WEIGHT: 269.1 LBS | HEART RATE: 71 BPM | DIASTOLIC BLOOD PRESSURE: 76 MMHG

## 2024-08-29 DIAGNOSIS — I70.245 ATHEROSCLEROSIS OF NATIVE ARTERY OF LEFT LOWER EXTREMITY WITH ULCERATION OF OTHER PART OF FOOT: Primary | ICD-10-CM

## 2024-08-29 LAB
BACTERIA SPEC AEROBE CULT: NORMAL
BACTERIA SPEC AEROBE CULT: NORMAL
GLUCOSE BLDC GLUCOMTR-MCNC: 112 MG/DL (ref 70–130)
GLUCOSE BLDC GLUCOMTR-MCNC: 114 MG/DL (ref 70–130)

## 2024-08-29 PROCEDURE — 25810000003 LACTATED RINGERS PER 1000 ML: Performed by: ANESTHESIOLOGY

## 2024-08-29 PROCEDURE — C1894 INTRO/SHEATH, NON-LASER: HCPCS | Performed by: SURGERY

## 2024-08-29 PROCEDURE — C1887 CATHETER, GUIDING: HCPCS | Performed by: SURGERY

## 2024-08-29 PROCEDURE — 37221 PR REVSC OPN/PRQ ILIAC ART W/STNT PLMT & ANGIOPLSTY: CPT | Performed by: SURGERY

## 2024-08-29 PROCEDURE — 25010000002 CEFAZOLIN PER 500 MG: Performed by: SURGERY

## 2024-08-29 PROCEDURE — C1874 STENT, COATED/COV W/DEL SYS: HCPCS | Performed by: SURGERY

## 2024-08-29 PROCEDURE — C1769 GUIDE WIRE: HCPCS | Performed by: SURGERY

## 2024-08-29 PROCEDURE — 25010000002 HEPARIN (PORCINE) PER 1000 UNITS: Performed by: SURGERY

## 2024-08-29 PROCEDURE — 25010000002 HYDROMORPHONE PER 4 MG: Performed by: NURSE ANESTHETIST, CERTIFIED REGISTERED

## 2024-08-29 PROCEDURE — 25010000002 ONDANSETRON PER 1 MG: Performed by: NURSE ANESTHETIST, CERTIFIED REGISTERED

## 2024-08-29 PROCEDURE — C1760 CLOSURE DEV, VASC: HCPCS | Performed by: SURGERY

## 2024-08-29 PROCEDURE — 25010000002 PROTAMINE SULFATE PER 10 MG: Performed by: NURSE ANESTHETIST, CERTIFIED REGISTERED

## 2024-08-29 PROCEDURE — 11043 DBRDMT MUSC&/FSCA 1ST 20/<: CPT | Performed by: SURGERY

## 2024-08-29 PROCEDURE — 37221 PR REVSC OPN/PRQ ILIAC ART W/STNT PLMT & ANGIOPLSTY: CPT

## 2024-08-29 PROCEDURE — 25510000001 IOPAMIDOL PER 1 ML: Performed by: SURGERY

## 2024-08-29 PROCEDURE — C1725 CATH, TRANSLUMIN NON-LASER: HCPCS | Performed by: SURGERY

## 2024-08-29 PROCEDURE — 82948 REAGENT STRIP/BLOOD GLUCOSE: CPT

## 2024-08-29 PROCEDURE — 25010000002 CEFAZOLIN 3 G RECONSTITUTED SOLUTION 1 EACH VIAL: Performed by: SURGERY

## 2024-08-29 PROCEDURE — 25010000002 HEPARIN (PORCINE) PER 1000 UNITS: Performed by: NURSE ANESTHETIST, CERTIFIED REGISTERED

## 2024-08-29 PROCEDURE — 25010000002 FENTANYL CITRATE (PF) 50 MCG/ML SOLUTION: Performed by: NURSE ANESTHETIST, CERTIFIED REGISTERED

## 2024-08-29 PROCEDURE — 75630 X-RAY AORTA LEG ARTERIES: CPT | Performed by: SURGERY

## 2024-08-29 PROCEDURE — 25010000002 PROPOFOL 200 MG/20ML EMULSION: Performed by: NURSE ANESTHETIST, CERTIFIED REGISTERED

## 2024-08-29 RX ORDER — LIDOCAINE HYDROCHLORIDE 10 MG/ML
0.5 INJECTION, SOLUTION INFILTRATION; PERINEURAL ONCE AS NEEDED
Status: DISCONTINUED | OUTPATIENT
Start: 2024-08-29 | End: 2024-08-29 | Stop reason: HOSPADM

## 2024-08-29 RX ORDER — DROPERIDOL 2.5 MG/ML
0.62 INJECTION, SOLUTION INTRAMUSCULAR; INTRAVENOUS
Status: DISCONTINUED | OUTPATIENT
Start: 2024-08-29 | End: 2024-08-30 | Stop reason: HOSPADM

## 2024-08-29 RX ORDER — FENTANYL CITRATE 50 UG/ML
INJECTION, SOLUTION INTRAMUSCULAR; INTRAVENOUS AS NEEDED
Status: DISCONTINUED | OUTPATIENT
Start: 2024-08-29 | End: 2024-08-29 | Stop reason: SURG

## 2024-08-29 RX ORDER — SODIUM CHLORIDE 0.9 % (FLUSH) 0.9 %
3 SYRINGE (ML) INJECTION EVERY 12 HOURS SCHEDULED
Status: DISCONTINUED | OUTPATIENT
Start: 2024-08-29 | End: 2024-08-29 | Stop reason: HOSPADM

## 2024-08-29 RX ORDER — NALOXONE HCL 0.4 MG/ML
0.2 VIAL (ML) INJECTION AS NEEDED
Status: DISCONTINUED | OUTPATIENT
Start: 2024-08-29 | End: 2024-08-30 | Stop reason: HOSPADM

## 2024-08-29 RX ORDER — CLOPIDOGREL BISULFATE 75 MG/1
75 TABLET ORAL DAILY
Qty: 30 TABLET | Refills: 2 | Status: SHIPPED | OUTPATIENT
Start: 2024-08-29 | End: 2025-08-29

## 2024-08-29 RX ORDER — IOPAMIDOL 510 MG/ML
200 INJECTION, SOLUTION INTRAVASCULAR
Status: COMPLETED | OUTPATIENT
Start: 2024-08-29 | End: 2024-08-29

## 2024-08-29 RX ORDER — PROTAMINE SULFATE 10 MG/ML
INJECTION, SOLUTION INTRAVENOUS AS NEEDED
Status: DISCONTINUED | OUTPATIENT
Start: 2024-08-29 | End: 2024-08-29 | Stop reason: SURG

## 2024-08-29 RX ORDER — TRAMADOL HYDROCHLORIDE 50 MG/1
50 TABLET ORAL EVERY 6 HOURS PRN
Qty: 12 TABLET | Refills: 0 | Status: SHIPPED | OUTPATIENT
Start: 2024-08-29 | End: 2025-08-29

## 2024-08-29 RX ORDER — LIDOCAINE HYDROCHLORIDE 20 MG/ML
INJECTION, SOLUTION INFILTRATION; PERINEURAL AS NEEDED
Status: DISCONTINUED | OUTPATIENT
Start: 2024-08-29 | End: 2024-08-29 | Stop reason: SURG

## 2024-08-29 RX ORDER — FLUMAZENIL 0.1 MG/ML
0.2 INJECTION INTRAVENOUS AS NEEDED
Status: DISCONTINUED | OUTPATIENT
Start: 2024-08-29 | End: 2024-08-30 | Stop reason: HOSPADM

## 2024-08-29 RX ORDER — HEPARIN SODIUM 1000 [USP'U]/ML
INJECTION, SOLUTION INTRAVENOUS; SUBCUTANEOUS AS NEEDED
Status: DISCONTINUED | OUTPATIENT
Start: 2024-08-29 | End: 2024-08-29 | Stop reason: SURG

## 2024-08-29 RX ORDER — EPHEDRINE SULFATE 50 MG/ML
5 INJECTION, SOLUTION INTRAVENOUS ONCE AS NEEDED
Status: DISCONTINUED | OUTPATIENT
Start: 2024-08-29 | End: 2024-08-30 | Stop reason: HOSPADM

## 2024-08-29 RX ORDER — IPRATROPIUM BROMIDE AND ALBUTEROL SULFATE 2.5; .5 MG/3ML; MG/3ML
3 SOLUTION RESPIRATORY (INHALATION) ONCE AS NEEDED
Status: DISCONTINUED | OUTPATIENT
Start: 2024-08-29 | End: 2024-08-30 | Stop reason: HOSPADM

## 2024-08-29 RX ORDER — FAMOTIDINE 10 MG/ML
20 INJECTION, SOLUTION INTRAVENOUS ONCE
Status: COMPLETED | OUTPATIENT
Start: 2024-08-29 | End: 2024-08-29

## 2024-08-29 RX ORDER — HYDRALAZINE HYDROCHLORIDE 20 MG/ML
5 INJECTION INTRAMUSCULAR; INTRAVENOUS
Status: DISCONTINUED | OUTPATIENT
Start: 2024-08-29 | End: 2024-08-30 | Stop reason: HOSPADM

## 2024-08-29 RX ORDER — SODIUM CHLORIDE, SODIUM LACTATE, POTASSIUM CHLORIDE, CALCIUM CHLORIDE 600; 310; 30; 20 MG/100ML; MG/100ML; MG/100ML; MG/100ML
9 INJECTION, SOLUTION INTRAVENOUS CONTINUOUS
Status: DISCONTINUED | OUTPATIENT
Start: 2024-08-29 | End: 2024-08-30 | Stop reason: HOSPADM

## 2024-08-29 RX ORDER — SODIUM CHLORIDE 0.9 % (FLUSH) 0.9 %
3-10 SYRINGE (ML) INJECTION AS NEEDED
Status: DISCONTINUED | OUTPATIENT
Start: 2024-08-29 | End: 2024-08-29 | Stop reason: HOSPADM

## 2024-08-29 RX ORDER — MIDAZOLAM HYDROCHLORIDE 1 MG/ML
1 INJECTION INTRAMUSCULAR; INTRAVENOUS
Status: DISCONTINUED | OUTPATIENT
Start: 2024-08-29 | End: 2024-08-29 | Stop reason: HOSPADM

## 2024-08-29 RX ORDER — PROMETHAZINE HYDROCHLORIDE 25 MG/1
25 SUPPOSITORY RECTAL ONCE AS NEEDED
Status: DISCONTINUED | OUTPATIENT
Start: 2024-08-29 | End: 2024-08-30 | Stop reason: HOSPADM

## 2024-08-29 RX ORDER — TRAMADOL HYDROCHLORIDE 50 MG/1
50 TABLET ORAL ONCE AS NEEDED
Status: DISCONTINUED | OUTPATIENT
Start: 2024-08-29 | End: 2024-08-30 | Stop reason: HOSPADM

## 2024-08-29 RX ORDER — PROMETHAZINE HYDROCHLORIDE 25 MG/1
25 TABLET ORAL ONCE AS NEEDED
Status: DISCONTINUED | OUTPATIENT
Start: 2024-08-29 | End: 2024-08-30 | Stop reason: HOSPADM

## 2024-08-29 RX ORDER — LABETALOL HYDROCHLORIDE 5 MG/ML
5 INJECTION, SOLUTION INTRAVENOUS
Status: DISCONTINUED | OUTPATIENT
Start: 2024-08-29 | End: 2024-08-30 | Stop reason: HOSPADM

## 2024-08-29 RX ORDER — HYDROCODONE BITARTRATE AND ACETAMINOPHEN 5; 325 MG/1; MG/1
1 TABLET ORAL ONCE AS NEEDED
Status: DISCONTINUED | OUTPATIENT
Start: 2024-08-29 | End: 2024-08-30 | Stop reason: HOSPADM

## 2024-08-29 RX ORDER — FENTANYL CITRATE 50 UG/ML
50 INJECTION, SOLUTION INTRAMUSCULAR; INTRAVENOUS ONCE AS NEEDED
Status: DISCONTINUED | OUTPATIENT
Start: 2024-08-29 | End: 2024-08-29 | Stop reason: HOSPADM

## 2024-08-29 RX ORDER — HYDROMORPHONE HYDROCHLORIDE 1 MG/ML
0.5 INJECTION, SOLUTION INTRAMUSCULAR; INTRAVENOUS; SUBCUTANEOUS
Status: DISCONTINUED | OUTPATIENT
Start: 2024-08-29 | End: 2024-08-30 | Stop reason: HOSPADM

## 2024-08-29 RX ORDER — DIPHENHYDRAMINE HYDROCHLORIDE 50 MG/ML
12.5 INJECTION INTRAMUSCULAR; INTRAVENOUS
Status: DISCONTINUED | OUTPATIENT
Start: 2024-08-29 | End: 2024-08-30 | Stop reason: HOSPADM

## 2024-08-29 RX ORDER — CLOPIDOGREL BISULFATE 75 MG/1
150 TABLET ORAL DAILY
Status: DISCONTINUED | OUTPATIENT
Start: 2024-08-29 | End: 2024-08-30 | Stop reason: HOSPADM

## 2024-08-29 RX ORDER — PROPOFOL 10 MG/ML
INJECTION, EMULSION INTRAVENOUS AS NEEDED
Status: DISCONTINUED | OUTPATIENT
Start: 2024-08-29 | End: 2024-08-29 | Stop reason: SURG

## 2024-08-29 RX ORDER — FENTANYL CITRATE 50 UG/ML
50 INJECTION, SOLUTION INTRAMUSCULAR; INTRAVENOUS
Status: DISCONTINUED | OUTPATIENT
Start: 2024-08-29 | End: 2024-08-30 | Stop reason: HOSPADM

## 2024-08-29 RX ORDER — OXYCODONE AND ACETAMINOPHEN 7.5; 325 MG/1; MG/1
1 TABLET ORAL EVERY 4 HOURS PRN
Status: DISCONTINUED | OUTPATIENT
Start: 2024-08-29 | End: 2024-08-30 | Stop reason: HOSPADM

## 2024-08-29 RX ORDER — ONDANSETRON 2 MG/ML
4 INJECTION INTRAMUSCULAR; INTRAVENOUS ONCE AS NEEDED
Status: COMPLETED | OUTPATIENT
Start: 2024-08-29 | End: 2024-08-29

## 2024-08-29 RX ADMIN — PROPOFOL 200 MG: 10 INJECTION, EMULSION INTRAVENOUS at 10:26

## 2024-08-29 RX ADMIN — HEPARIN SODIUM 7500 UNITS: 1000 INJECTION, SOLUTION INTRAVENOUS; SUBCUTANEOUS at 11:10

## 2024-08-29 RX ADMIN — IOPAMIDOL 148 ML: 510 INJECTION, SOLUTION INTRAVASCULAR at 12:28

## 2024-08-29 RX ADMIN — FENTANYL CITRATE 50 MCG: 50 INJECTION, SOLUTION INTRAMUSCULAR; INTRAVENOUS at 12:19

## 2024-08-29 RX ADMIN — SODIUM CHLORIDE, POTASSIUM CHLORIDE, SODIUM LACTATE AND CALCIUM CHLORIDE 9 ML/HR: 600; 310; 30; 20 INJECTION, SOLUTION INTRAVENOUS at 09:41

## 2024-08-29 RX ADMIN — SODIUM CHLORIDE 3000 MG: 900 INJECTION INTRAVENOUS at 10:14

## 2024-08-29 RX ADMIN — PROPOFOL 30 MG: 10 INJECTION, EMULSION INTRAVENOUS at 10:32

## 2024-08-29 RX ADMIN — SODIUM CHLORIDE, POTASSIUM CHLORIDE, SODIUM LACTATE AND CALCIUM CHLORIDE: 600; 310; 30; 20 INJECTION, SOLUTION INTRAVENOUS at 12:30

## 2024-08-29 RX ADMIN — ONDANSETRON 4 MG: 2 INJECTION INTRAMUSCULAR; INTRAVENOUS at 13:05

## 2024-08-29 RX ADMIN — CLOPIDOGREL BISULFATE 150 MG: 75 TABLET, FILM COATED ORAL at 14:11

## 2024-08-29 RX ADMIN — Medication 3 ML: at 09:41

## 2024-08-29 RX ADMIN — PROTAMINE SULFATE 30 MG: 10 INJECTION, SOLUTION INTRAVENOUS at 12:22

## 2024-08-29 RX ADMIN — FAMOTIDINE 20 MG: 10 INJECTION INTRAVENOUS at 09:41

## 2024-08-29 RX ADMIN — HYDROMORPHONE HYDROCHLORIDE 0.5 MG: 1 INJECTION, SOLUTION INTRAMUSCULAR; INTRAVENOUS; SUBCUTANEOUS at 13:08

## 2024-08-29 RX ADMIN — LIDOCAINE HYDROCHLORIDE 60 MG: 20 INJECTION, SOLUTION INFILTRATION; PERINEURAL at 10:26

## 2024-08-29 RX ADMIN — PROPOFOL 30 MG: 10 INJECTION, EMULSION INTRAVENOUS at 12:18

## 2024-08-29 NOTE — SIGNIFICANT NOTE
08/29/24 0905   Patient Belongings:   Patient Belongings  Clothing;Electronic Devices;Other Valuables   Piercings Remaining No   Clothing Pants;Shirt;Footwear;Socks;Underpants   Clothing sent to In Locker   Patient Electronics Cell phone   Electronic Devices sent to In Locker   Other valuables Wallet   Other sent to In Locker   Patient Medications   Medications brought by patient? No     In locker #3 Main Pre/Post

## 2024-08-29 NOTE — ANESTHESIA PROCEDURE NOTES
Airway  Urgency: elective    Date/Time: 8/29/2024 10:27 AM  Airway not difficult    General Information and Staff    Patient location during procedure: OR  CRNA/CAA: Cee Pressley CRNA    Indications and Patient Condition  Indications for airway management: airway protection    Preoxygenated: yes  Mask difficulty assessment: 1 - vent by mask    Final Airway Details  Final airway type: supraglottic airway      Successful airway: classic  Size 5     Number of attempts at approach: 1  Assessment: lips, teeth, and gum same as pre-op    Additional Comments  Smooth IV induction. LMA inserted with ease. Cuff up. Lma secured. BEBS

## 2024-08-29 NOTE — OP NOTE
Operative Note  Date of Admission:  8/29/2024  OR Date: 8/29/2024    Pre-op Diagnosis:   Atherosclerosis of native artery of left lower extremity with ulceration of other part of foot [I70.245]; total occlusion left common iliac artery; traumatic ulcer plantar right great toe    Post-op Diagnosis:     Post-Op Diagnosis Codes:     * Atherosclerosis of native artery of left lower extremity with ulceration of other part of foot [I70.245]; total occlusion left common iliac artery; traumatic ulcer plantar right great toe    Procedure:   1) duplex ultrasound guided percutaneous access of the common femoral arteries bilaterally with abdominal aortogram; traversal of left common iliac artery total occlusion from right side using  sheath.  Angioplasty of left common iliac artery with 7 mm x 80 mm angioplasty balloon; covered stent placement of common iliac arteries bilaterally using the kissing stent technique (iCAST 9 mm x 59 mm on the right and 10 mm x 38 mm and 8 mm x 59 mm on the left with 1 cm of overlap)  2) Sharp excisional debridement right great toe of skin and subcutaneous tissue measuring 3 x 2 cm x 0.6 cm deep  Surgeon: Jes Chiu Jr, MD    Assistant:  JANUARY Miguel CSA and they provided critical assistance during the case including suctioning, exposure, retraction, and reduction of blood loss.    Anesthesia: General    Staff:   Circulator: Padmini Hallman RN; Nancy Velasquez RN  Scrub Person: Vanita Morales  Vascular Radiology Technician: Levy Briones    Estimated Blood Loss: Minimal    Specimens:   * No orders in the log *    Complications: None apparent    Findings: Total occlusion of the left common iliac artery essentially flush with the aorta to just above the origin of the hypogastric artery.  There was a moderate stenosis of the right common iliac artery from a medial shelf of plaque.  After angioplasty and stenting there was complete resolution of the stenosis on the right and the  occlusion on the left with brisk flow noted distally.  Right great toe wound did not extend to the level of the tendon or bone.    Indications:  As in preop diagnosis           Procedure: The patient was placed on the operating table in the supine position.  After satisfactory general anesthesia was achieved using LMA the patient was prepped from the umbilicus to the knees using ChloraPrep and draped in the usual sterile fashion.  Using duplex ultrasound the common femoral arteries were identified bilaterally and were accessed under direct vision.  A micropuncture sheath was placed over the guidewire.  On the right and 0.035 guidewire was advanced cephalad and a 7 Cuban sheath placed over the guidewire.  A pigtail catheter was placed over the guidewire and placed in the upper abdominal aorta.  An aortogram was performed using 20 cc of contrast at 10 cc/s.  This demonstrated mild irregularity of the infrarenal abdominal aorta but no significant stenosis appreciated.  The inferior mesenteric artery was of generous size without stenosis.  There is total occlusion of the left common iliac artery nearly flush with the aorta.  It reconstituted just above the level of the hypogastric artery with the external iliac artery and common femoral artery appearing normal.  On the right there was a shelf of plaque in the mid common iliac artery of about 60 to 70% medially.  Distal to this the common iliac artery and external iliac arteries as well as common femoral arteries were normal.  There was absence of the hypogastric artery on the right from occlusion.  7500 units of heparin was given intravenously.  An 0.035 guidewire was placed through the micropuncture sheath on the left followed by 7 Cuban sheath.  Using an angled Henry cross catheter I attempted to traverse the total occlusion but could not get through the cap proximally.  An Omni Flush catheter was then used from the right to try to get through the In the proximal  left common iliac artery occlusion but was unsuccessful.  Therefore a 7 Maori  it was placed over the guidewire and this was directed so that it was flush with the cap of thrombus.  Using this and a Henry cross catheter I was able to traverse the total occlusion.  The Henry cross catheter was passed into the external iliac artery.  A hand-held injection through this catheter confirmed this to be intraluminal.  A 7 mm x 80 mm angioplasty balloon was placed over the guidewire and the area of total occlusion of the left common iliac artery was dilated up to 14 robert for 2 minutes.  Following this the guidewire from the left was easily passed into the aortic lumen.  A glide cath was placed over the guidewire and the guidewire removed and a hand-held injection confirmed this to be intraluminal.  The guidewire was then advanced and the covered stents were placed.  On the left, the 8 mm x 59 mm iCAST stent was positioned so that the distal and was just above the origin of the hypogastric artery.  This was deployed.  A 10 mm x 38 mm Stent was placed proximal to this with at least 1 cm of overlap.  On the right a 9 mm x 59 mm iCAST stent was placed so that the leading edges of the stents were parallel.  Both were dilated up to 12 robert.  The pigtail catheter was placed over the guidewire on the right and an aortogram demonstrated complete resolution of the stenosis on the right and the occlusion on the left with brisk flow noted distally.  He had easily palpable femoral pulses noted following this.  The image intensifier was placed over the groin and both common femoral arteries were widely patent as were the profundofemoral arteries and the proximal superficial femoral arteries.  He was a candidate for closure devices and StarClose devices were utilized bilaterally with immediate hemostasis.  Additional manual pressure was held for 10 minutes.  20 mg protamine sulfate was given intravenously.    Next attention was  turned to the right foot which was prepped with Betadine and draped in the usual sterile fashion.  The large callus around the wound was then shaved using a 15 bladed knife.  The central wound with measurements as described above was then debrided sharply with the 10 blade knife, sharp excisional debridement with the above-mentioned measurements.  There was no exposed tendon or bone.  Hemostasis was achieved using electrocautery.  The wound was then packed with Betadine moistened gauze followed by more gauze and Kerlix and a 4 inch Ace wrap.  Sponge, needle, instrument counts were all reported as correct.  Patient was extubated transported recovery room in satisfactory condition.        Radiographic Findings:  1) as described above      Active Hospital Problems    Diagnosis  POA    **Atherosclerosis of native artery of extremity [I70.209]  Unknown      Resolved Hospital Problems   No resolved problems to display.      Jes Chiu Jr., MD     Date: 8/29/2024  Time: 12:51 EDT

## 2024-08-29 NOTE — DISCHARGE INSTRUCTIONS
Remove dressing in a.m. and may shower.   2.  No lifting more than 10 pounds for 3 days.   3.  May drive beginning tomorrow.   4.  Cleanse right great toe wound with Betadine and then place Band-Aid over this.  Do once daily.   5.  May resume normal activity on Monday, September 2.         [facility]  Aram Florence, Braulio Jewell Scherrer, [place], [place]  [address]  [place] [address]  [phone]      Discharge Instructions for Angiogram    Go home, rest and take it easy today.      You may experience some dizziness or memory loss from the anesthesia.  This may last for the next 24 hours.  Someone should plan on staying with you for the first 24 hours for your safety.    Do not make any important legal decisions or sign any legal papers for the next 24 hours.      Eat and drink lightly today.  Start off with liquids, jello, soup, crackers or other bland foods at first. You may advance your diet tomorrow as tolerated as long as you do not experience any nausea or vomiting.    You may resume routine medications including blood thinners. However, Glucophage should be not be started for 72 hours after the dye is given.      No lifting over 10 pounds and no strenuous activity for the next 2-3 days.    Try not to strain or bear down when your bowels move or when you empty your bladder.    Limit going up and down steps for 2 days.    No driving for the remainder of the day.  You may resume limited driving tomorrow if necessary.      You may shower tomorrow.  No bath or hot tubs for at least 2 days after the procedure.          Leave the pressure dressing on until tomorrow morning.  You may then take this off, as well as the small see through dressing with gauze tomorrow.  If it doesn’t come off easily, do not pull this off.  If it is stuck, shower and let the warm water loosen it before removal.       Check your groin dressing regularly for bleeding through the dressing (under the  pressure dressing).  A small amount of blood contained by the gauze is normal; the whole dressing should not be filled with blood or leaking out under the sides.     If you experience bleeding through the gauze/clear sticky dressing, lay flat and have someone apply direct pressure for 15 minutes.  If bleeding has stopped after this, you may put a clean gauze and tape over the area.  Continue to lie flat for 1-2 hours.  If bleeding continues after 15 minutes of pressure, call us at the number listed above.  There is an MD available after hours.      If you experience heavy bleeding or large swelling, continue to hold firm pressure and              call 911.  Do not call the MD on call.      If you experience pain or discomfort you may take Tylenol or Ibuprofen, whichever you normally use for minor discomfort, unless otherwise instructed.        If the MD gives you a prescription for narcotic pain pills (Tylenol #3, Vicodin, Hydrocodone, Oxycodone or Percocet), you cannot drive a vehicle or operate machinery while taking these.     Severe pain is not expected after this procedure.  If you experience severe pain, please call our office at [phone].     Remember to contact our office for any of the following:    Fever > 101 degrees  Severe pain that cannot be controlled by taking your pain pills  Severe nausea or vomiting   Significant bleeding of your incisions  Drainage that has a bad smell or is yellow or green in appearance  Any other questions or concerns

## 2024-08-29 NOTE — ANESTHESIA POSTPROCEDURE EVALUATION
Patient: Marbin Hawkins    Procedure Summary       Date: 08/29/24 Room / Location: [facility] OR [address] [place] [address] HYBRID OR    Anesthesia Start: 1018 Anesthesia Stop: 1253    Procedures:       BILATERAL LOWER EXTREMITY ARTERIOGRAM WITH BILATERAL ILIAC ARTERY ANGIOPLASTY AND STENT placement; right great toe debridement (Bilateral: Thigh)      right great toe debridement (Right: Toe First) Diagnosis:       Atherosclerosis of native artery of left lower extremity with ulceration of other part of foot      (Atherosclerosis of native artery of left lower extremity with ulceration of other part of foot [I70.245])    Surgeons: Jes Chiu Jr., MD Provider: Amador Gonsalez MD    Anesthesia Type: general ASA Status: 3            Anesthesia Type: general    Vitals  Vitals Value Taken Time   /79 08/29/24 1445   Temp 36.4 °C (97.5 °F) 08/29/24 1250   Pulse 68 08/29/24 1453   Resp 15 08/29/24 1445   SpO2 100 % 08/29/24 1453   Vitals shown include unfiled device data.        Post Anesthesia Care and Evaluation    Patient location during evaluation: PHASE II  Patient participation: complete - patient participated  Level of consciousness: awake and alert  Pain management: adequate    Airway patency: patent  Anesthetic complications: No anesthetic complications  PONV Status: none  Cardiovascular status: acceptable and hemodynamically stable  Respiratory status: acceptable, nonlabored ventilation and spontaneous ventilation  Hydration status: acceptable

## 2024-08-29 NOTE — ANESTHESIA PREPROCEDURE EVALUATION
Anesthesia Evaluation     Patient summary reviewed and Nursing notes reviewed   NPO Solid Status: > 8 hours  NPO Liquid Status: > 2 hours           Airway   Mallampati: II  TM distance: <3 FB  Neck ROM: full  Possible difficult intubation  Dental      Comment: Very expensive implants both upper and lower (zirconium)      Pulmonary - normal exam    breath sounds clear to auscultation  (+) a smoker Current,  Cardiovascular - normal exam    ECG reviewed  Rhythm: regular  Rate: normal    (+) PVD, hyperlipidemia      Neuro/Psych  GI/Hepatic/Renal/Endo    (+) diabetes mellitus type 2    Musculoskeletal     Abdominal  - normal exam   Substance History      OB/GYN          Other                      Anesthesia Plan    ASA 3     general     (Possible LMA/may want CMAC for intubation if GETA due to expensive dental implants)  intravenous induction     Anesthetic plan, risks, benefits, and alternatives have been provided, discussed and informed consent has been obtained with: patient.      CODE STATUS:

## 2024-08-30 NOTE — OUTREACH NOTE
Medical Week 1 Survey      Flowsheet Row Responses   [facility] patient discharged from? [place]   Does the patient have one of the following disease processes/diagnoses(primary or secondary)? Other   Week 1 attempt successful? No   Unsuccessful attempts Attempt 1   Revoke Readmitted            FACUNDO PADILLA - Registered Nurse

## 2024-09-18 ENCOUNTER — OFFICE VISIT (OUTPATIENT)
Age: 59
End: 2024-09-18
Payer: MEDICAID

## 2024-09-18 VITALS
HEIGHT: 78 IN | SYSTOLIC BLOOD PRESSURE: 117 MMHG | HEART RATE: 97 BPM | DIASTOLIC BLOOD PRESSURE: 78 MMHG | WEIGHT: 269 LBS | BODY MASS INDEX: 31.12 KG/M2

## 2024-09-18 DIAGNOSIS — I70.245 ATHEROSCLEROSIS OF NATIVE ARTERY OF LEFT LOWER EXTREMITY WITH ULCERATION OF OTHER PART OF FOOT: Primary | ICD-10-CM

## 2024-09-18 DIAGNOSIS — I87.2 VENOUS (PERIPHERAL) INSUFFICIENCY: ICD-10-CM

## 2024-09-18 DIAGNOSIS — I83.891 VARICOSE VEINS OF RIGHT LOWER EXTREMITY WITH OTHER COMPLICATIONS: ICD-10-CM

## 2024-09-18 DIAGNOSIS — E11.9 TYPE 2 DIABETES MELLITUS WITHOUT COMPLICATION, WITHOUT LONG-TERM CURRENT USE OF INSULIN: ICD-10-CM

## 2024-09-18 DIAGNOSIS — I74.5 ILIAC ARTERY OCCLUSION, LEFT: ICD-10-CM

## 2024-09-30 ENCOUNTER — TELEPHONE (OUTPATIENT)
Age: 59
End: 2024-09-30
Payer: MEDICAID

## 2024-09-30 NOTE — TELEPHONE ENCOUNTER
Spoke with spouse. Pt s/p arteriogram with toe debridement on 8/29. Patient seen over the weekend at [facility] and was prescribed abx and mupirocin, unable to tell me which antibiotic he was prescribed. Wounds are attached to this message. Appt made with ADIEL tomorrow to see for wound check.       Patient Entered Attachment - [id number] (09/30/2024)   Patient Entered Attachment - [id number] (09/30/2024)

## 2024-09-30 NOTE — TELEPHONE ENCOUNTER
Patient had a right great toe debridement on 08.29.24. Patients spouse stated that over the weekend, the right foot was red and the wound cite was bleeding so they presented to an urgent care at [facility]. [facility] gave the patient antibiotics. Spouse stated that the redness in the foot is still slightly there but has gotten much better. She stated that when she goes to change the dressing there is still blood. She would like to talk to someone about this issue.

## 2024-09-30 NOTE — PROGRESS NOTES
Chief Complaint  Atherosclerosis of native artery of left lower extremity wi    Ludivina Hawkins presents to [facility] VASCULAR SURGERY  HPI   Marbin Hawkins is a 59 y.o. male that has been followed in our office Dr. Chiu for peripheral arterial disease.  On 8/29/2024, he had an arteriogram angioplasty left common iliac artery and bilateral common iliac kissing covered stent placements as well as a excisional debridement of her right great toe.  We received a phone call from his spouse reporting that over the weekend, his right foot became red and his wound site was bleeding.  He was seen in urgent care and started on antibiotics.  Today, his wife reports that the redness that was standing up his foot has improved.  There is no purulent drainage from his toe.    Review of Systems   Constitutional:  Negative for fever.   Eyes:  Negative for visual disturbance.   Cardiovascular:  Negative for leg swelling.   Gastrointestinal:  Negative for abdominal pain.   Musculoskeletal:  Negative for back pain.   Skin:  Negative for color change, pallor and wound.   Neurological:  Negative for dizziness, facial asymmetry, speech difficulty and weakness.        Marbin Hawkins  reports that he has quit smoking. His smoking use included cigarettes. He has a 105 pack-year smoking history. He has never used smokeless tobacco..        Objective   Vital Signs:  Vitals:    10/01/24 0900   BP: 109/73   Pulse: 89      Body mass index is 29.41 kg/m².           Physical Exam  Vitals reviewed.   Constitutional:       Appearance: Normal appearance.   HENT:      Head: Normocephalic.   Cardiovascular:      Rate and Rhythm: Normal rate and regular rhythm.      Pulses:           Dorsalis pedis pulses are 3+ on the right side and 3+ on the left side.        Posterior tibial pulses are 3+ on the right side and 3+ on the left side.   Pulmonary:      Effort: Pulmonary effort is normal.   Skin:     General: Skin is warm.    Neurological:      General: No focal deficit present.      Mental Status: He is alert and oriented to person, place, and time.   Psychiatric:         Mood and Affect: Mood normal.          Result Review :    Doppler Arterial Multi Level Lower Extremity - Bilateral CAR (08/25/2024 14:10)   MRI Foot Right With & Without Contrast (08/25/2024 10:40)                      Assessment and Plan     Diagnoses and all orders for this visit:    1. Open wound of right great toe, subsequent encounter (Primary)  -     XR Foot 2 View Right; Future    Other orders  -     ciprofloxacin (Cipro) 500 MG tablet; Take 1 tablet by mouth 2 (Two) Times a Day.  Dispense: 20 tablet; Refill: 0          Patient presents today for follow up of his peripheral arterial disease.  He is status postdebridement of a right foot wound.  He recently went to urgent care due to increased redness.  This has improved over the last several days.  He was put on either Bactrim or Keflex, as both of these are in his chart and his wife does not know which one he has been taking.  I ordered a stat foot x-ray to be done and the results show that he has osteomyelitis to his right great toe.  I have recommended hospital admission for IV antibiotics, an MRI, and possible right great toe amputation.       Follow Up     Return in about 1 week (around 10/8/2024) [place] [facility] [place]  [place] was given instructions and counseling regarding his condition or for health maintenance advice. Please see specific information pulled into the AVS if appropriate.     ADIEL Cohn

## 2024-10-01 ENCOUNTER — HOSPITAL ENCOUNTER (INPATIENT)
Facility: HOSPITAL | Age: 59
LOS: 6 days | Discharge: HOME | End: 2024-10-07
Attending: INTERNAL MEDICINE | Admitting: INTERNAL MEDICINE
Payer: MEDICAID

## 2024-10-01 ENCOUNTER — HOSPITAL ENCOUNTER (OUTPATIENT)
Dept: GENERAL RADIOLOGY | Facility: HOSPITAL | Age: 59
Discharge: HOME | End: 2024-10-01
Admitting: NURSE PRACTITIONER
Payer: MEDICAID

## 2024-10-01 ENCOUNTER — OFFICE VISIT (OUTPATIENT)
Age: 59
End: 2024-10-01

## 2024-10-01 VITALS
WEIGHT: 267.7 LBS | DIASTOLIC BLOOD PRESSURE: 73 MMHG | BODY MASS INDEX: 30.97 KG/M2 | SYSTOLIC BLOOD PRESSURE: 109 MMHG | HEIGHT: 78 IN | HEART RATE: 89 BPM

## 2024-10-01 DIAGNOSIS — S91.101D OPEN WOUND OF RIGHT GREAT TOE, SUBSEQUENT ENCOUNTER: Primary | ICD-10-CM

## 2024-10-01 DIAGNOSIS — S91.101D OPEN WOUND OF RIGHT GREAT TOE, SUBSEQUENT ENCOUNTER: ICD-10-CM

## 2024-10-01 DIAGNOSIS — E11.9 TYPE 2 DIABETES MELLITUS WITHOUT COMPLICATION, WITHOUT LONG-TERM CURRENT USE OF INSULIN: ICD-10-CM

## 2024-10-01 DIAGNOSIS — I73.9 PERIPHERAL VASCULAR DISEASE: ICD-10-CM

## 2024-10-01 DIAGNOSIS — M86.9 OSTEOMYELITIS OF GREAT TOE OF RIGHT FOOT: Primary | ICD-10-CM

## 2024-10-01 LAB
ALBUMIN SERPL-MCNC: 3.6 G/DL (ref 3.5–5.2)
ALBUMIN/GLOB SERPL: 1 G/DL
ALP SERPL-CCNC: 78 U/L (ref 39–117)
ALT SERPL W P-5'-P-CCNC: 11 U/L (ref 1–41)
ANION GAP SERPL CALCULATED.3IONS-SCNC: 11.6 MMOL/L (ref 5–15)
AST SERPL-CCNC: 14 U/L (ref 1–40)
BASOPHILS # BLD AUTO: 0.04 10*3/MM3 (ref 0–0.2)
BASOPHILS NFR BLD AUTO: 0.6 % (ref 0–1.5)
BILIRUB SERPL-MCNC: 0.4 MG/DL (ref 0–1.2)
BUN SERPL-MCNC: 14 MG/DL (ref 6–20)
BUN/CREAT SERPL: 17.7 (ref 7–25)
CALCIUM SPEC-SCNC: 8.6 MG/DL (ref 8.6–10.5)
CHLORIDE SERPL-SCNC: 103 MMOL/L (ref 98–107)
CO2 SERPL-SCNC: 24.4 MMOL/L (ref 22–29)
CREAT SERPL-MCNC: 0.79 MG/DL (ref 0.76–1.27)
D-LACTATE SERPL-SCNC: 1.1 MMOL/L (ref 0.5–2)
DEPRECATED RDW RBC AUTO: 36.6 FL (ref 37–54)
EGFRCR SERPLBLD CKD-EPI 2021: 103 ML/MIN/1.73
EOSINOPHIL # BLD AUTO: 0.44 10*3/MM3 (ref 0–0.4)
EOSINOPHIL NFR BLD AUTO: 6.5 % (ref 0.3–6.2)
ERYTHROCYTE [DISTWIDTH] IN BLOOD BY AUTOMATED COUNT: 11.6 % (ref 12.3–15.4)
GLOBULIN UR ELPH-MCNC: 3.6 GM/DL
GLUCOSE BLDC GLUCOMTR-MCNC: 119 MG/DL (ref 70–130)
GLUCOSE SERPL-MCNC: 102 MG/DL (ref 65–99)
HCT VFR BLD AUTO: 35.8 % (ref 37.5–51)
HGB BLD-MCNC: 12.3 G/DL (ref 13–17.7)
IMM GRANULOCYTES # BLD AUTO: 0.01 10*3/MM3 (ref 0–0.05)
IMM GRANULOCYTES NFR BLD AUTO: 0.1 % (ref 0–0.5)
LYMPHOCYTES # BLD AUTO: 2.64 10*3/MM3 (ref 0.7–3.1)
LYMPHOCYTES NFR BLD AUTO: 38.8 % (ref 19.6–45.3)
MCH RBC QN AUTO: 30.5 PG (ref 26.6–33)
MCHC RBC AUTO-ENTMCNC: 34.4 G/DL (ref 31.5–35.7)
MCV RBC AUTO: 88.8 FL (ref 79–97)
MONOCYTES # BLD AUTO: 0.65 10*3/MM3 (ref 0.1–0.9)
MONOCYTES NFR BLD AUTO: 9.6 % (ref 5–12)
MRSA DNA SPEC QL NAA+PROBE: NORMAL
NEUTROPHILS NFR BLD AUTO: 3.02 10*3/MM3 (ref 1.7–7)
NEUTROPHILS NFR BLD AUTO: 44.4 % (ref 42.7–76)
NRBC BLD AUTO-RTO: 0 /100 WBC (ref 0–0.2)
PLATELET # BLD AUTO: 246 10*3/MM3 (ref 140–450)
PMV BLD AUTO: 9 FL (ref 6–12)
POTASSIUM SERPL-SCNC: 4 MMOL/L (ref 3.5–5.2)
PROCALCITONIN SERPL-MCNC: 0.03 NG/ML (ref 0–0.25)
PROT SERPL-MCNC: 7.2 G/DL (ref 6–8.5)
RBC # BLD AUTO: 4.03 10*6/MM3 (ref 4.14–5.8)
SODIUM SERPL-SCNC: 139 MMOL/L (ref 136–145)
WBC NRBC COR # BLD AUTO: 6.8 10*3/MM3 (ref 3.4–10.8)

## 2024-10-01 PROCEDURE — 85025 COMPLETE CBC W/AUTO DIFF WBC: CPT | Performed by: INTERNAL MEDICINE

## 2024-10-01 PROCEDURE — 84145 PROCALCITONIN (PCT): CPT | Performed by: INTERNAL MEDICINE

## 2024-10-01 PROCEDURE — 87641 MR-STAPH DNA AMP PROBE: CPT | Performed by: INTERNAL MEDICINE

## 2024-10-01 PROCEDURE — 25010000002 VANCOMYCIN 10 G RECONSTITUTED SOLUTION: Performed by: INTERNAL MEDICINE

## 2024-10-01 PROCEDURE — 73620 X-RAY EXAM OF FOOT: CPT

## 2024-10-01 PROCEDURE — 83605 ASSAY OF LACTIC ACID: CPT | Performed by: INTERNAL MEDICINE

## 2024-10-01 PROCEDURE — 25010000002 CEFEPIME PER 500 MG: Performed by: INTERNAL MEDICINE

## 2024-10-01 PROCEDURE — 82948 REAGENT STRIP/BLOOD GLUCOSE: CPT

## 2024-10-01 PROCEDURE — 80053 COMPREHEN METABOLIC PANEL: CPT | Performed by: INTERNAL MEDICINE

## 2024-10-01 PROCEDURE — 99214 OFFICE O/P EST MOD 30 MIN: CPT | Performed by: NURSE PRACTITIONER

## 2024-10-01 PROCEDURE — 25810000003 SODIUM CHLORIDE 0.9 % SOLUTION: Performed by: INTERNAL MEDICINE

## 2024-10-01 PROCEDURE — 87040 BLOOD CULTURE FOR BACTERIA: CPT | Performed by: INTERNAL MEDICINE

## 2024-10-01 RX ORDER — INSULIN LISPRO 100 [IU]/ML
2-7 INJECTION, SOLUTION INTRAVENOUS; SUBCUTANEOUS
Status: DISCONTINUED | OUTPATIENT
Start: 2024-10-01 | End: 2024-10-07 | Stop reason: HOSPADM

## 2024-10-01 RX ORDER — ACETAMINOPHEN 325 MG/1
650 TABLET ORAL EVERY 4 HOURS PRN
Status: DISCONTINUED | OUTPATIENT
Start: 2024-10-01 | End: 2024-10-07 | Stop reason: HOSPADM

## 2024-10-01 RX ORDER — CIPROFLOXACIN 500 MG/1
500 TABLET, FILM COATED ORAL 2 TIMES DAILY
Qty: 20 TABLET | Refills: 0 | Status: SHIPPED | OUTPATIENT
Start: 2024-10-01 | End: 2024-10-07 | Stop reason: HOSPADM

## 2024-10-01 RX ORDER — SULFAMETHOXAZOLE/TRIMETHOPRIM 800-160 MG
1 TABLET ORAL 2 TIMES DAILY
COMMUNITY
Start: 2024-09-28 | End: 2024-10-01 | Stop reason: ALTCHOICE

## 2024-10-01 RX ORDER — SODIUM CHLORIDE 9 MG/ML
100 INJECTION, SOLUTION INTRAVENOUS CONTINUOUS
Status: DISCONTINUED | OUTPATIENT
Start: 2024-10-01 | End: 2024-10-02

## 2024-10-01 RX ORDER — SODIUM CHLORIDE 0.9 % (FLUSH) 0.9 %
10 SYRINGE (ML) INJECTION AS NEEDED
Status: DISCONTINUED | OUTPATIENT
Start: 2024-10-01 | End: 2024-10-07 | Stop reason: HOSPADM

## 2024-10-01 RX ORDER — SODIUM CHLORIDE 9 MG/ML
40 INJECTION, SOLUTION INTRAVENOUS AS NEEDED
Status: DISCONTINUED | OUTPATIENT
Start: 2024-10-01 | End: 2024-10-07 | Stop reason: HOSPADM

## 2024-10-01 RX ORDER — SODIUM CHLORIDE 0.9 % (FLUSH) 0.9 %
10 SYRINGE (ML) INJECTION EVERY 12 HOURS SCHEDULED
Status: DISCONTINUED | OUTPATIENT
Start: 2024-10-01 | End: 2024-10-07 | Stop reason: HOSPADM

## 2024-10-01 RX ORDER — ONDANSETRON 2 MG/ML
4 INJECTION INTRAMUSCULAR; INTRAVENOUS EVERY 6 HOURS PRN
Status: DISCONTINUED | OUTPATIENT
Start: 2024-10-01 | End: 2024-10-07 | Stop reason: HOSPADM

## 2024-10-01 RX ORDER — ACETAMINOPHEN 650 MG/1
650 SUPPOSITORY RECTAL EVERY 4 HOURS PRN
Status: DISCONTINUED | OUTPATIENT
Start: 2024-10-01 | End: 2024-10-07 | Stop reason: HOSPADM

## 2024-10-01 RX ORDER — ACETAMINOPHEN 160 MG/5ML
650 SOLUTION ORAL EVERY 4 HOURS PRN
Status: DISCONTINUED | OUTPATIENT
Start: 2024-10-01 | End: 2024-10-07 | Stop reason: HOSPADM

## 2024-10-01 RX ORDER — GLIPIZIDE 5 MG/1
5 TABLET ORAL
Status: DISCONTINUED | OUTPATIENT
Start: 2024-10-01 | End: 2024-10-07 | Stop reason: HOSPADM

## 2024-10-01 RX ORDER — DEXTROSE MONOHYDRATE 25 G/50ML
25 INJECTION, SOLUTION INTRAVENOUS
Status: DISCONTINUED | OUTPATIENT
Start: 2024-10-01 | End: 2024-10-07 | Stop reason: HOSPADM

## 2024-10-01 RX ORDER — NICOTINE POLACRILEX 4 MG
15 LOZENGE BUCCAL
Status: DISCONTINUED | OUTPATIENT
Start: 2024-10-01 | End: 2024-10-07 | Stop reason: HOSPADM

## 2024-10-01 RX ORDER — NITROGLYCERIN 0.4 MG/1
0.4 TABLET SUBLINGUAL
Status: DISCONTINUED | OUTPATIENT
Start: 2024-10-01 | End: 2024-10-07 | Stop reason: HOSPADM

## 2024-10-01 RX ORDER — IBUPROFEN 600 MG/1
1 TABLET ORAL
Status: DISCONTINUED | OUTPATIENT
Start: 2024-10-01 | End: 2024-10-07 | Stop reason: HOSPADM

## 2024-10-01 RX ADMIN — Medication 10 ML: at 20:45

## 2024-10-01 RX ADMIN — SODIUM CHLORIDE 100 ML/HR: 9 INJECTION, SOLUTION INTRAVENOUS at 20:45

## 2024-10-01 RX ADMIN — METFORMIN HYDROCHLORIDE 500 MG: 500 TABLET, FILM COATED ORAL at 21:53

## 2024-10-01 RX ADMIN — CEFEPIME 2000 MG: 2 INJECTION, POWDER, FOR SOLUTION INTRAVENOUS at 20:48

## 2024-10-01 RX ADMIN — GLIPIZIDE 5 MG: 5 TABLET ORAL at 21:53

## 2024-10-01 RX ADMIN — VANCOMYCIN HYDROCHLORIDE 2500 MG: 10 INJECTION, POWDER, LYOPHILIZED, FOR SOLUTION INTRAVENOUS at 23:10

## 2024-10-01 NOTE — H&P
Internal medicine history and physical  INTERNAL MEDICINE   [facility]       Patient Identification:  Name: Marbin Hawkins  Age: 59 y.o.  Sex: male  :  1965  MRN: [id number]                   Primary Care Physician: Joyce Restrepo APRN                               Date of admission:10/1/2024    Chief Complaint: Sent from vascular surgery office for further management of right great toe wound and acute osteomyelitis of the proximal and distal phalanx of the right great toe    History of Present Illness:   Patient is a 59-year-old male with history of diabetes, peripheral vascular disease who recently underwent arteriogram and angioplasty of the left common iliac artery and bilateral common iliac stent placement as well as debridement of the diabetic ischemic wound of the right great toe on 2024.  Patient has been providing local care of his great toe wound since and thought that he was getting better until this past weekend his right foot got swollen with redness involving his ankle and lower extremity.  Patient went to immediate care and was started on antibiotic therapy with improvement.  Patient today went to vascular surgery service for the follow-up and was noted to have open wound of the right great toe for which x-ray was performed which showed evidence of osteomyelitis involving the proximal distal phalanx of the right great toe.  Patient does not have any systemic fever and chills.  Because of x-ray finding patient is admitted directly for further care.  Patient does remember what antibiotic he was taking.  He was told that he is going get MRI evaluation of his right foot.      Past Medical History:  Past Medical History:   Diagnosis Date    Diabetes mellitus      Past Surgical History:  Past Surgical History:   Procedure Laterality Date    APPENDECTOMY      ARTERIOGRAM LOWER EXTREMITY WITH ANGIOPLASTY STENT Bilateral 2024    Procedure: BILATERAL LOWER EXTREMITY  ARTERIOGRAM WITH BILATERAL ILIAC ARTERY ANGIOPLASTY AND STENT placement; right great toe debridement;  Surgeon: Jes Chiu Jr., MD;  Location: [facility] OR;  Service: Vascular;  Laterality: Bilateral;    CARPAL TUNNEL RELEASE Left     INCISION AND DRAINAGE LEG Right 8/29/2024    Procedure: right great toe debridement;  Surgeon: Jes Chiu Jr., MD;  Location: [facility] OR;  Service: Vascular;  Laterality: Right;      Home Meds:  Medications Prior to Admission   Medication Sig Dispense Refill Last Dose    ciprofloxacin (Cipro) 500 MG tablet Take 1 tablet by mouth 2 (Two) Times a Day. 20 tablet 0 9/30/2024    clopidogrel (Plavix) 75 MG tablet Take 1 tablet by mouth Daily. 30 tablet 2 9/30/2024    glipizide (GLUCOTROL) 5 MG tablet Take 1 tablet by mouth 2 (Two) Times a Day Before Meals.   9/30/2024    metFORMIN (GLUCOPHAGE) 500 MG tablet Take 1 tablet by mouth 2 (Two) Times a Day With Meals for 90 days. 60 tablet 2 9/30/2024    naproxen (Naprosyn) 500 MG tablet Take 1 tablet by mouth 2 (Two) Times a Day With Meals. 60 tablet 3     traMADol (Ultram) 50 MG tablet Take 1 tablet by mouth Every 6 (Six) Hours As Needed for Moderate Pain. 12 tablet 0      Current Meds:   No current facility-administered medications for this encounter.  Allergies:  No Known Allergies  Social History:   Social History     Tobacco Use    Smoking status: Former     Current packs/day: 3.00     Average packs/day: 3.0 packs/day for 35.0 years (105.0 ttl pk-yrs)     Types: Cigarettes    Smokeless tobacco: Never   Substance Use Topics    Alcohol use: Never      Family History:  Family History   Problem Relation Age of Onset    Alzheimer's disease Mother     Heart attack Father     Uterine cancer Sister     Lung cancer Brother     No Known Problems Brother     No Known Problems Brother     Malig Hyperthermia Neg Hx           Review of Systems  See history of present illness and past medical history.  As described in the  "history of presenting illness      Vitals:   /75 (BP Location: Right arm, Patient Position: Lying)   Pulse 76   Temp 98 °F (36.7 °C) (Oral)   Resp 18   Ht 203.2 cm (80\")   Wt 120 kg (265 lb 6.9 oz)   SpO2 98%   BMI 29.16 kg/m²   I/O:   Intake/Output Summary (Last 24 hours) at 10/1/2024 1847  Last data filed at 10/1/2024 1819  Gross per 24 hour   Intake 360 ml   Output --   Net 360 ml     Exam:  Patient is examined using the personal protective equipment as per guidelines from infection control for this particular patient as enacted.  Hand washing was performed before and after patient interaction.  General Appearance:    Alert, cooperative, no distress, appears stated age   Head:    Normocephalic, without obvious abnormality, atraumatic   Eyes:    PERRL, conjunctiva/corneas clear, EOM's intact, both eyes   Ears:    Normal external ear canals, both ears   Nose:   Nares normal, septum midline, mucosa normal, no drainage    or sinus tenderness   Throat:   Lips, tongue, gums normal; oral mucosa pink and moist   Neck:   Supple, symmetrical, trachea midline, no adenopathy;     thyroid:  no enlargement/tenderness/nodules; no carotid    bruit or JVD   Back:     Symmetric, no curvature, ROM normal, no CVA tenderness   Lungs:     Clear to auscultation bilaterally, respirations unlabored   Chest Wall:    No tenderness or deformity    Heart:    Regular rate and rhythm, S1 and S2 normal, no murmur, rub   or gallop   Abdomen:     Soft, non-tender, bowel sounds active all four quadrants,     no masses, no hepatomegaly, no splenomegaly   Extremities:            Pulses: Warm right foot with good capillary refill   Skin: No rash noted   Neurologic: Alert and oriented x 3       Data Review:      I reviewed the patient's new clinical results.  Labs pending  No radiology results for the last 30 days.  XR FOOT 2 VW RIGHT-      Clinical: Open wound right great toe, rule out osteomyelitis      FINDINGS: Great toe soft tissue " swelling, no radiopaque foreign body or   soft tissue gas is demonstrated. Medullary lucency and cortical bone   destruction of the proximal and distal phalanx consistent with   osteomyelitis. Medullary cyst demonstrated within the proximal phalanx   of the small toe. No acute osseous abnormality seen. There is an   inferior calcaneal spur with mild midfoot joint degeneration.      CONCLUSION: The appearance of the proximal and distal phalanx of the   great toe consistent with osteomyelitis. There is great toe soft tissue   swelling seen.   Assessment:  Active Hospital Problems    Diagnosis  POA    Osteomyelitis of great toe of right foot [M86.9]  Unknown    Peripheral vascular disease [I73.9]  Unknown    Atherosclerosis of native artery of extremity [I70.209]  Yes    Diabetic foot ulcer [E11.621, L97.509]  Yes    Type 2 diabetes mellitus [E11.9]  Yes       Medical decision making/care plan: See admitting orders  Right great toe ischemic/diabetic wound with recent cellulitis improved on outpatient oral antibiotic therapy now with x-ray evidence of right great toe osteomyelitis-plan is to admit the patient get blood cultures and wound culture and empirically start him on cefepime and vancomycin.  Check MRSA screen.  Vascular surgery service is consulted and MRI is ordered.  Get basic set of labs such as CBC and CMP.  Peripheral vascular disease and atherosclerosis of the native artery of the extremity status post arteriogram and revascularization and stenting on 8/29/2024-continue with his current regimen and consult vascular surgery service.  Given the recent stent placement we will continue with his antiplatelet therapy until evaluated by vascular surgery service.  Type 2 diabetes-hold his metformin provide him with Accu-Cheks and sliding scale coverage check hemoglobin A1c and watch for hypoglycemia.    Canelo Canseco MD   10/1/2024  18:47 EDT    Parts of this note may be an electronic transcription/translation of  spoken language to printed text using the Dragon dictation system.

## 2024-10-01 NOTE — PLAN OF CARE
Goal Outcome Evaluation:      A/ox4, up ad kisha, admitted today 10-01-24, waiting for orders to be placed.

## 2024-10-02 LAB
ALBUMIN SERPL-MCNC: 3.5 G/DL (ref 3.5–5.2)
ALBUMIN/GLOB SERPL: 1 G/DL
ALP SERPL-CCNC: 71 U/L (ref 39–117)
ALT SERPL W P-5'-P-CCNC: 12 U/L (ref 1–41)
ANION GAP SERPL CALCULATED.3IONS-SCNC: 10.8 MMOL/L (ref 5–15)
AST SERPL-CCNC: 12 U/L (ref 1–40)
BASOPHILS # BLD AUTO: 0.03 10*3/MM3 (ref 0–0.2)
BASOPHILS NFR BLD AUTO: 0.5 % (ref 0–1.5)
BILIRUB SERPL-MCNC: 0.4 MG/DL (ref 0–1.2)
BUN SERPL-MCNC: 12 MG/DL (ref 6–20)
BUN/CREAT SERPL: 20.7 (ref 7–25)
CALCIUM SPEC-SCNC: 8.9 MG/DL (ref 8.6–10.5)
CHLORIDE SERPL-SCNC: 104 MMOL/L (ref 98–107)
CO2 SERPL-SCNC: 21.2 MMOL/L (ref 22–29)
CREAT SERPL-MCNC: 0.58 MG/DL (ref 0.76–1.27)
DEPRECATED RDW RBC AUTO: 38.3 FL (ref 37–54)
EGFRCR SERPLBLD CKD-EPI 2021: 113 ML/MIN/1.73
EOSINOPHIL # BLD AUTO: 0.47 10*3/MM3 (ref 0–0.4)
EOSINOPHIL NFR BLD AUTO: 7.7 % (ref 0.3–6.2)
ERYTHROCYTE [DISTWIDTH] IN BLOOD BY AUTOMATED COUNT: 11.5 % (ref 12.3–15.4)
GLOBULIN UR ELPH-MCNC: 3.5 GM/DL
GLUCOSE BLDC GLUCOMTR-MCNC: 109 MG/DL (ref 70–130)
GLUCOSE BLDC GLUCOMTR-MCNC: 125 MG/DL (ref 70–130)
GLUCOSE BLDC GLUCOMTR-MCNC: 156 MG/DL (ref 70–130)
GLUCOSE BLDC GLUCOMTR-MCNC: 173 MG/DL (ref 70–130)
GLUCOSE SERPL-MCNC: 85 MG/DL (ref 65–99)
HBA1C MFR BLD: 5.9 % (ref 4.8–5.6)
HCT VFR BLD AUTO: 38.9 % (ref 37.5–51)
HGB BLD-MCNC: 12.6 G/DL (ref 13–17.7)
IMM GRANULOCYTES # BLD AUTO: 0.02 10*3/MM3 (ref 0–0.05)
IMM GRANULOCYTES NFR BLD AUTO: 0.3 % (ref 0–0.5)
LYMPHOCYTES # BLD AUTO: 2.8 10*3/MM3 (ref 0.7–3.1)
LYMPHOCYTES NFR BLD AUTO: 45.7 % (ref 19.6–45.3)
MCH RBC QN AUTO: 29.4 PG (ref 26.6–33)
MCHC RBC AUTO-ENTMCNC: 32.4 G/DL (ref 31.5–35.7)
MCV RBC AUTO: 90.7 FL (ref 79–97)
MONOCYTES # BLD AUTO: 0.66 10*3/MM3 (ref 0.1–0.9)
MONOCYTES NFR BLD AUTO: 10.8 % (ref 5–12)
NEUTROPHILS NFR BLD AUTO: 2.15 10*3/MM3 (ref 1.7–7)
NEUTROPHILS NFR BLD AUTO: 35 % (ref 42.7–76)
NRBC BLD AUTO-RTO: 0 /100 WBC (ref 0–0.2)
PLATELET # BLD AUTO: 230 10*3/MM3 (ref 140–450)
PMV BLD AUTO: 9 FL (ref 6–12)
POTASSIUM SERPL-SCNC: 4.1 MMOL/L (ref 3.5–5.2)
PROT SERPL-MCNC: 7 G/DL (ref 6–8.5)
RBC # BLD AUTO: 4.29 10*6/MM3 (ref 4.14–5.8)
SODIUM SERPL-SCNC: 136 MMOL/L (ref 136–145)
WBC NRBC COR # BLD AUTO: 6.13 10*3/MM3 (ref 3.4–10.8)

## 2024-10-02 PROCEDURE — 80053 COMPREHEN METABOLIC PANEL: CPT | Performed by: INTERNAL MEDICINE

## 2024-10-02 PROCEDURE — 85025 COMPLETE CBC W/AUTO DIFF WBC: CPT | Performed by: INTERNAL MEDICINE

## 2024-10-02 PROCEDURE — 83036 HEMOGLOBIN GLYCOSYLATED A1C: CPT | Performed by: INTERNAL MEDICINE

## 2024-10-02 PROCEDURE — 63710000001 INSULIN LISPRO (HUMAN) PER 5 UNITS: Performed by: INTERNAL MEDICINE

## 2024-10-02 PROCEDURE — 99232 SBSQ HOSP IP/OBS MODERATE 35: CPT | Performed by: STUDENT IN AN ORGANIZED HEALTH CARE EDUCATION/TRAINING PROGRAM

## 2024-10-02 PROCEDURE — 25810000003 SODIUM CHLORIDE 0.9 % SOLUTION 250 ML FLEX CONT: Performed by: INTERNAL MEDICINE

## 2024-10-02 PROCEDURE — 25010000002 VANCOMYCIN HCL 1.25 G RECONSTITUTED SOLUTION 1 EACH VIAL: Performed by: INTERNAL MEDICINE

## 2024-10-02 PROCEDURE — 97161 PT EVAL LOW COMPLEX 20 MIN: CPT

## 2024-10-02 PROCEDURE — 82948 REAGENT STRIP/BLOOD GLUCOSE: CPT

## 2024-10-02 PROCEDURE — 25010000002 CEFEPIME PER 500 MG: Performed by: INTERNAL MEDICINE

## 2024-10-02 RX ORDER — CLOPIDOGREL BISULFATE 75 MG/1
75 TABLET ORAL DAILY
Status: DISCONTINUED | OUTPATIENT
Start: 2024-10-02 | End: 2024-10-07 | Stop reason: HOSPADM

## 2024-10-02 RX ADMIN — Medication 10 ML: at 21:13

## 2024-10-02 RX ADMIN — VANCOMYCIN HYDROCHLORIDE 1250 MG: 1.25 INJECTION, POWDER, LYOPHILIZED, FOR SOLUTION INTRAVENOUS at 21:01

## 2024-10-02 RX ADMIN — INSULIN LISPRO 2 UNITS: 100 INJECTION, SOLUTION INTRAVENOUS; SUBCUTANEOUS at 11:42

## 2024-10-02 RX ADMIN — CEFEPIME 2000 MG: 2 INJECTION, POWDER, FOR SOLUTION INTRAVENOUS at 11:42

## 2024-10-02 RX ADMIN — INSULIN LISPRO 2 UNITS: 100 INJECTION, SOLUTION INTRAVENOUS; SUBCUTANEOUS at 21:00

## 2024-10-02 RX ADMIN — METFORMIN HYDROCHLORIDE 500 MG: 500 TABLET, FILM COATED ORAL at 09:15

## 2024-10-02 RX ADMIN — GLIPIZIDE 5 MG: 5 TABLET ORAL at 17:34

## 2024-10-02 RX ADMIN — GLIPIZIDE 5 MG: 5 TABLET ORAL at 09:15

## 2024-10-02 RX ADMIN — CEFEPIME 2000 MG: 2 INJECTION, POWDER, FOR SOLUTION INTRAVENOUS at 04:36

## 2024-10-02 RX ADMIN — METFORMIN HYDROCHLORIDE 500 MG: 500 TABLET, FILM COATED ORAL at 17:34

## 2024-10-02 RX ADMIN — Medication 10 ML: at 09:16

## 2024-10-02 RX ADMIN — CEFEPIME 2000 MG: 2 INJECTION, POWDER, FOR SOLUTION INTRAVENOUS at 18:21

## 2024-10-02 RX ADMIN — VANCOMYCIN HYDROCHLORIDE 1250 MG: 1.25 INJECTION, POWDER, LYOPHILIZED, FOR SOLUTION INTRAVENOUS at 09:15

## 2024-10-02 RX ADMIN — CLOPIDOGREL BISULFATE 75 MG: 75 TABLET, FILM COATED ORAL at 09:15

## 2024-10-02 NOTE — CASE MANAGEMENT/SOCIAL WORK
Discharge Planning Assessment  [facility]     Patient Name: Marbin Hawkins  MRN: [id number]  Today's Date: 10/2/2024    Admit Date: 10/1/2024    Plan: Home with wife   Discharge Needs Assessment       Row Name 10/02/24 0959       Living Environment    People in Home spouse    Name(s) of People in Home Vane wife and 3 y/o child    Current Living Arrangements home    Potentially Unsafe Housing Conditions none    Primary Care Provided by self    Family Caregiver if Needed spouse    Quality of Family Relationships involved;helpful    Able to Return to Prior Arrangements yes       Resource/Environmental Concerns    Resource/Environmental Concerns none       Transition Planning    Patient/Family Anticipates Transition to home with family    Patient/Family Anticipated Services at Transition none    Transportation Anticipated family or friend will provide       Discharge Needs Assessment    Readmission Within the Last 30 Days no previous admission in last 30 days    Equipment Currently Used at Home none    Concerns to be Addressed no discharge needs identified    Anticipated Changes Related to Illness none    Equipment Needed After Discharge none                   Discharge Plan       Row Name 10/02/24 1000       Plan    Plan Home with wife    Patient/Family in Agreement with Plan yes    Plan Comments Spoke to patient at bedside, introduced self and explained CCP role, face sheet and pharmacy information verified. Pt lives with his wife Vane and their 3 y/o child, in multi- level home with 4 MIKI, he is IADL's, uses no medical equipment, he has no HH or SNF history. He plans home with family to transport, unsure of plan for toe if will need IV abx, dressing change assistance etc, CCP will follow for needs. -Gila DEJESUS                  Continued Care and Services - Admitted Since 10/1/2024    No active coordination exists for this encounter.       Expected Discharge Date and Time       Expected Discharge Date Expected  Discharge Time    Oct 4, 2024            Demographic Summary       Row Name 10/02/24 0959       General Information    Admission Type inpatient                   Functional Status       Row Name 10/02/24 0959       Functional Status    Usual Activity Tolerance excellent    Current Activity Tolerance excellent       Assessment of Health Literacy    Health Literacy Excellent       Functional Status, IADL    Medications independent    Meal Preparation independent    Housekeeping independent    Laundry independent    Shopping independent       Mental Status    General Appearance WDL WDL       Mental Status Summary    Recent Changes in Mental Status/Cognitive Functioning no changes                   Psychosocial    No documentation.                  Abuse/Neglect    No documentation.                  Legal       Row Name 10/02/24 0959       Financial/Legal    Who Manages Finances if Patient Unable wife                   Substance Abuse    No documentation.                  Patient Forms    No documentation.                     Gila Izquierdo RN

## 2024-10-02 NOTE — CONSULTS
[facility] Vascular Surgery  Consult Note    Patient Name: Marbin Hawkins  : 1965  MRN: [id number]  Primary Care Physician:  Joyce Restrepo APRN  Referring Physician: Rickey Red MD  Date of admission: 10/1/2024    Inpatient Vascular Surgery Consult  Consult performed by: Carter Silva II, MD  Consult ordered by: Canelo Canseco MD        Subjective   Subjective     Reason for Consult/ Chief Complaint: Right toe infection    History of Present Illness  Marbin Hawkins is a 59 y.o. male with diabetes and peripheral arterial disease status post arteriogram and bilateral iliac stent placement with excisional debridement of right great toe wound by Dr. Chiu on 2024 who saw our nurse practitioner Diana James in the office yesterday due to worsening redness and some bleeding from his right first toe wound.  An x-ray was ordered that showed osteomyelitis and he was then referred to the hospital for admission for IV antibiotics.  The patient reports his legs have improved since iliac stent placement overall but the toe has continued to have this wound.  He denies any fevers or chills.  He otherwise feels well.  He is worried about needing a toe amputation    Review of Systems     Personal History     Past Medical History:   Diagnosis Date    Diabetes mellitus        Past Surgical History:   Procedure Laterality Date    APPENDECTOMY      ARTERIOGRAM LOWER EXTREMITY WITH ANGIOPLASTY STENT Bilateral 2024    Procedure: BILATERAL LOWER EXTREMITY ARTERIOGRAM WITH BILATERAL ILIAC ARTERY ANGIOPLASTY AND STENT placement; right great toe debridement;  Surgeon: Jes Chiu Jr., MD;  Location: [facility] OR;  Service: Vascular;  Laterality: Bilateral;    CARPAL TUNNEL RELEASE Left     INCISION AND DRAINAGE LEG Right 2024    Procedure: right great toe debridement;  Surgeon: Jes Chiu Jr., MD;  Location: [facility] OR;  Service: Vascular;  Laterality: Right;       Family  History: His family history includes Alzheimer's disease in his mother; Heart attack in his father; Lung cancer in his brother; No Known Problems in his brother and brother; Uterine cancer in his sister.     Social History: He  reports that he has quit smoking. His smoking use included cigarettes. He has a 105 pack-year smoking history. He has never used smokeless tobacco. He reports that he does not drink alcohol and does not use drugs.    Home Medications:  ciprofloxacin, clopidogrel, glipizide, metFORMIN, naproxen, and traMADol    Allergies:  He has No Known Allergies.    Objective    Objective     Vitals:    Temp:  [97.5 °F (36.4 °C)-98.4 °F (36.9 °C)] 97.5 °F (36.4 °C)  Heart Rate:  [64-85] 64  Resp:  [16-18] 18  BP: (105-128)/(67-77) 116/77    Physical Exam  NAD  NCAT  RRR  Respirations unlabored  Strongly palpable right PT pulse, weak right DP pulse at top of his foot.  Circular ulceration on dorsal surface of right first toe with scant seropurulent drainage.  Moderate surrounding erythema.    Result Review    Result Review:  I have personally reviewed the results from the time of this admission to 10/2/2024 10:12 EDT and agree with these findings:  [x]  Laboratory list / accordion  [x]  Microbiology  [x]  Radiology  []  EKG/Telemetry   [x]  Cardiology/Vascular   []  Pathology  []  Old records  []  Other:  Most notable findings include: X-ray of his right foot shows osteomyelitis of the distal phalanx and distal aspect of proximal phalanx of his first toe.  No leukocytosis.  Mild anemia with hemoglobin 12.  Hemoglobin A1c 5.9.      CBC    Results from last 7 days   Lab Units 10/02/24  0444 10/01/24  1959   WBC 10*3/mm3 6.13 6.80   HEMOGLOBIN g/dL 12.6* 12.3*   PLATELETS 10*3/mm3 230 246     BMP   Results from last 7 days   Lab Units 10/02/24  0444 10/01/24  1959   SODIUM mmol/L 136 139   POTASSIUM mmol/L 4.1 4.0   CHLORIDE mmol/L 104 103   CO2 mmol/L 21.2* 24.4   BUN mg/dL 12 14   CREATININE mg/dL 0.58* 0.79  "  GLUCOSE mg/dL 85 102*     Cr Clearance Estimated Creatinine Clearance: 208.1 mL/min (A) (by C-G formula based on SCr of 0.58 mg/dL (L)).  Coag     HbA1C   Lab Results   Component Value Date    HGBA1C 5.90 (H) 10/02/2024    HGBA1C 5.90 (H) 08/24/2024    HGBA1C 10.7 (H) 06/04/2024     Blood Glucose   Glucose   Date/Time Value Ref Range Status   10/02/2024 0622 109 70 - 130 mg/dL Final   10/01/2024 2047 119 70 - 130 mg/dL Final     Infection   Results from last 7 days   Lab Units 10/01/24  1959   PROCALCITONIN ng/mL 0.03     CMP   Results from last 7 days   Lab Units 10/02/24  0444 10/01/24  1959   SODIUM mmol/L 136 139   POTASSIUM mmol/L 4.1 4.0   CHLORIDE mmol/L 104 103   CO2 mmol/L 21.2* 24.4   BUN mg/dL 12 14   CREATININE mg/dL 0.58* 0.79   GLUCOSE mg/dL 85 102*   ALBUMIN g/dL 3.5 3.6   BILIRUBIN mg/dL 0.4 0.4   ALK PHOS U/L 71 78   AST (SGOT) U/L 12 14   ALT (SGPT) U/L 12 11     ABG      UA      LISSA  No results found for: \"POCMETH\", \"POCAMPHET\", \"POCBARBITUR\", \"POCBENZO\", \"POCCOCAINE\", \"POCOPIATES\", \"POCOXYCODO\", \"POCPHENCYC\", \"POCPROPOXY\", \"POCTHC\", \"POCTRICYC\"  Lysis Labs   Results from last 7 days   Lab Units 10/02/24  0444 10/01/24  1959   HEMOGLOBIN g/dL 12.6* 12.3*   PLATELETS 10*3/mm3 230 246   CREATININE mg/dL 0.58* 0.79     Radiology(recent) No radiology results for the last day    Assessment & Plan   Assessment / Plan     Brief Patient Summary:  Marbin Hawkins is a 59 y.o. male with peripheral arterial disease status post bilateral iliac stents with chronic right toe wound status postdebridement 1 month ago now with osteomyelitis of right first toe with some surrounding cellulitis.  Discussed with patient that he will need toe amputated but unclear the extent of his osteomyelitis.  I suspect he will require first toe and metatarsal head resection.  Likely plan for surgery tomorrow.    Active Hospital Problems:  Active Hospital Problems    Diagnosis     **Osteomyelitis of great toe of right foot     " Peripheral vascular disease     Atherosclerosis of native artery of extremity     Diabetic foot ulcer     Type 2 diabetes mellitus        Plan:   Okay for diet now  Continue antibiotics and supportive care per primary team.  Currently on cefepime and vancomycin.  Okay to continue his Plavix.  Will follow-up results of MRI.  Will likely plan for surgery tomorrow for toe amputation.     VTE Prophylaxis:  Mechanical VTE prophylaxis orders are present.         MD Carter Ulloa II, II, MD  10/02/24  10:12 EDT  Office Number [phone]    [facility]

## 2024-10-02 NOTE — PLAN OF CARE
Goal Outcome Evaluation:  Plan of Care Reviewed With: patient        Progress: no change  Outcome Evaluation: New admit for right great toe infection.  To have MRI when available.  Up ad kisha in room.  NPO after MN for vascular MD to see.  Dressing changed with vaseline gauze and betadine.

## 2024-10-02 NOTE — THERAPY DISCHARGE NOTE
Patient Name: Marbin Hawkins  : 1965    MRN: [id number]                              Today's Date: 10/2/2024       Admit Date: 10/1/2024    Visit Dx: No diagnosis found.  Patient Active Problem List   Diagnosis    Screen for colon cancer    Hyperlipidemia    Type 2 diabetes mellitus    Diabetic foot ulcer    Hyperlipidemia    Hematoma of left third toe    Tobacco use    Asymptomatic PVD (peripheral vascular disease)    Atherosclerosis of native artery of extremity    Osteomyelitis of great toe of right foot    Peripheral vascular disease     Past Medical History:   Diagnosis Date    Diabetes mellitus      Past Surgical History:   Procedure Laterality Date    APPENDECTOMY      ARTERIOGRAM LOWER EXTREMITY WITH ANGIOPLASTY STENT Bilateral 2024    Procedure: BILATERAL LOWER EXTREMITY ARTERIOGRAM WITH BILATERAL ILIAC ARTERY ANGIOPLASTY AND STENT placement; right great toe debridement;  Surgeon: Jes Chiu Jr., MD;  Location: [facility] OR;  Service: Vascular;  Laterality: Bilateral;    CARPAL TUNNEL RELEASE Left     INCISION AND DRAINAGE LEG Right 2024    Procedure: right great toe debridement;  Surgeon: Jes Chiu Jr., MD;  Location: [facility] OR;  Service: Vascular;  Laterality: Right;      General Information       Row Name 10/02/24 0836          Physical Therapy Time and Intention    Document Type discharge evaluation/summary  -MS     Mode of Treatment physical therapy;individual therapy  -MS       Row Name 10/02/24 0836          General Information    Patient Profile Reviewed yes  -MS     Prior Level of Function independent:  -MS     Barriers to Rehab none identified  -MS       Row Name 10/02/24 0836          Cognition    Orientation Status (Cognition) oriented x 3  -MS               User Key  (r) = Recorded By, (t) = Taken By, (c) = Cosigned By      Initials Name Provider Type    Farooq Florentino, PT Physical Therapist                   Mobility       Row Name  "10/02/24 0836          Bed Mobility    Bed Mobility supine-sit;sit-supine  -MS     Supine-Sit [place] (Bed Mobility) independent  -MS     Sit-Supine [place] (Bed Mobility) independent  -MS       Row Name 10/02/24 0836          Sit-Stand Transfer    Sit-Stand [place] (Transfers) independent  -MS       Row Name 10/02/24 0836          Gait/Stairs (Locomotion)    [place] Level (Gait) independent  -MS     Distance in Feet (Gait) 40  -MS     Comment, (Gait/Stairs) Mild Right toe pain during ambulation but pt. with no balance deficits noted.  -MS               User Key  (r) = Recorded By, (t) = Taken By, (c) = Cosigned By      Initials Name Provider Type    MS ScottFarooq, PT Physical Therapist                   Obj/Interventions       Row Name 10/02/24 0837          Range of Motion Comprehensive    Comment, General Range of Motion BUE/LE (WFL's)  -MS       Row Name 10/02/24 0837          Strength Comprehensive (MMT)    Comment, General Manual Muscle Testing (MMT) Assessment BUE/LE (4+/5)  -MS               User Key  (r) = Recorded By, (t) = Taken By, (c) = Cosigned By      Initials Name Provider Type    MS ScottFarooq, PT Physical Therapist                   Goals/Plan    No documentation.                  Clinical Impression       Row Name 10/02/24 0837          Pain    Pre/Posttreatment Pain Comment Pt. reports \"mild\" Right great toe pain/discomfort during ambulation.  Does not give specific pain rating.  -MS     Pain Intervention(s) Medication (See MAR);Nursing Notified  -MS       Row Name 10/02/24 0837          Plan of Care Review    Plan of Care Reviewed With patient  -MS       Row Name 10/02/24 0837          Therapy Assessment/Plan (PT)    Criteria for Skilled Interventions Met (PT) no problems identified which require skilled intervention  -MS       Row Name 10/02/24 0837          Positioning and Restraints    Pre-Treatment Position in bed  -MS     Post Treatment Position bed  -MS  "    In Bed notified nsg;supine;call light within reach;encouraged to call for assist  All lines intact.  -MS               User Key  (r) = Recorded By, (t) = Taken By, (c) = Cosigned By      Initials Name Provider Type    Farooq Florentino, PT Physical Therapist                   Outcome Measures       Row Name 10/02/24 0838          How much help from another person do you currently need...    Turning from your back to your side while in flat bed without using bedrails? 4  -MS     Moving from lying on back to sitting on the side of a flat bed without bedrails? 4  -MS     Moving to and from a bed to a chair (including a wheelchair)? 4  -MS     Standing up from a chair using your arms (e.g., wheelchair, bedside chair)? 4  -MS     Climbing 3-5 steps with a railing? 4  -MS     To walk in hospital room? 4  -MS     AM-[facility] 6 Clicks Score (PT) 24  -MS     Highest Level of Mobility Goal 8 --> Walked 250 feet or more  -MS       Row Name 10/02/24 0838          Functional Assessment    Outcome Measure Options AM-PAC 6 Clicks Basic Mobility (PT)  -MS               User Key  (r) = Recorded By, (t) = Taken By, (c) = Cosigned By      Initials Name Provider Type    Farooq Florentino, PT Physical Therapist                  Physical Therapy Education       Title: PT OT SLP Therapies (Resolved)       Topic: Physical Therapy (Resolved)       Point: Mobility training (Resolved)       Learning Progress Summary             Patient Acceptance, E,D, VU,DU by MS at 10/2/2024 0838                         Point: Home exercise program (Resolved)       Learner Progress:  Not documented in this visit.              Point: Body mechanics (Resolved)       Learning Progress Summary             Patient Acceptance, E,D, VU,DU by MS at 10/2/2024 0838                         Point: Precautions (Resolved)       Learning Progress Summary             Patient Acceptance, E,D, VU,DU by MS at 10/2/2024 0838                                         User Key        Initials Effective Dates Name Provider Type Discipline    MS 06/16/21 -  Farooq Scott, PT Physical Therapist PT                  PT Recommendation and Plan     Plan of Care Reviewed With: patient  Outcome Evaluation: Pt. is currently independent with functional mobility and has no further questions/concerns regarding functional mobility or home safety.  Encouraged pt. to continue ambulation with nursing staff while here in the hospital.  Otherwise, P.T. will sign off.     Time Calculation:         PT Charges       Row Name 10/02/24 0839             Time Calculation    Start Time 0805  -MS      Stop Time 0818  -MS      Time Calculation (min) 13 min  -MS      PT Received On 10/02/24  -MS         Time Calculation- PT    Total Timed Code Minutes- PT 12 minute(s)  -MS                User Key  (r) = Recorded By, (t) = Taken By, (c) = Cosigned By      Initials Name Provider Type    MS Farooq Scott, PT Physical Therapist                  Therapy Charges for Today       Code Description Service Date Service Provider Modifiers Qty    54392053062 HC PT EVAL LOW COMPLEXITY 2 10/2/2024 Farooq Scott, PT GP 1            PT G-Codes  Outcome Measure Options: AM-PAC 6 Clicks Basic Mobility (PT)  AM-PAC 6 Clicks Score (PT): 24    PT Discharge Summary  Anticipated Discharge Disposition (PT): home  Reason for Discharge: Independent  Discharge Destination: Home    Farooq Scott PT  10/2/2024

## 2024-10-02 NOTE — PROGRESS NOTES
"[facility] Clinical Pharmacy Services: Vancomycin Pharmacokinetic Initial Consult Note    Marbin Hawkins is a 59 y.o. male who is on day 1 of pharmacy to dose vancomycin.    Indication: Bone and/or Joint Infection  Consulting Provider: Ajith  Planned Duration of Therapy: 7 days  Loading Dose Ordered or Given: 2500 mg on 10/1 at 2200  MRSA PCR performed: 10/1; Result: pending  Culture/Source:   10/1 BloodCx - pending x2  Target: -600 mg/L.hr   Pertinent Vanc Dosing History: n/a  Other Antimicrobials: cefepime    Vitals/Labs  Ht: 203.2 cm (80\"); Wt: 121 kg (267 lb 12.8 oz)  Temp Readings from Last 1 Encounters:   10/01/24 97.9 °F (36.6 °C) (Oral)    Estimated Creatinine Clearance: 152.8 mL/min (by C-G formula based on SCr of 0.79 mg/dL).       Results from last 7 days   Lab Units 10/01/24  1959   CREATININE mg/dL 0.79   WBC 10*3/mm3 6.80     Assessment/Plan:    Vancomycin Dose:   1250 mg IV every  24  hours  Predictive AUC level for the dose ordered is 451 mg/L.hr, which is within the target of 400-600 mg/L.hr  No vanc levels ordered at this time - will defer to AM clinical       Pharmacy will follow patient's kidney function and will adjust doses and obtain levels as necessary. Thank you for involving pharmacy in this patient's care. Please contact pharmacy with any questions or concerns.                           Shila Luis, [facility]  Clinical Pharmacist    "

## 2024-10-02 NOTE — PROGRESS NOTES
"    DAILY PROGRESS NOTE  [facility]    Patient Identification:  Name: Marbin Hawkins  Age: 59 y.o.  Sex: male  :  1965  MRN: [id number]         Primary Care Physician: Joyce Restrepo APRN    Subjective:  Interval History: Spent majority of time counseling patient with spouse on phone.  .  Explained osteomyelitis and involvement with the bone as her clinical concern.  He understands and accepts.  Denies any chest pain troubles breathing nausea vomiting or diarrhea    Objective: Casual and conversational.  Nontoxic    Scheduled Meds:cefepime, 2,000 mg, Intravenous, Q8H  clopidogrel, 75 mg, Oral, Daily  glipizide, 5 mg, Oral, BID AC  insulin lispro, 2-7 Units, Subcutaneous, 4x Daily AC & at Bedtime  metFORMIN, 500 mg, Oral, BID With Meals  sodium chloride, 10 mL, Intravenous, Q12H  vancomycin, 1,250 mg, Intravenous, Q12H      Continuous Infusions:Pharmacy to dose vancomycin,         Vital signs in last 24 hours:  Temp:  [97.5 °F (36.4 °C)-98.4 °F (36.9 °C)] 97.5 °F (36.4 °C)  Heart Rate:  [64-85] 64  Resp:  [16-18] 18  BP: (105-128)/(67-77) 116/77    Intake/Output:    Intake/Output Summary (Last 24 hours) at 10/2/2024 1057  Last data filed at 10/2/2024 0540  Gross per 24 hour   Intake 1610 ml   Output --   Net 1610 ml       Exam:  /77 (BP Location: Right arm, Patient Position: Lying)   Pulse 64   Temp 97.5 °F (36.4 °C) (Oral)   Resp 18   Ht 203.2 cm (80\")   Wt 121 kg (266 lb 12.1 oz)   SpO2 98%   BMI 29.30 kg/m²     General Appearance:    Alert, cooperative, AAOx3                         Throat:   Oral mucosa pink and moist                           Neck:   No JVD                         Lungs:    Clear to auscultation bilaterally, respirations unlabored                          Heart:    Regular rate and rhythm, S1 and S2 normal                  Abdomen:     Soft, nontender, bowel sounds active                 Extremities: Stasis changes, good DP pulses right foot, right " great toe swollen erythematous warm consistent with cellulitis and infection with concern for osteomyelitis given chronic plantar dime size ulceration but no ascending cellulitis up the foot/leg                  Neurologic:   CNII-XII intact     Data Review:  Labs in chart were reviewed.    Assessment:  Active Hospital Problems    Diagnosis  POA    **Osteomyelitis of great toe of right foot [M86.9]  Unknown    Peripheral vascular disease [I73.9]  Unknown    Atherosclerosis of native artery of extremity [I70.209]  Yes    Diabetic foot ulcer [E11.621, L97.509]  Yes    Type 2 diabetes mellitus [E11.9]  Yes      Resolved Hospital Problems   No resolved problems to display.       Plan:    DM2 with circulatory disorder with PVD with previous bilateral iliac stent placement now with concern for osteomyelitis of the right great toe with a plantar wound/nonhealing   -MRI foot pending   -IV vancomycin/cefepime with surgical plans for tomorrow   -Okay to continue Plavix per vascular      DM2 on metformin and glipizide with an A1c of 5.9   -SSI for now    No sepsis present.  Labs overall reassuring    Gato Camarillo MD  10/2/2024  10:57 EDT

## 2024-10-02 NOTE — PROGRESS NOTES
"[facility] Clinical Pharmacy Services: Vancomycin Monitoring Note    Marbin Hawkins is a 59 y.o. male who is on day 2/7 of pharmacy to dose vancomycin for Bone and/or Joint Infection.    Previous Vancomycin Dose:    1250 mg IV every  12  hours  Updated Cultures and Sensitivities: blood culture pending      Vitals/Labs  Ht: 203.2 cm (80\"); Wt: 121 kg (266 lb 12.1 oz)   Temp Readings from Last 1 Encounters:   10/02/24 97.5 °F (36.4 °C) (Oral)     Estimated Creatinine Clearance: 208.1 mL/min (A) (by C-G formula based on SCr of 0.58 mg/dL (L)).       Results from last 7 days   Lab Units 10/02/24  0444 10/01/24  1959   CREATININE mg/dL 0.58* 0.79   WBC 10*3/mm3 6.13 6.80     Assessment/Plan    Current Vancomycin Dose: 1250 mg IV every  12  hours; provides a predicted  mg/L.hr   Next Level Date and Time: Vanc Trough on 10/3 at 21:30  We will continue to monitor patient changes and renal function     Thank you for involving pharmacy in this patient's care. Please contact pharmacy with any questions or concerns.       Mike Hines PharmD  Clinical Pharmacist          "

## 2024-10-03 ENCOUNTER — APPOINTMENT (OUTPATIENT)
Dept: MRI IMAGING | Facility: HOSPITAL | Age: 59
End: 2024-10-03
Payer: MEDICAID

## 2024-10-03 ENCOUNTER — ANESTHESIA (OUTPATIENT)
Dept: PERIOP | Facility: HOSPITAL | Age: 59
End: 2024-10-03
Payer: MEDICAID

## 2024-10-03 ENCOUNTER — ANESTHESIA EVENT (OUTPATIENT)
Dept: PERIOP | Facility: HOSPITAL | Age: 59
End: 2024-10-03
Payer: MEDICAID

## 2024-10-03 LAB
GLUCOSE BLDC GLUCOMTR-MCNC: 113 MG/DL (ref 70–130)
GLUCOSE BLDC GLUCOMTR-MCNC: 120 MG/DL (ref 70–130)
GLUCOSE BLDC GLUCOMTR-MCNC: 142 MG/DL (ref 70–130)
GLUCOSE BLDC GLUCOMTR-MCNC: 147 MG/DL (ref 70–130)
GLUCOSE BLDC GLUCOMTR-MCNC: 89 MG/DL (ref 70–130)
VANCOMYCIN TROUGH SERPL-MCNC: 7.2 MCG/ML (ref 5–20)

## 2024-10-03 PROCEDURE — 25010000002 PROPOFOL 500 MG/50ML EMULSION: Performed by: NURSE ANESTHETIST, CERTIFIED REGISTERED

## 2024-10-03 PROCEDURE — 87176 TISSUE HOMOGENIZATION CULTR: CPT | Performed by: STUDENT IN AN ORGANIZED HEALTH CARE EDUCATION/TRAINING PROGRAM

## 2024-10-03 PROCEDURE — 25010000002 BUPIVACAINE (PF) 0.25 % SOLUTION 30 ML VIAL: Performed by: STUDENT IN AN ORGANIZED HEALTH CARE EDUCATION/TRAINING PROGRAM

## 2024-10-03 PROCEDURE — 25810000003 SODIUM CHLORIDE 0.9 % SOLUTION 250 ML FLEX CONT: Performed by: INTERNAL MEDICINE

## 2024-10-03 PROCEDURE — 25010000002 LIDOCAINE 2% SOLUTION: Performed by: NURSE ANESTHETIST, CERTIFIED REGISTERED

## 2024-10-03 PROCEDURE — 99024 POSTOP FOLLOW-UP VISIT: CPT | Performed by: STUDENT IN AN ORGANIZED HEALTH CARE EDUCATION/TRAINING PROGRAM

## 2024-10-03 PROCEDURE — 25810000003 LACTATED RINGERS PER 1000 ML: Performed by: ANESTHESIOLOGY

## 2024-10-03 PROCEDURE — 80202 ASSAY OF VANCOMYCIN: CPT | Performed by: STUDENT IN AN ORGANIZED HEALTH CARE EDUCATION/TRAINING PROGRAM

## 2024-10-03 PROCEDURE — 25010000002 CEFEPIME PER 500 MG: Performed by: STUDENT IN AN ORGANIZED HEALTH CARE EDUCATION/TRAINING PROGRAM

## 2024-10-03 PROCEDURE — 25010000002 VANCOMYCIN HCL 1.25 G RECONSTITUTED SOLUTION 1 EACH VIAL: Performed by: INTERNAL MEDICINE

## 2024-10-03 PROCEDURE — 25010000002 FENTANYL CITRATE (PF) 50 MCG/ML SOLUTION: Performed by: NURSE ANESTHETIST, CERTIFIED REGISTERED

## 2024-10-03 PROCEDURE — 25010000002 ONDANSETRON PER 1 MG: Performed by: NURSE ANESTHETIST, CERTIFIED REGISTERED

## 2024-10-03 PROCEDURE — A9577 INJ MULTIHANCE: HCPCS | Performed by: HOSPITALIST

## 2024-10-03 PROCEDURE — 73720 MRI LWR EXTREMITY W/O&W/DYE: CPT

## 2024-10-03 PROCEDURE — 0Y6N0Z9 DETACHMENT AT LEFT FOOT, PARTIAL 1ST RAY, OPEN APPROACH: ICD-10-PCS | Performed by: STUDENT IN AN ORGANIZED HEALTH CARE EDUCATION/TRAINING PROGRAM

## 2024-10-03 PROCEDURE — 87015 SPECIMEN INFECT AGNT CONCNTJ: CPT | Performed by: STUDENT IN AN ORGANIZED HEALTH CARE EDUCATION/TRAINING PROGRAM

## 2024-10-03 PROCEDURE — 88305 TISSUE EXAM BY PATHOLOGIST: CPT | Performed by: STUDENT IN AN ORGANIZED HEALTH CARE EDUCATION/TRAINING PROGRAM

## 2024-10-03 PROCEDURE — 28820 AMPUTATION OF TOE: CPT | Performed by: STUDENT IN AN ORGANIZED HEALTH CARE EDUCATION/TRAINING PROGRAM

## 2024-10-03 PROCEDURE — 88311 DECALCIFY TISSUE: CPT | Performed by: STUDENT IN AN ORGANIZED HEALTH CARE EDUCATION/TRAINING PROGRAM

## 2024-10-03 PROCEDURE — 82948 REAGENT STRIP/BLOOD GLUCOSE: CPT

## 2024-10-03 PROCEDURE — 25010000002 CEFEPIME PER 500 MG: Performed by: INTERNAL MEDICINE

## 2024-10-03 PROCEDURE — 25010000002 MORPHINE PER 10 MG: Performed by: STUDENT IN AN ORGANIZED HEALTH CARE EDUCATION/TRAINING PROGRAM

## 2024-10-03 PROCEDURE — 0 GADOBENATE DIMEGLUMINE 529 MG/ML SOLUTION: Performed by: HOSPITALIST

## 2024-10-03 PROCEDURE — 87070 CULTURE OTHR SPECIMN AEROBIC: CPT | Performed by: STUDENT IN AN ORGANIZED HEALTH CARE EDUCATION/TRAINING PROGRAM

## 2024-10-03 PROCEDURE — 87075 CULTR BACTERIA EXCEPT BLOOD: CPT | Performed by: STUDENT IN AN ORGANIZED HEALTH CARE EDUCATION/TRAINING PROGRAM

## 2024-10-03 PROCEDURE — 25010000002 PROPOFOL 10 MG/ML EMULSION: Performed by: NURSE ANESTHETIST, CERTIFIED REGISTERED

## 2024-10-03 PROCEDURE — 87205 SMEAR GRAM STAIN: CPT | Performed by: STUDENT IN AN ORGANIZED HEALTH CARE EDUCATION/TRAINING PROGRAM

## 2024-10-03 PROCEDURE — 25010000002 LIDOCAINE 1 % SOLUTION 20 ML VIAL: Performed by: STUDENT IN AN ORGANIZED HEALTH CARE EDUCATION/TRAINING PROGRAM

## 2024-10-03 RX ORDER — DROPERIDOL 2.5 MG/ML
0.62 INJECTION, SOLUTION INTRAMUSCULAR; INTRAVENOUS
Status: DISCONTINUED | OUTPATIENT
Start: 2024-10-03 | End: 2024-10-03 | Stop reason: HOSPADM

## 2024-10-03 RX ORDER — FENTANYL CITRATE 50 UG/ML
INJECTION, SOLUTION INTRAMUSCULAR; INTRAVENOUS AS NEEDED
Status: DISCONTINUED | OUTPATIENT
Start: 2024-10-03 | End: 2024-10-03 | Stop reason: SURG

## 2024-10-03 RX ORDER — MAGNESIUM HYDROXIDE 1200 MG/15ML
LIQUID ORAL AS NEEDED
Status: DISCONTINUED | OUTPATIENT
Start: 2024-10-03 | End: 2024-10-03 | Stop reason: HOSPADM

## 2024-10-03 RX ORDER — FENTANYL CITRATE 50 UG/ML
50 INJECTION, SOLUTION INTRAMUSCULAR; INTRAVENOUS
Status: DISCONTINUED | OUTPATIENT
Start: 2024-10-03 | End: 2024-10-03 | Stop reason: HOSPADM

## 2024-10-03 RX ORDER — FLUMAZENIL 0.1 MG/ML
0.2 INJECTION INTRAVENOUS AS NEEDED
Status: DISCONTINUED | OUTPATIENT
Start: 2024-10-03 | End: 2024-10-03 | Stop reason: HOSPADM

## 2024-10-03 RX ORDER — ONDANSETRON 2 MG/ML
INJECTION INTRAMUSCULAR; INTRAVENOUS AS NEEDED
Status: DISCONTINUED | OUTPATIENT
Start: 2024-10-03 | End: 2024-10-03 | Stop reason: SURG

## 2024-10-03 RX ORDER — NALOXONE HCL 0.4 MG/ML
0.2 VIAL (ML) INJECTION AS NEEDED
Status: DISCONTINUED | OUTPATIENT
Start: 2024-10-03 | End: 2024-10-03 | Stop reason: HOSPADM

## 2024-10-03 RX ORDER — OXYCODONE AND ACETAMINOPHEN 7.5; 325 MG/1; MG/1
1 TABLET ORAL EVERY 4 HOURS PRN
Status: DISCONTINUED | OUTPATIENT
Start: 2024-10-03 | End: 2024-10-03 | Stop reason: HOSPADM

## 2024-10-03 RX ORDER — IPRATROPIUM BROMIDE AND ALBUTEROL SULFATE 2.5; .5 MG/3ML; MG/3ML
3 SOLUTION RESPIRATORY (INHALATION) ONCE AS NEEDED
Status: DISCONTINUED | OUTPATIENT
Start: 2024-10-03 | End: 2024-10-03 | Stop reason: HOSPADM

## 2024-10-03 RX ORDER — HYDROMORPHONE HYDROCHLORIDE 1 MG/ML
0.5 INJECTION, SOLUTION INTRAMUSCULAR; INTRAVENOUS; SUBCUTANEOUS
Status: DISCONTINUED | OUTPATIENT
Start: 2024-10-03 | End: 2024-10-03 | Stop reason: HOSPADM

## 2024-10-03 RX ORDER — SODIUM CHLORIDE, SODIUM LACTATE, POTASSIUM CHLORIDE, CALCIUM CHLORIDE 600; 310; 30; 20 MG/100ML; MG/100ML; MG/100ML; MG/100ML
9 INJECTION, SOLUTION INTRAVENOUS CONTINUOUS
Status: DISCONTINUED | OUTPATIENT
Start: 2024-10-03 | End: 2024-10-04

## 2024-10-03 RX ORDER — LIDOCAINE HYDROCHLORIDE 10 MG/ML
0.5 INJECTION, SOLUTION INFILTRATION; PERINEURAL ONCE AS NEEDED
Status: DISCONTINUED | OUTPATIENT
Start: 2024-10-03 | End: 2024-10-03 | Stop reason: HOSPADM

## 2024-10-03 RX ORDER — FAMOTIDINE 10 MG/ML
20 INJECTION, SOLUTION INTRAVENOUS ONCE
Status: COMPLETED | OUTPATIENT
Start: 2024-10-03 | End: 2024-10-03

## 2024-10-03 RX ORDER — LIDOCAINE HYDROCHLORIDE 20 MG/ML
INJECTION, SOLUTION INFILTRATION; PERINEURAL AS NEEDED
Status: DISCONTINUED | OUTPATIENT
Start: 2024-10-03 | End: 2024-10-03 | Stop reason: SURG

## 2024-10-03 RX ORDER — FENTANYL CITRATE 50 UG/ML
50 INJECTION, SOLUTION INTRAMUSCULAR; INTRAVENOUS ONCE AS NEEDED
Status: DISCONTINUED | OUTPATIENT
Start: 2024-10-03 | End: 2024-10-03 | Stop reason: HOSPADM

## 2024-10-03 RX ORDER — MIDAZOLAM HYDROCHLORIDE 1 MG/ML
1 INJECTION INTRAMUSCULAR; INTRAVENOUS
Status: DISCONTINUED | OUTPATIENT
Start: 2024-10-03 | End: 2024-10-03 | Stop reason: HOSPADM

## 2024-10-03 RX ORDER — SODIUM CHLORIDE 0.9 % (FLUSH) 0.9 %
3 SYRINGE (ML) INJECTION EVERY 12 HOURS SCHEDULED
Status: DISCONTINUED | OUTPATIENT
Start: 2024-10-03 | End: 2024-10-03 | Stop reason: HOSPADM

## 2024-10-03 RX ORDER — HYDROCODONE BITARTRATE AND ACETAMINOPHEN 5; 325 MG/1; MG/1
1 TABLET ORAL ONCE AS NEEDED
Status: DISCONTINUED | OUTPATIENT
Start: 2024-10-03 | End: 2024-10-03 | Stop reason: HOSPADM

## 2024-10-03 RX ORDER — MORPHINE SULFATE 2 MG/ML
2 INJECTION, SOLUTION INTRAMUSCULAR; INTRAVENOUS EVERY 4 HOURS PRN
Status: DISCONTINUED | OUTPATIENT
Start: 2024-10-03 | End: 2024-10-05

## 2024-10-03 RX ORDER — OXYCODONE HYDROCHLORIDE 5 MG/1
5 TABLET ORAL EVERY 4 HOURS PRN
Status: DISCONTINUED | OUTPATIENT
Start: 2024-10-03 | End: 2024-10-07 | Stop reason: HOSPADM

## 2024-10-03 RX ORDER — PROMETHAZINE HYDROCHLORIDE 25 MG/1
25 TABLET ORAL ONCE AS NEEDED
Status: DISCONTINUED | OUTPATIENT
Start: 2024-10-03 | End: 2024-10-03 | Stop reason: HOSPADM

## 2024-10-03 RX ORDER — HYDRALAZINE HYDROCHLORIDE 20 MG/ML
5 INJECTION INTRAMUSCULAR; INTRAVENOUS
Status: DISCONTINUED | OUTPATIENT
Start: 2024-10-03 | End: 2024-10-03 | Stop reason: HOSPADM

## 2024-10-03 RX ORDER — EPHEDRINE SULFATE 50 MG/ML
5 INJECTION, SOLUTION INTRAVENOUS ONCE AS NEEDED
Status: DISCONTINUED | OUTPATIENT
Start: 2024-10-03 | End: 2024-10-03 | Stop reason: HOSPADM

## 2024-10-03 RX ORDER — PROMETHAZINE HYDROCHLORIDE 25 MG/1
25 SUPPOSITORY RECTAL ONCE AS NEEDED
Status: DISCONTINUED | OUTPATIENT
Start: 2024-10-03 | End: 2024-10-03 | Stop reason: HOSPADM

## 2024-10-03 RX ORDER — LABETALOL HYDROCHLORIDE 5 MG/ML
5 INJECTION, SOLUTION INTRAVENOUS
Status: DISCONTINUED | OUTPATIENT
Start: 2024-10-03 | End: 2024-10-03 | Stop reason: HOSPADM

## 2024-10-03 RX ORDER — PROPOFOL 10 MG/ML
INJECTION, EMULSION INTRAVENOUS AS NEEDED
Status: DISCONTINUED | OUTPATIENT
Start: 2024-10-03 | End: 2024-10-03 | Stop reason: SURG

## 2024-10-03 RX ORDER — OXYCODONE HYDROCHLORIDE 10 MG/1
10 TABLET ORAL EVERY 4 HOURS PRN
Status: DISCONTINUED | OUTPATIENT
Start: 2024-10-03 | End: 2024-10-07 | Stop reason: HOSPADM

## 2024-10-03 RX ORDER — SODIUM CHLORIDE 0.9 % (FLUSH) 0.9 %
3-10 SYRINGE (ML) INJECTION AS NEEDED
Status: DISCONTINUED | OUTPATIENT
Start: 2024-10-03 | End: 2024-10-03 | Stop reason: HOSPADM

## 2024-10-03 RX ORDER — ONDANSETRON 2 MG/ML
4 INJECTION INTRAMUSCULAR; INTRAVENOUS ONCE AS NEEDED
Status: DISCONTINUED | OUTPATIENT
Start: 2024-10-03 | End: 2024-10-03 | Stop reason: HOSPADM

## 2024-10-03 RX ORDER — DIPHENHYDRAMINE HYDROCHLORIDE 50 MG/ML
12.5 INJECTION INTRAMUSCULAR; INTRAVENOUS
Status: DISCONTINUED | OUTPATIENT
Start: 2024-10-03 | End: 2024-10-03 | Stop reason: HOSPADM

## 2024-10-03 RX ADMIN — CEFEPIME 2000 MG: 2 INJECTION, POWDER, FOR SOLUTION INTRAVENOUS at 12:38

## 2024-10-03 RX ADMIN — VANCOMYCIN HYDROCHLORIDE 1250 MG: 1.25 INJECTION, POWDER, LYOPHILIZED, FOR SOLUTION INTRAVENOUS at 09:41

## 2024-10-03 RX ADMIN — ONDANSETRON 4 MG: 2 INJECTION INTRAMUSCULAR; INTRAVENOUS at 13:04

## 2024-10-03 RX ADMIN — Medication 10 ML: at 09:41

## 2024-10-03 RX ADMIN — FAMOTIDINE 20 MG: 10 INJECTION INTRAVENOUS at 11:45

## 2024-10-03 RX ADMIN — OXYCODONE HYDROCHLORIDE 10 MG: 10 TABLET ORAL at 22:19

## 2024-10-03 RX ADMIN — SODIUM CHLORIDE, POTASSIUM CHLORIDE, SODIUM LACTATE AND CALCIUM CHLORIDE 9 ML/HR: 600; 310; 30; 20 INJECTION, SOLUTION INTRAVENOUS at 11:45

## 2024-10-03 RX ADMIN — GLIPIZIDE 5 MG: 5 TABLET ORAL at 16:51

## 2024-10-03 RX ADMIN — PROPOFOL 160 MCG/KG/MIN: 10 INJECTION, EMULSION INTRAVENOUS at 12:04

## 2024-10-03 RX ADMIN — Medication 10 ML: at 20:29

## 2024-10-03 RX ADMIN — CEFEPIME 2000 MG: 2 INJECTION, POWDER, FOR SOLUTION INTRAVENOUS at 02:32

## 2024-10-03 RX ADMIN — FENTANYL CITRATE 25 MCG: 50 INJECTION, SOLUTION INTRAMUSCULAR; INTRAVENOUS at 12:17

## 2024-10-03 RX ADMIN — GLIPIZIDE 5 MG: 5 TABLET ORAL at 06:38

## 2024-10-03 RX ADMIN — FENTANYL CITRATE 25 MCG: 50 INJECTION, SOLUTION INTRAMUSCULAR; INTRAVENOUS at 12:41

## 2024-10-03 RX ADMIN — OXYCODONE HYDROCHLORIDE 5 MG: 5 TABLET ORAL at 17:57

## 2024-10-03 RX ADMIN — GADOBENATE DIMEGLUMINE 20 ML: 529 INJECTION, SOLUTION INTRAVENOUS at 03:45

## 2024-10-03 RX ADMIN — METFORMIN HYDROCHLORIDE 500 MG: 500 TABLET, FILM COATED ORAL at 16:51

## 2024-10-03 RX ADMIN — PROPOFOL 80 MG: 10 INJECTION, EMULSION INTRAVENOUS at 11:59

## 2024-10-03 RX ADMIN — LIDOCAINE HYDROCHLORIDE 60 MG: 20 INJECTION, SOLUTION INFILTRATION; PERINEURAL at 11:59

## 2024-10-03 RX ADMIN — MORPHINE SULFATE 2 MG: 2 INJECTION, SOLUTION INTRAMUSCULAR; INTRAVENOUS at 20:29

## 2024-10-03 RX ADMIN — CEFEPIME 2000 MG: 2 INJECTION, POWDER, FOR SOLUTION INTRAVENOUS at 18:10

## 2024-10-03 NOTE — PLAN OF CARE
Problem: Adult Inpatient Plan of Care  Goal: Absence of Hospital-Acquired Illness or Injury  Outcome: Progressing  Intervention: Identify and Manage Fall Risk  Recent Flowsheet Documentation  Taken 10/3/2024 0400 by Raghu Cassidy RN  Safety Promotion/Fall Prevention:   safety round/check completed   room organization consistent   nonskid shoes/slippers when out of bed   lighting adjusted   fall prevention program maintained   clutter free environment maintained   assistive device/personal items within reach  Taken 10/3/2024 0200 by Raghu Cassidy RN  Safety Promotion/Fall Prevention:   safety round/check completed   room organization consistent   nonskid shoes/slippers when out of bed   lighting adjusted   fall prevention program maintained   clutter free environment maintained   assistive device/personal items within reach  Taken 10/3/2024 0000 by Raghu Cassidy RN  Safety Promotion/Fall Prevention:   safety round/check completed   room organization consistent   lighting adjusted   clutter free environment maintained   assistive device/personal items within reach   fall prevention program maintained  Taken 10/2/2024 2200 by Raghu Cassidy RN  Safety Promotion/Fall Prevention:   safety round/check completed   room organization consistent   lighting adjusted   fall prevention program maintained   clutter free environment maintained   assistive device/personal items within reach  Taken 10/2/2024 2058 by Raghu Cassidy RN  Safety Promotion/Fall Prevention:   safety round/check completed   room organization consistent   lighting adjusted   fall prevention program maintained   clutter free environment maintained   assistive device/personal items within reach  Intervention: Prevent Skin Injury  Recent Flowsheet Documentation  Taken 10/3/2024 0400 by Raghu Cassidy RN  Body Position: position changed independently  Taken 10/3/2024 0200 by Raghu Cassidy  JORDON Martinez  Body Position: position changed independently  Taken 10/3/2024 0000 by Raghu Cassidy RN  Body Position: position changed independently  Taken 10/2/2024 2200 by Raghu Cassidy RN  Body Position: position changed independently  Taken 10/2/2024 2058 by Raghu Cassidy RN  Body Position: position changed independently  Intervention: Prevent and Manage VTE (Venous Thromboembolism) Risk  Recent Flowsheet Documentation  Taken 10/3/2024 0400 by Raghu Cassidy RN  Activity Management: up ad kisha  Taken 10/3/2024 0200 by Raghu Cassidy RN  Activity Management: up ad kisha  Taken 10/3/2024 0000 by Raghu Cassidy RN  Activity Management: up ad kisha  Taken 10/2/2024 2200 by Raghu Cassidy RN  Activity Management: up ad kisha  Taken 10/2/2024 2058 by Raghu Cassidy RN  Activity Management: up ad kisha  VTE Prevention/Management: (Plavix) other (see comments)  Range of Motion: active ROM (range of motion) encouraged   Goal Outcome Evaluation:  Plan of Care Reviewed With: patient           Outcome Evaluation: AOx4, VSS, SR on telemetry monitor, room air, up ad kisha, MRI of right foot done, NPO after midnight, no c/o pain and discomfort, bed on lowest position, call light within reach.

## 2024-10-03 NOTE — OP NOTE
Operative Note  Date of Admission:  10/1/2024  OR Date: 10/3/2024  Location: [facility]    Pre-op Diagnosis: Osteomyelitis of right first toe    Post-op Diagnosis: Same    Procedure:   Left partial first ray amputation    Surgeon: Carter Silva MD    Assistant: Lavonne ESCOBAR, Provided critical assistance in exposure, retraction, and suction that overall decrease blood loss and operative time.    Anesthesia: Monitored Anesthesia Care    Staff:   Circulator: Lia Alberto RN; Munira Baer RN  Scrub Person: Damir Coreas  Assistant: Lavonne Paz CSA    Estimated Blood Loss: minimal    Specimen:   Order Name Source Comment Collection Info Order Time   ANAEROBIC CULTURE Toe, Right  Collected By: Carter Silva II, MD 10/3/2024 12:34 PM     Release to patient   Routine Release        TISSUE / BONE CULTURE Toe, Right  Collected By: Carter Silva II, MD 10/3/2024 12:34 PM     Release to patient   Routine Release        ANAEROBIC CULTURE Toe, Right  Collected By: Carter Silva II, MD 10/3/2024 12:41 PM     Release to patient   Routine Release        TISSUE / BONE CULTURE Toe, Right  Collected By: Carter Silva II, MD 10/3/2024 12:41 PM     Release to patient   Routine Release        TISSUE PATHOLOGY EXAM Toe, Right  Collected By: Carter Silva II, MD 10/3/2024 12:20 PM     Release to patient   Routine Release            Complications: None    Findings: Clear evidence of osteomyelitis in proximal and distal phalanx of right first toe but no purulence identified.  MTP joint appeared healthy.  Metatarsal head debrided and appeared healthy.    Implants: Nothing was implanted during the procedure    Indications:    The patient is an 59 y.o. male seen for evaluation for right first toe infection.  He has peripheral arterial disease and diabetes and previously had iliac stents placed by my partner Dr. Chiu.  He has good perfusion on exam but had worsening infection in his right first  toe with cellulitis.  MRI showed osteomyelitis of proximal and distal phalanx.  Plans made for right first toe amputation.  Details of this procedure including risks benefits and alternatives discussed with the patient and his wife and they verbalized understanding and agreed to proceed..       Procedure:    Patient is taken to the OR placed supine on the table.  Sedation was initiated by the anesthesia service.  The patient's right foot was prepped circumferentially with Betadine and he was draped in sterile fashion.  A timeout was performed.  I began procedure by infiltrating combination of 1% lidocaine and 0.25% Marcaine around the patient's right first toe and metatarsal head.  Once the patient's toe seemed adequately anesthetized I then used a 15 blade scalpel to make a circumferential incision around the base of the first toe.  I extended this incision medially over the distal aspect of the first metatarsal head.  Electrocautery is used to dissect down through subcutaneous tissue.  I dissected circumferentially around the proximal phalanx.  The end of phalangeal joint was clearly severely diseased and the distal phalanx and toe were easily removed.  This was passed off the field to be sent to pathology.  I then carried dissection around the proximal phalanx more proximally down to the metatarsal head.  Carried dissection circumferentially with electrocautery and scissors.  We eventually is able to fully dissect out the proximal phalanx and this was removed and a section of it was taken with rongeur to be sent for cultures.  The metatarsal head appeared healthy and there was no purulence in the metatarsal phalangeal joint.  Electrocautery was used for hemostasis.  I then used a rongeur to debride the metatarsal head back to facilitate wound closure.  The underlying bone bled very nicely and appeared healthy.  Once I had debrided sufficiently for wound closure I did use a rongeur to take a bone biopsy which was  sent to microbiology for culture.  I inspected the wound meticulously for hemostasis which was able to be obtained with electrocautery.  I did perform some undermining of the skin around the metatarsal head with Metzenbaum scissors to facilitate closure.  The wound was irrigated copiously with warm normal saline and the wound was closed with interrupted 3-0 Vicryl sutures in a deep layer and skin was closed with interrupted 2-0 nylon's in a vertical mattress fashion.  The incision was dressed with Xeroform gauze, dry gauze, Kerlix, ABD and Ace wrap.  The patient was then awakened from sedation and transported to PACU in stable condition.  The patient tolerated the procedure well and there were no intraoperative complications and all sponge instrument and needle counts were correct at the conclusion of the procedure.      Active Hospital Problems    Diagnosis  POA    **Osteomyelitis of great toe of right foot [M86.9]  Yes    Peripheral vascular disease [I73.9]  Yes    Atherosclerosis of native artery of extremity [I70.209]  Yes    Diabetic foot ulcer [E11.621, L97.509]  Yes    Type 2 diabetes mellitus [E11.9]  Yes      Resolved Hospital Problems   No resolved problems to display.      Carter Silva II, MD     Date: 10/3/2024  Time: 13:18 EDT

## 2024-10-03 NOTE — ANESTHESIA POSTPROCEDURE EVALUATION
Patient: Marbin Hawkins    Procedure Summary       Date: 10/03/24 Room / Location: [facility] OR [address] [place] [address] MAIN OR    Anesthesia Start: 1156 Anesthesia Stop: 1316    Procedure: AMPUTATION DIGIT - RIGHT FIRST TOE (Right) Diagnosis:       Osteomyelitis of great toe of right foot      Peripheral vascular disease      Type 2 diabetes mellitus without complication, without long-term current use of insulin      (Osteomyelitis of great toe of right foot [M86.9])      (Peripheral vascular disease [I73.9])      (Type 2 diabetes mellitus without complication, without long-term current use of insulin [E11.9])    Surgeons: Carter Silva II, MD Provider: Shilpi Krishnamurthy MD    Anesthesia Type: MAC ASA Status: 3            Anesthesia Type: MAC    Vitals  Vitals Value Taken Time   /66 10/03/24 1345   Temp 36.4 °C (97.5 °F) 10/03/24 1312   Pulse 65 10/03/24 1400   Resp 20 10/03/24 1345   SpO2 100 % 10/03/24 1400   Vitals shown include unfiled device data.        Post Anesthesia Care and Evaluation    Patient location during evaluation: bedside  Patient participation: complete - patient participated  Level of consciousness: awake and alert  Pain management: adequate    Airway patency: patent  Anesthetic complications: No anesthetic complications  PONV Status: controlled  Cardiovascular status: blood pressure returned to baseline and acceptable  Respiratory status: acceptable  Hydration status: acceptable

## 2024-10-03 NOTE — ANESTHESIA PREPROCEDURE EVALUATION
Anesthesia Evaluation     Patient summary reviewed                Airway   Mallampati: II  Dental      Comment: All teeth cap or implants.Risk of dental injury discussed with patient    Pulmonary - normal exam   (+) a smoker Former,  Cardiovascular - normal exam    ECG reviewed  PT is on anticoagulation therapy    (+) PVD, hyperlipidemia  (-) hypertension      Neuro/Psych- negative ROS  GI/Hepatic/Renal/Endo    (+) diabetes mellitus type 2 poorly controlled    Musculoskeletal     Abdominal    Substance History      OB/GYN          Other                    Anesthesia Plan    ASA 3     MAC       Anesthetic plan, risks, benefits, and alternatives have been provided, discussed and informed consent has been obtained with: patient.    CODE STATUS:    Code Status (Patient has no pulse and is not breathing): CPR (Attempt to Resuscitate)  Medical Interventions (Patient has pulse or is breathing): Full Support

## 2024-10-03 NOTE — PROGRESS NOTES
Name: Marbin Hawkins ADMIT: 10/1/2024   : 1965  PCP: Joyce Restrepo APRN    MRN: [id number] LOS: 2 days   AGE/SEX: 59 y.o. male  ROOM:  [facility] N630/1     Vascular Surgery Progress Note    Patient seen in his room this morning.  MRI reviewed.  He has osteomyelitis of the right first toe proximal and distal phalanx with cellulitis.  He will need this toe amputated.  Discussed this with the patient in his room, and patient's wife who was on the phone and they verbalized understanding.  Planning for right first toe amputation today.  Continue NPO for now.         Carter Silva II, MD  10/03/24  10:56 EDT  Office Number [phone]    [facility] Vascular Surgery

## 2024-10-04 LAB
ANION GAP SERPL CALCULATED.3IONS-SCNC: 8 MMOL/L (ref 5–15)
BUN SERPL-MCNC: 16 MG/DL (ref 6–20)
BUN/CREAT SERPL: 25.8 (ref 7–25)
CALCIUM SPEC-SCNC: 8.6 MG/DL (ref 8.6–10.5)
CHLORIDE SERPL-SCNC: 106 MMOL/L (ref 98–107)
CO2 SERPL-SCNC: 23 MMOL/L (ref 22–29)
CREAT SERPL-MCNC: 0.62 MG/DL (ref 0.76–1.27)
DEPRECATED RDW RBC AUTO: 36.4 FL (ref 37–54)
EGFRCR SERPLBLD CKD-EPI 2021: 110.8 ML/MIN/1.73
ERYTHROCYTE [DISTWIDTH] IN BLOOD BY AUTOMATED COUNT: 11.6 % (ref 12.3–15.4)
GLUCOSE BLDC GLUCOMTR-MCNC: 113 MG/DL (ref 70–130)
GLUCOSE BLDC GLUCOMTR-MCNC: 136 MG/DL (ref 70–130)
GLUCOSE BLDC GLUCOMTR-MCNC: 144 MG/DL (ref 70–130)
GLUCOSE BLDC GLUCOMTR-MCNC: 207 MG/DL (ref 70–130)
GLUCOSE SERPL-MCNC: 107 MG/DL (ref 65–99)
HCT VFR BLD AUTO: 37.5 % (ref 37.5–51)
HGB BLD-MCNC: 12.8 G/DL (ref 13–17.7)
MCH RBC QN AUTO: 30.5 PG (ref 26.6–33)
MCHC RBC AUTO-ENTMCNC: 34.1 G/DL (ref 31.5–35.7)
MCV RBC AUTO: 89.3 FL (ref 79–97)
PLATELET # BLD AUTO: 255 10*3/MM3 (ref 140–450)
PMV BLD AUTO: 9 FL (ref 6–12)
POTASSIUM SERPL-SCNC: 4.3 MMOL/L (ref 3.5–5.2)
RBC # BLD AUTO: 4.2 10*6/MM3 (ref 4.14–5.8)
SODIUM SERPL-SCNC: 137 MMOL/L (ref 136–145)
WBC NRBC COR # BLD AUTO: 8.13 10*3/MM3 (ref 3.4–10.8)

## 2024-10-04 PROCEDURE — 82948 REAGENT STRIP/BLOOD GLUCOSE: CPT

## 2024-10-04 PROCEDURE — 80048 BASIC METABOLIC PNL TOTAL CA: CPT | Performed by: STUDENT IN AN ORGANIZED HEALTH CARE EDUCATION/TRAINING PROGRAM

## 2024-10-04 PROCEDURE — 25810000003 SODIUM CHLORIDE 0.9 % SOLUTION: Performed by: STUDENT IN AN ORGANIZED HEALTH CARE EDUCATION/TRAINING PROGRAM

## 2024-10-04 PROCEDURE — 25010000002 VANCOMYCIN 10 G RECONSTITUTED SOLUTION: Performed by: STUDENT IN AN ORGANIZED HEALTH CARE EDUCATION/TRAINING PROGRAM

## 2024-10-04 PROCEDURE — 85027 COMPLETE CBC AUTOMATED: CPT | Performed by: STUDENT IN AN ORGANIZED HEALTH CARE EDUCATION/TRAINING PROGRAM

## 2024-10-04 PROCEDURE — 63710000001 INSULIN LISPRO (HUMAN) PER 5 UNITS: Performed by: STUDENT IN AN ORGANIZED HEALTH CARE EDUCATION/TRAINING PROGRAM

## 2024-10-04 PROCEDURE — 25010000002 MORPHINE PER 10 MG: Performed by: STUDENT IN AN ORGANIZED HEALTH CARE EDUCATION/TRAINING PROGRAM

## 2024-10-04 PROCEDURE — 99223 1ST HOSP IP/OBS HIGH 75: CPT | Performed by: INTERNAL MEDICINE

## 2024-10-04 PROCEDURE — 25010000002 CEFEPIME PER 500 MG: Performed by: STUDENT IN AN ORGANIZED HEALTH CARE EDUCATION/TRAINING PROGRAM

## 2024-10-04 PROCEDURE — 99024 POSTOP FOLLOW-UP VISIT: CPT | Performed by: STUDENT IN AN ORGANIZED HEALTH CARE EDUCATION/TRAINING PROGRAM

## 2024-10-04 RX ADMIN — CEFEPIME 2000 MG: 2 INJECTION, POWDER, FOR SOLUTION INTRAVENOUS at 02:27

## 2024-10-04 RX ADMIN — GLIPIZIDE 5 MG: 5 TABLET ORAL at 06:39

## 2024-10-04 RX ADMIN — OXYCODONE HYDROCHLORIDE 10 MG: 10 TABLET ORAL at 11:50

## 2024-10-04 RX ADMIN — METFORMIN HYDROCHLORIDE 500 MG: 500 TABLET, FILM COATED ORAL at 09:11

## 2024-10-04 RX ADMIN — Medication 10 ML: at 20:14

## 2024-10-04 RX ADMIN — MORPHINE SULFATE 2 MG: 2 INJECTION, SOLUTION INTRAMUSCULAR; INTRAVENOUS at 09:19

## 2024-10-04 RX ADMIN — CEFEPIME 2000 MG: 2 INJECTION, POWDER, FOR SOLUTION INTRAVENOUS at 11:42

## 2024-10-04 RX ADMIN — VANCOMYCIN HYDROCHLORIDE 1750 MG: 10 INJECTION, POWDER, LYOPHILIZED, FOR SOLUTION INTRAVENOUS at 00:26

## 2024-10-04 RX ADMIN — CEFEPIME 2000 MG: 2 INJECTION, POWDER, FOR SOLUTION INTRAVENOUS at 20:14

## 2024-10-04 RX ADMIN — OXYCODONE HYDROCHLORIDE 10 MG: 10 TABLET ORAL at 02:24

## 2024-10-04 RX ADMIN — INSULIN LISPRO 3 UNITS: 100 INJECTION, SOLUTION INTRAVENOUS; SUBCUTANEOUS at 17:44

## 2024-10-04 RX ADMIN — MORPHINE SULFATE 2 MG: 2 INJECTION, SOLUTION INTRAMUSCULAR; INTRAVENOUS at 00:29

## 2024-10-04 RX ADMIN — VANCOMYCIN HYDROCHLORIDE 1750 MG: 10 INJECTION, POWDER, LYOPHILIZED, FOR SOLUTION INTRAVENOUS at 11:50

## 2024-10-04 RX ADMIN — MORPHINE SULFATE 2 MG: 2 INJECTION, SOLUTION INTRAMUSCULAR; INTRAVENOUS at 20:20

## 2024-10-04 RX ADMIN — OXYCODONE HYDROCHLORIDE 10 MG: 10 TABLET ORAL at 06:38

## 2024-10-04 RX ADMIN — MORPHINE SULFATE 2 MG: 2 INJECTION, SOLUTION INTRAMUSCULAR; INTRAVENOUS at 16:07

## 2024-10-04 RX ADMIN — OXYCODONE HYDROCHLORIDE 10 MG: 10 TABLET ORAL at 22:18

## 2024-10-04 RX ADMIN — METFORMIN HYDROCHLORIDE 500 MG: 500 TABLET, FILM COATED ORAL at 17:44

## 2024-10-04 RX ADMIN — CLOPIDOGREL BISULFATE 75 MG: 75 TABLET, FILM COATED ORAL at 09:11

## 2024-10-04 RX ADMIN — Medication 10 ML: at 09:11

## 2024-10-04 RX ADMIN — GLIPIZIDE 5 MG: 5 TABLET ORAL at 17:44

## 2024-10-04 NOTE — PLAN OF CARE
Goal Outcome Evaluation:  Plan of Care Reviewed With: patient        Progress: improving    Problem: Adult Inpatient Plan of Care  Goal: Absence of Hospital-Acquired Illness or Injury  Outcome: Progressing  Intervention: Identify and Manage Fall Risk  Recent Flowsheet Documentation  Taken 10/4/2024 0400 by Raghu Cassidy RN  Safety Promotion/Fall Prevention:   safety round/check completed   room organization consistent   nonskid shoes/slippers when out of bed   lighting adjusted   fall prevention program maintained   assistive device/personal items within reach   clutter free environment maintained  Taken 10/4/2024 0224 by Raghu Cassidy RN  Safety Promotion/Fall Prevention:   safety round/check completed   room organization consistent   nonskid shoes/slippers when out of bed   lighting adjusted   fall prevention program maintained   clutter free environment maintained   assistive device/personal items within reach  Taken 10/4/2024 0029 by Raghu Cassidy RN  Safety Promotion/Fall Prevention:   safety round/check completed   room organization consistent   nonskid shoes/slippers when out of bed   lighting adjusted   fall prevention program maintained   clutter free environment maintained   assistive device/personal items within reach  Taken 10/3/2024 2219 by Raghu Cassidy RN  Safety Promotion/Fall Prevention:   safety round/check completed   room organization consistent   nonskid shoes/slippers when out of bed   lighting adjusted   fall prevention program maintained   clutter free environment maintained   assistive device/personal items within reach  Taken 10/3/2024 2200 by Raghu Cassidy RN  Safety Promotion/Fall Prevention:   safety round/check completed   room organization consistent   nonskid shoes/slippers when out of bed   lighting adjusted   fall prevention program maintained   clutter free environment maintained   assistive device/personal items within  reach  Taken 10/3/2024 2029 by Raghu Cassidy RN  Safety Promotion/Fall Prevention:   safety round/check completed   room organization consistent   nonskid shoes/slippers when out of bed   lighting adjusted   fall prevention program maintained   clutter free environment maintained   assistive device/personal items within reach  Intervention: Prevent Skin Injury  Recent Flowsheet Documentation  Taken 10/4/2024 0400 by Raghu Cassidy RN  Body Position: position changed independently  Taken 10/4/2024 0224 by Raghu Cassidy RN  Body Position: position changed independently  Taken 10/4/2024 0029 by Raghu Cassidy RN  Body Position: position changed independently  Taken 10/3/2024 2219 by Raghu Cassidy RN  Body Position: position changed independently  Taken 10/3/2024 2200 by Raghu Cassidy RN  Body Position: position changed independently  Taken 10/3/2024 2029 by Raghu Cassidy RN  Body Position: position changed independently  Intervention: Prevent and Manage VTE (Venous Thromboembolism) Risk  Recent Flowsheet Documentation  Taken 10/4/2024 0400 by Raghu Cassidy RN  Activity Management: up ad kisha  Taken 10/4/2024 0224 by Raghu Cassidy RN  Activity Management: up ad kisha  Taken 10/4/2024 0029 by Raghu Cassidy RN  Activity Management: up ad kisha  VTE Prevention/Management: (Plavix) other (see comments)  Taken 10/3/2024 2219 by Raghu Cassidy RN  Activity Management: up ad kisha  Taken 10/3/2024 2200 by Raghu Cassidy RN  Activity Management: up ad kisha  Taken 10/3/2024 2029 by Raghu Cassidy RN  Activity Management: up ad kisha  VTE Prevention/Management: (Plavix) other (see comments)  Range of Motion: active ROM (range of motion) encouraged

## 2024-10-04 NOTE — PROGRESS NOTES
"[facility] Clinical Pharmacy Services: Vancomycin Monitoring Note    Marbin Hawkins is a 59 y.o. male who is on day 3/7 of pharmacy to dose vancomycin for Bone and/or Joint Infection.    Previous Vancomycin Dose:    1250 mg IV every  12  hours  Updated Cultures and Sensitivities:   10/1 BloodCx - NGTD 2/2  10/1 MRSA Screen - negative  10/3 RToeCx - pending  10/3 RMetatarsalCx - pending  Results from last 7 days   Lab Units 10/03/24  2127   VANCOMYCIN TR mcg/mL 7.20     Vitals/Labs  Ht: 203.2 cm (80\"); Wt: 121 kg (266 lb 12.1 oz)   Temp Readings from Last 1 Encounters:   10/03/24 97.5 °F (36.4 °C) (Oral)     Estimated Creatinine Clearance: 208.1 mL/min (A) (by C-G formula based on SCr of 0.58 mg/dL (L)).       Results from last 7 days   Lab Units 10/02/24  0444 10/01/24  1959   CREATININE mg/dL 0.58* 0.79   WBC 10*3/mm3 6.13 6.80     Assessment/Plan  Trough came back low - will increase dose    Current Vancomycin Dose: 1750 mg IV every  12  hours; provides a predicted  mg/L.hr   No vanc levels ordered at this time - will defer to AM clinical  We will continue to monitor patient changes and renal function     Thank you for involving pharmacy in this patient's care. Please contact pharmacy with any questions or concerns.       Juan Gomez, PharmD            "

## 2024-10-04 NOTE — PROGRESS NOTES
"    DAILY PROGRESS NOTE  [facility]    Patient Identification:  Name: Marbin Hawkins  Age: 59 y.o.  Sex: male  :  1965  MRN: [id number]         Primary Care Physician: Joyce Restrepo APRN    Subjective:  Interval History: Having some pain postoperatively.  No nausea no vomiting no confusion no fever    Objective: Resting comfortably in bed conversational pleasant.  No family at bedside    Scheduled Meds:cefepime, 2,000 mg, Intravenous, Q8H  clopidogrel, 75 mg, Oral, Daily  glipizide, 5 mg, Oral, BID AC  insulin lispro, 2-7 Units, Subcutaneous, 4x Daily AC & at Bedtime  metFORMIN, 500 mg, Oral, BID With Meals  sodium chloride, 10 mL, Intravenous, Q12H  vancomycin, 1,750 mg, Intravenous, Q12H      Continuous Infusions:lactated ringers, 9 mL/hr, Last Rate: 9 mL/hr (10/03/24 1156)  Pharmacy to dose vancomycin,         Vital signs in last 24 hours:  Temp:  [97.3 °F (36.3 °C)-98.4 °F (36.9 °C)] 98.2 °F (36.8 °C)  Heart Rate:  [63-89] 75  Resp:  [16-20] 17  BP: (102-123)/(62-93) 111/87    Intake/Output:    Intake/Output Summary (Last 24 hours) at 10/4/2024 0940  Last data filed at 10/4/2024 0633  Gross per 24 hour   Intake 840 ml   Output 3350 ml   Net -2510 ml       Exam:  /87 (BP Location: Right arm, Patient Position: Lying)   Pulse 75   Temp 98.2 °F (36.8 °C) (Oral)   Resp 17   Ht 203.2 cm (80\")   Wt 121 kg (266 lb 12.1 oz)   SpO2 98%   BMI 29.30 kg/m²     General Appearance:    Alert, cooperative, nontoxic, AAOx3                         Throat:   Oral mucosa pink and moist                           Neck:   No JVD                         Lungs:    Clear to auscultation bilaterally, respirations unlabored                          Heart:    Regular rate and rhythm, S1 and S2 normal                  Abdomen:     Soft, nontender, bowel sounds active                 Extremities: Right foot surgically wrapped, no signs of ascending infection                          Data Review:  Labs in " chart were reviewed.    Assessment:  Active Hospital Problems    Diagnosis  POA    **Osteomyelitis of great toe of right foot [M86.9]  Yes    Peripheral vascular disease [I73.9]  Yes    Atherosclerosis of native artery of extremity [I70.209]  Yes    Diabetic foot ulcer [E11.621, L97.509]  Yes    Type 2 diabetes mellitus [E11.9]  Yes      Resolved Hospital Problems   No resolved problems to display.       Plan:    Status post left partial first ray amputation secondary to osteomyelitis of the right great toe per Dr. Silva on 10/3/2024   -Patient and spouse had a lot of wound care questions and I defer that to surgery   -Very minimal postoperative acute blood loss anemia with Hgb at 12.8 and no need to trend this daily   -Requiring p.o. and IV pain medication for postoperative pain control    IV vancomycin and cefepime and will adjust pending operative cultures   -ID consultation placed    DM2 with circular disorder/PVD with previous bilateral iliac stents (Plavix) with an A1c of 5.9   -Glipizide/metformin/SSI        Disposition -await final surgical recommendations and anticipate patient could either go home over the weekend or early next workweek    Gato Camarillo MD  10/4/2024  09:40 EDT

## 2024-10-04 NOTE — CONSULTS
Referring Provider: Rickey Red MD  [address]  [address] [address]  Reason for Consultation: Concern for osteomyelitis of the foot    Subjective   History of present illness: This is a 59-year-old male with a history of peripheral vascular disease and type 2 diabetes who was admitted on October 1 with concerns for right foot infection.  The patient was last hospitalized from August 24 through 27 with a diabetic foot ulcer.   At that time MRI did not reveal any evidence of osteomyelitis.  Wound cultures grew Pseudomonas and Enterococcus faecalis.  He was treated with vancomycin and Zosyn in the hospital and transitioned to Keflex 500 mg p.o. 4 times daily.He underwent angioplasty of the left common iliac artery with stent placement as well as sharp excisional debridement of the right great toe on August 29.  Apparently he was doing well until the last few days prior to admission when he started developing erythema and swelling of the right foot and ankle.  He was seen at immediate care and was started on ciprofloxacin.  He denied any fevers or chills.  Imaging revealed osteomyelitis of the right great toe.  Vascular surgery was contacted and the patient underwent a left partial first ray amputation on October 1.  A biopsy of the healthy appearing metatarsal head was obtained.  The patient has been on empiric vancomycin and cefepime    Past Medical History:   Diagnosis Date    Diabetes mellitus    Peripheral vascular disease    Past Surgical History:   Procedure Laterality Date    AMPUTATION DIGIT Right 10/3/2024    Procedure: AMPUTATION DIGIT - RIGHT FIRST TOE;  Surgeon: Carter Silva II, MD;  Location: [facility];  Service: Vascular;  Laterality: Right;    APPENDECTOMY      ARTERIOGRAM LOWER EXTREMITY WITH ANGIOPLASTY STENT Bilateral 8/29/2024    Procedure: BILATERAL LOWER EXTREMITY ARTERIOGRAM WITH BILATERAL ILIAC ARTERY ANGIOPLASTY AND STENT placement; right great toe debridement;   Surgeon: Jes Chiu Jr., MD;  Location: [facility] OR;  Service: Vascular;  Laterality: Bilateral;    CARPAL TUNNEL RELEASE Left     INCISION AND DRAINAGE LEG Right 8/29/2024    Procedure: right great toe debridement;  Surgeon: Jes Chiu Jr., MD;  Location: [facility] OR;  Service: Vascular;  Laterality: Right;        reports that he has quit smoking. His smoking use included cigarettes. He has a 105 pack-year smoking history. He has never used smokeless tobacco. He reports that he does not drink alcohol and does not use drugs.    family history includes Alzheimer's disease in his mother; Heart attack in his father; Lung cancer in his brother; No Known Problems in his brother and brother; Uterine cancer in his sister.    No Known Allergies    Medication:  Antibiotics:  Cefepime 2 g IV every 8 hours   vancomycin dosing per pharmacy      Please refer to the medical record for a full medication list    Review of Systems  Pertinent items are noted in HPI, all other systems reviewed and negative    Objective   Vital Signs   Temp:  [97.3 °F (36.3 °C)-98.2 °F (36.8 °C)] 98.2 °F (36.8 °C)  Heart Rate:  [63-89] 75  Resp:  [16-20] 17  BP: (102-123)/(66-93) 111/87    Physical Exam:   General: In no acute distress  HEENT: Normocephalic, atraumatic, no scleral icterus.   Neck: Supple, trachea is midline  Cardiovascular: Normal rate, regular rhythm, normal S1 and S2, no murmurs, rubs, or gallops   Respiratory: Lungs are clear to auscultation bilaterally, no wheezing  GI: Abdomen is soft, nontender, nondistended, positive bowel sounds bilaterally  Musculoskeletal:  no edema, tenderness or deformity  Skin: No rashes right foot covered in dressing  Extremities: No E/C/C  Neurological: Alert and oriented, moving all 4 extremities  Psychiatric: Normal mood and affect     Results Review:   I reviewed the patient's new clinical results.  I reviewed the patient's new imaging results and agree with the  interpretation.    Lab Results   Component Value Date    WBC 8.13 10/04/2024    HGB 12.8 (L) 10/04/2024    HCT 37.5 10/04/2024    MCV 89.3 10/04/2024     10/04/2024       Lab Results   Component Value Date    GLUCOSE 107 (H) 10/04/2024    BUN 16 10/04/2024    CREATININE 0.62 (L) 10/04/2024    EGFRIFNONA 113 12/13/2021    BCR 25.8 (H) 10/04/2024    CO2 23.0 10/04/2024    CALCIUM 8.6 10/04/2024    ALBUMIN 3.5 10/02/2024    AST 12 10/02/2024    ALT 12 10/02/2024     Procalcitonin 0.03  Hemoglobin A1c 5.9    Microbiology:  10/3 OR cx R toe P  10/1 nasal MRSA PCR negative  10/1 BCx NGTD x 2    Radiology:  MRI of the foot shows septic arthritis of the first IP joint with osteomyelitis of the first proximal and distal phalanges.  Plantar wound communicates with the first IP joint.  Forefoot cellulitis greatest within the great toe.    Assessment & Plan   First right proximal and distal phalanges osteomyelitis with first IP joint septic arthritis status post first ray amputation on October 1  Type 2 diabetes complicating above    Based on the operative note all infected bone has been removed.  Metatarsal head was debrided but had no signs of infection.  A culture was obtained.  As long as the culture comes back negative and the patient will only need a few days of postoperative antibiotic to cover skin and soft tissue infection.  If the bone culture comes back positive I would recommend a 6-week course of IV antibiotics.  For now continue vancomycin and cefepime.  Follow-up culture    I discussed the patient's findings and my recommendations with patient, family, and nursing staff

## 2024-10-04 NOTE — PROGRESS NOTES
Name: Marbin Hawkins ADMIT: 10/1/2024   : 1965  PCP: Joyce Restrepo APRN    MRN: [id number] LOS: 3 days   AGE/SEX: 59 y.o. male  ROOM:  [place] [address]/     CC: POD#1 sp R first toe amputation    Interval History: No acute events overnight.  Pain controlled.  Dressing in place with no strikethrough.  Febrile  Subjective   Subjective     Review of Systems  Objective   Objective     Vitals:   Temp:  [97.5 °F (36.4 °C)-98.2 °F (36.8 °C)] 98.2 °F (36.8 °C)  Heart Rate:  [75-89] 85  Resp:  [17-18] 17  BP: (111-114)/(70-92) 113/92    I/O this shift:  In: -   Out: 600 [Urine:600]    Scheduled Meds:     cefepime, 2,000 mg, Intravenous, Q8H  clopidogrel, 75 mg, Oral, Daily  glipizide, 5 mg, Oral, BID AC  insulin lispro, 2-7 Units, Subcutaneous, 4x Daily AC & at Bedtime  metFORMIN, 500 mg, Oral, BID With Meals  sodium chloride, 10 mL, Intravenous, Q12H  vancomycin, 1,750 mg, Intravenous, Q12H      IV Meds:   Pharmacy to dose vancomycin,         Physical Exam  Palpable popliteal pulse.  Dressing in place on right foot.  No strikethrough on dressing      Data Reviewed:  CBC    Results from last 7 days   Lab Units 10/04/24  0546 10/02/24  0444 10/01/24  1959   WBC 10*3/mm3 8.13 6.13 6.80   HEMOGLOBIN g/dL 12.8* 12.6* 12.3*   PLATELETS 10*3/mm3 255 230 246     BMP   Results from last 7 days   Lab Units 10/04/24  0546 10/02/24  0444 10/01/24  1959   SODIUM mmol/L 137 136 139   POTASSIUM mmol/L 4.3 4.1 4.0   CHLORIDE mmol/L 106 104 103   CO2 mmol/L 23.0 21.2* 24.4   BUN mg/dL 16 12 14   CREATININE mg/dL 0.62* 0.58* 0.79   GLUCOSE mg/dL 107* 85 102*     Cr Clearance Estimated Creatinine Clearance: 194.7 mL/min (A) (by C-G formula based on SCr of 0.62 mg/dL (L)).  Coa     HbA1C   Lab Results   Component Value Date    HGBA1C 5.90 (H) 10/02/2024    HGBA1C 5.90 (H) 2024    HGBA1C 10.7 (H) 2024     Blood Glucose   Glucose   Date/Time Value Ref Range Status   10/04/2024 1128 144 (H) 70 - 130 mg/dL Final  "  10/04/2024 0606 136 (H) 70 - 130 mg/dL Final   10/03/2024 2027 120 70 - 130 mg/dL Final   10/03/2024 1613 147 (H) 70 - 130 mg/dL Final   10/03/2024 1503 142 (H) 70 - 130 mg/dL Final   10/03/2024 1337 89 70 - 130 mg/dL Final   10/03/2024 0610 113 70 - 130 mg/dL Final   10/02/2024 2030 156 (H) 70 - 130 mg/dL Final     Infection   Results from last 7 days   Lab Units 10/01/24  1959   BLOODCX  No growth at 2 days  No growth at 2 days   PROCALCITONIN ng/mL 0.03     CMP   Results from last 7 days   Lab Units 10/04/24  0546 10/02/24  0444 10/01/24  1959   SODIUM mmol/L 137 136 139   POTASSIUM mmol/L 4.3 4.1 4.0   CHLORIDE mmol/L 106 104 103   CO2 mmol/L 23.0 21.2* 24.4   BUN mg/dL 16 12 14   CREATININE mg/dL 0.62* 0.58* 0.79   GLUCOSE mg/dL 107* 85 102*   ALBUMIN g/dL  --  3.5 3.6   BILIRUBIN mg/dL  --  0.4 0.4   ALK PHOS U/L  --  71 78   AST (SGOT) U/L  --  12 14   ALT (SGPT) U/L  --  12 11     ABG      UA      LISSA  No results found for: \"POCMETH\", \"POCAMPHET\", \"POCBARBITUR\", \"POCBENZO\", \"POCCOCAINE\", \"POCOPIATES\", \"POCOXYCODO\", \"POCPHENCYC\", \"POCPROPOXY\", \"POCTHC\", \"POCTRICYC\"  Lysis Labs   Results from last 7 days   Lab Units 10/04/24  0546 10/02/24  0444 10/01/24  1959   HEMOGLOBIN g/dL 12.8* 12.6* 12.3*   PLATELETS 10*3/mm3 255 230 246   CREATININE mg/dL 0.62* 0.58* 0.79     Radiology(recent) MRI Foot Right With & Without Contrast    Result Date: 10/3/2024  1. Septic arthritis 1st IP joint with osteomyelitis of the 1st proximal and distal phalanges. Plantar wound communicates with the 1st IP joint. Forefoot cellulitis greatest within the great toe. Generalized muscular edema and atrophy related to chronic neuropathy and potential myositis.  This report was finalized on 10/3/2024 7:36 AM by Kapil Elizondo M.D on Workstation: [id number]       Most notable findings include: Culture still pending.  Path still pending.  No leukocytosis.    Active Hospital Problems:   Active Hospital Problems    Diagnosis  POA    " **Osteomyelitis of great toe of right foot [M86.9]  Yes    Peripheral vascular disease [I73.9]  Yes    Atherosclerosis of native artery of extremity [I70.209]  Yes    Diabetic foot ulcer [E11.621, L97.509]  Yes    Type 2 diabetes mellitus [E11.9]  Yes      Resolved Hospital Problems   No resolved problems to display.      Assessment & Plan     Assessment / Plan     59-year-old gentleman with peripheral arterial disease status post bilateral iliac stents, diabetes, right first toe osteomyelitis who is now status post right partial first ray resection on 10/3/2024.  Doing well overall.  Infectious disease recommendations reviewed and appreciated.  Will follow-up cultures.  [employer] consulted for offloading shoe.  There was not one at bedside this morning.  Will follow-up on this.  Keep nonweightbearing today until he has an appropriate shoe.  Will take dressing down tomorrow.  Supportive care per primary team.    Carter Silva II, MD  10/04/24  15:47 EDT  Available via Secure Chat during the day for non-urgent issues.  After hours and for urgent issues please call the office at [phone]

## 2024-10-05 LAB
CYTO UR: NORMAL
GLUCOSE BLDC GLUCOMTR-MCNC: 135 MG/DL (ref 70–130)
GLUCOSE BLDC GLUCOMTR-MCNC: 135 MG/DL (ref 70–130)
GLUCOSE BLDC GLUCOMTR-MCNC: 138 MG/DL (ref 70–130)
GLUCOSE BLDC GLUCOMTR-MCNC: 165 MG/DL (ref 70–130)
LAB AP CASE REPORT: NORMAL
PATH REPORT.FINAL DX SPEC: NORMAL
PATH REPORT.GROSS SPEC: NORMAL

## 2024-10-05 PROCEDURE — 82948 REAGENT STRIP/BLOOD GLUCOSE: CPT

## 2024-10-05 PROCEDURE — 25010000002 CEFEPIME PER 500 MG: Performed by: STUDENT IN AN ORGANIZED HEALTH CARE EDUCATION/TRAINING PROGRAM

## 2024-10-05 PROCEDURE — 63710000001 INSULIN LISPRO (HUMAN) PER 5 UNITS: Performed by: STUDENT IN AN ORGANIZED HEALTH CARE EDUCATION/TRAINING PROGRAM

## 2024-10-05 PROCEDURE — 25010000002 VANCOMYCIN 10 G RECONSTITUTED SOLUTION: Performed by: STUDENT IN AN ORGANIZED HEALTH CARE EDUCATION/TRAINING PROGRAM

## 2024-10-05 PROCEDURE — 25810000003 SODIUM CHLORIDE 0.9 % SOLUTION: Performed by: STUDENT IN AN ORGANIZED HEALTH CARE EDUCATION/TRAINING PROGRAM

## 2024-10-05 PROCEDURE — 99024 POSTOP FOLLOW-UP VISIT: CPT | Performed by: STUDENT IN AN ORGANIZED HEALTH CARE EDUCATION/TRAINING PROGRAM

## 2024-10-05 RX ADMIN — OXYCODONE HYDROCHLORIDE 10 MG: 10 TABLET ORAL at 12:41

## 2024-10-05 RX ADMIN — OXYCODONE HYDROCHLORIDE 5 MG: 5 TABLET ORAL at 08:09

## 2024-10-05 RX ADMIN — METFORMIN HYDROCHLORIDE 500 MG: 500 TABLET, FILM COATED ORAL at 08:09

## 2024-10-05 RX ADMIN — CLOPIDOGREL BISULFATE 75 MG: 75 TABLET, FILM COATED ORAL at 08:09

## 2024-10-05 RX ADMIN — CEFEPIME 2000 MG: 2 INJECTION, POWDER, FOR SOLUTION INTRAVENOUS at 12:37

## 2024-10-05 RX ADMIN — Medication 10 ML: at 08:12

## 2024-10-05 RX ADMIN — OXYCODONE HYDROCHLORIDE 10 MG: 10 TABLET ORAL at 18:42

## 2024-10-05 RX ADMIN — METFORMIN HYDROCHLORIDE 500 MG: 500 TABLET, FILM COATED ORAL at 17:01

## 2024-10-05 RX ADMIN — GLIPIZIDE 5 MG: 5 TABLET ORAL at 08:09

## 2024-10-05 RX ADMIN — VANCOMYCIN HYDROCHLORIDE 1750 MG: 10 INJECTION, POWDER, LYOPHILIZED, FOR SOLUTION INTRAVENOUS at 00:18

## 2024-10-05 RX ADMIN — OXYCODONE HYDROCHLORIDE 10 MG: 10 TABLET ORAL at 23:35

## 2024-10-05 RX ADMIN — VANCOMYCIN HYDROCHLORIDE 1750 MG: 10 INJECTION, POWDER, LYOPHILIZED, FOR SOLUTION INTRAVENOUS at 11:08

## 2024-10-05 RX ADMIN — CEFEPIME 2000 MG: 2 INJECTION, POWDER, FOR SOLUTION INTRAVENOUS at 19:43

## 2024-10-05 RX ADMIN — GLIPIZIDE 5 MG: 5 TABLET ORAL at 17:01

## 2024-10-05 RX ADMIN — INSULIN LISPRO 2 UNITS: 100 INJECTION, SOLUTION INTRAVENOUS; SUBCUTANEOUS at 12:37

## 2024-10-05 RX ADMIN — CEFEPIME 2000 MG: 2 INJECTION, POWDER, FOR SOLUTION INTRAVENOUS at 02:23

## 2024-10-05 NOTE — PLAN OF CARE
Goal Outcome Evaluation:              Outcome Evaluation: resting comfortably.  Oral pain meds prn with stated relief.  Continue IV antibiotics

## 2024-10-05 NOTE — DISCHARGE INSTRUCTIONS
Keep right toe amputation site covered with dry gauze, Kerlix, Ace wrap.  Change dressing daily and treat with Betadine to incision.  Okay to ambulate with postop shoe.  Avoid strenuous activity, heavy lifting, running, driving etc.

## 2024-10-05 NOTE — PROGRESS NOTES
"    DAILY PROGRESS NOTE  [facility]    Patient Identification:  Name: Marbin Hawkins  Age: 59 y.o.  Sex: male  :  1965  MRN: [id number]         Primary Care Physician: Joyce Restrepo APRN    Subjective:  Interval History: Pain control better but he still requiring IV morphine.  No nausea no vomiting no chest pain troubles breathing or fever.  Dr. Silva was in the room addressing the wound and cleaning it and changing out bandaging at bedside.  Case discussed.    Objective: Patient calm and conversational.  Nontoxic.    Scheduled Meds:cefepime, 2,000 mg, Intravenous, Q8H  clopidogrel, 75 mg, Oral, Daily  glipizide, 5 mg, Oral, BID AC  insulin lispro, 2-7 Units, Subcutaneous, 4x Daily AC & at Bedtime  metFORMIN, 500 mg, Oral, BID With Meals  sodium chloride, 10 mL, Intravenous, Q12H  vancomycin, 1,750 mg, Intravenous, Q12H      Continuous Infusions:Pharmacy to dose vancomycin,         Vital signs in last 24 hours:  Temp:  [98.2 °F (36.8 °C)-98.6 °F (37 °C)] 98.2 °F (36.8 °C)  Heart Rate:  [81-89] 81  Resp:  [16-17] 16  BP: ()/(57-92) 96/57    Intake/Output:    Intake/Output Summary (Last 24 hours) at 10/5/2024 1113  Last data filed at 10/5/2024 1111  Gross per 24 hour   Intake 650 ml   Output 3475 ml   Net -2825 ml       Exam:  BP 96/57 (BP Location: Right arm, Patient Position: Lying)   Pulse 81   Temp 98.2 °F (36.8 °C) (Oral)   Resp 16   Ht 203.2 cm (80\")   Wt 121 kg (266 lb 12.1 oz)   SpO2 98%   BMI 29.30 kg/m²     General Appearance:    Alert, cooperative, AAOx3                         Throat:   Oral mucosa pink and moist                           Neck:   No JVD                         Lungs:    Clear to auscultation bilaterally, respirations unlabored                          Heart:    Regular rate and rhythm, S1 and S2 normal                  Abdomen:     Soft, nontender, bowel sounds active                 Extremities: Right foot wound/amputation looks good with no secondary " persistent infection.  No ascending infection noted in that extremity                           Data Review:  Labs in chart were reviewed.    Assessment:  Active Hospital Problems    Diagnosis  POA    **Osteomyelitis of great toe of right foot [M86.9]  Yes    Peripheral vascular disease [I73.9]  Yes    Atherosclerosis of native artery of extremity [I70.209]  Yes    Diabetic foot ulcer [E11.621, L97.509]  Yes    Type 2 diabetes mellitus [E11.9]  Yes      Resolved Hospital Problems   No resolved problems to display.       Plan:    Status post left partial first ray amputation secondary to osteomyelitis of the right great toe per Dr. Silva on 10/3/2024              -Case discussed with Dr. Silva at bedside today and appreciate his surgical and wound care assistance              -Very minimal postoperative acute blood loss anemia with Hgb at 12.8               -Pain control improving but patient is still utilizing IV morphine so I have discontinued IV pain medications and will ensure patient's pain can adequately be controlled on p.o. route prior to DC   -Remains on IV vancomycin and cefepime.  Blood culture showed no growth to date and intraoperative wound cultures are showing no growth at 2 days so I am awaiting final ID recommendations but it appears patient will not require long-term antibiotics       DM2 with circular disorder/PVD with previous bilateral iliac stents (Plavix) with an A1c of 5.9              -Glipizide/metformin/SSI           Disposition -anticipating discharge tomorrow    Gato Camarillo MD  10/5/2024  11:13 EDT

## 2024-10-05 NOTE — PLAN OF CARE
Goal Outcome Evaluation:  Plan of Care Reviewed With: patient        Progress: improving  Outcome Evaluation: Voiding per urinal.  Pain meds per MAR.  No orders for dressing change-MD to do in AM.  Post op shoe in room.

## 2024-10-05 NOTE — PROGRESS NOTES
Name: Marbin Hawkins ADMIT: 10/1/2024   : 1965  PCP: Joyce Restrepo APRN    MRN: [id number] LOS: 4 days   AGE/SEX: 59 y.o. male  ROOM:  [place] [address]/     CC: POD#2 sp R first toe amputation    Interval History: No acute events overnight.  Pain controlled.  Vision healing okay.  Afebrile.   Subjective   Subjective     Review of Systems  Objective   Objective     Vitals:   Temp:  [98.2 °F (36.8 °C)-98.6 °F (37 °C)] 98.6 °F (37 °C)  Heart Rate:  [81-89] 84  Resp:  [16-17] 16  BP: ()/(57-78) 127/78    I/O this shift:  In: -   Out: 400 [Urine:400]    Scheduled Meds:     cefepime, 2,000 mg, Intravenous, Q8H  clopidogrel, 75 mg, Oral, Daily  glipizide, 5 mg, Oral, BID AC  insulin lispro, 2-7 Units, Subcutaneous, 4x Daily AC & at Bedtime  metFORMIN, 500 mg, Oral, BID With Meals  sodium chloride, 10 mL, Intravenous, Q12H  vancomycin, 1,750 mg, Intravenous, Q12H      IV Meds:   Pharmacy to dose vancomycin,         Physical Exam  Incision intact with nylon sutures.  Minimal erythema.  Appears viable.    Data Reviewed:  CBC    Results from last 7 days   Lab Units 10/04/24  0546 10/02/24  0444 10/01/24  1959   WBC 10*3/mm3 8.13 6.13 6.80   HEMOGLOBIN g/dL 12.8* 12.6* 12.3*   PLATELETS 10*3/mm3 255 230 246     BMP   Results from last 7 days   Lab Units 10/04/24  0546 10/02/24  0444 10/01/24  1959   SODIUM mmol/L 137 136 139   POTASSIUM mmol/L 4.3 4.1 4.0   CHLORIDE mmol/L 106 104 103   CO2 mmol/L 23.0 21.2* 24.4   BUN mg/dL 16 12 14   CREATININE mg/dL 0.62* 0.58* 0.79   GLUCOSE mg/dL 107* 85 102*     Cr Clearance Estimated Creatinine Clearance: 194.7 mL/min (A) (by C-G formula based on SCr of 0.62 mg/dL (L)).  Coag     HbA1C   Lab Results   Component Value Date    HGBA1C 5.90 (H) 10/02/2024    HGBA1C 5.90 (H) 2024    HGBA1C 10.7 (H) 2024     Blood Glucose   Glucose   Date/Time Value Ref Range Status   10/05/2024 1114 165 (H) 70 - 130 mg/dL Final   10/05/2024 0615 138 (H) 70 - 130 mg/dL Final  "  10/04/2024 2041 113 70 - 130 mg/dL Final   10/04/2024 1619 207 (H) 70 - 130 mg/dL Final   10/04/2024 1128 144 (H) 70 - 130 mg/dL Final   10/04/2024 0606 136 (H) 70 - 130 mg/dL Final   10/03/2024 2027 120 70 - 130 mg/dL Final   10/03/2024 1613 147 (H) 70 - 130 mg/dL Final     Infection   Results from last 7 days   Lab Units 10/01/24  1959   BLOODCX  No growth at 3 days  No growth at 3 days   PROCALCITONIN ng/mL 0.03     CMP   Results from last 7 days   Lab Units 10/04/24  0546 10/02/24  0444 10/01/24  1959   SODIUM mmol/L 137 136 139   POTASSIUM mmol/L 4.3 4.1 4.0   CHLORIDE mmol/L 106 104 103   CO2 mmol/L 23.0 21.2* 24.4   BUN mg/dL 16 12 14   CREATININE mg/dL 0.62* 0.58* 0.79   GLUCOSE mg/dL 107* 85 102*   ALBUMIN g/dL  --  3.5 3.6   BILIRUBIN mg/dL  --  0.4 0.4   ALK PHOS U/L  --  71 78   AST (SGOT) U/L  --  12 14   ALT (SGPT) U/L  --  12 11     ABG      UA      LISSA  No results found for: \"POCMETH\", \"POCAMPHET\", \"POCBARBITUR\", \"POCBENZO\", \"POCCOCAINE\", \"POCOPIATES\", \"POCOXYCODO\", \"POCPHENCYC\", \"POCPROPOXY\", \"POCTHC\", \"POCTRICYC\"  Lysis Labs   Results from last 7 days   Lab Units 10/04/24  0546 10/02/24  0444 10/01/24  1959   HEMOGLOBIN g/dL 12.8* 12.6* 12.3*   PLATELETS 10*3/mm3 255 230 246   CREATININE mg/dL 0.62* 0.58* 0.79     Radiology(recent) No radiology results for the last day    Most notable findings include: No leukocytosis.  Metatarsal head culture no growth at 2 days.    Active Hospital Problems:   Active Hospital Problems    Diagnosis  POA    **Osteomyelitis of great toe of right foot [M86.9]  Yes    Peripheral vascular disease [I73.9]  Yes    Atherosclerosis of native artery of extremity [I70.209]  Yes    Diabetic foot ulcer [E11.621, L97.509]  Yes    Type 2 diabetes mellitus [E11.9]  Yes      Resolved Hospital Problems   No resolved problems to display.      Assessment & Plan     Assessment / Plan     59-year-old gentleman with peripheral arterial disease status post bilateral iliac stents, " diabetes, right first toe osteomyelitis who is now status post right partial first ray resection on 10/3/2024.  Doing well overall.  Infectious disease recommendations reviewed and appreciated.  No growth from metatarsal head culture suggesting infected bone has all been removed.    Okay to ambulate in his postop shoe.  Possible discharge home later today or tomorrow.  Supportive care per primary team.  Will have patient follow-up in 3 weeks in clinic for suture removal.  Will need to keep incision covered with Kerlix and Ace wrap in the interim.    Carter Silva II, MD  10/05/24  15:07 EDT  Available via Secure Chat during the day for non-urgent issues.  After hours and for urgent issues please call the office at [phone]

## 2024-10-06 LAB
BACTERIA SPEC AEROBE CULT: NORMAL
C DIFF TOX GENS STL QL NAA+PROBE: NEGATIVE
GLUCOSE BLDC GLUCOMTR-MCNC: 124 MG/DL (ref 70–130)
GLUCOSE BLDC GLUCOMTR-MCNC: 125 MG/DL (ref 70–130)
GLUCOSE BLDC GLUCOMTR-MCNC: 168 MG/DL (ref 70–130)
GLUCOSE BLDC GLUCOMTR-MCNC: 169 MG/DL (ref 70–130)
GRAM STN SPEC: NORMAL

## 2024-10-06 PROCEDURE — 82948 REAGENT STRIP/BLOOD GLUCOSE: CPT

## 2024-10-06 PROCEDURE — 25010000002 VANCOMYCIN 10 G RECONSTITUTED SOLUTION: Performed by: STUDENT IN AN ORGANIZED HEALTH CARE EDUCATION/TRAINING PROGRAM

## 2024-10-06 PROCEDURE — 99233 SBSQ HOSP IP/OBS HIGH 50: CPT | Performed by: STUDENT IN AN ORGANIZED HEALTH CARE EDUCATION/TRAINING PROGRAM

## 2024-10-06 PROCEDURE — 25810000003 SODIUM CHLORIDE 0.9 % SOLUTION: Performed by: STUDENT IN AN ORGANIZED HEALTH CARE EDUCATION/TRAINING PROGRAM

## 2024-10-06 PROCEDURE — 63710000001 INSULIN LISPRO (HUMAN) PER 5 UNITS: Performed by: STUDENT IN AN ORGANIZED HEALTH CARE EDUCATION/TRAINING PROGRAM

## 2024-10-06 PROCEDURE — 87493 C DIFF AMPLIFIED PROBE: CPT | Performed by: NURSE PRACTITIONER

## 2024-10-06 PROCEDURE — 25010000002 CEFEPIME PER 500 MG: Performed by: STUDENT IN AN ORGANIZED HEALTH CARE EDUCATION/TRAINING PROGRAM

## 2024-10-06 RX ORDER — L.ACID,PARA/B.BIFIDUM/S.THERM 8B CELL
1 CAPSULE ORAL DAILY
Status: DISCONTINUED | OUTPATIENT
Start: 2024-10-06 | End: 2024-10-07 | Stop reason: HOSPADM

## 2024-10-06 RX ADMIN — Medication 10 ML: at 08:23

## 2024-10-06 RX ADMIN — CEFEPIME 2000 MG: 2 INJECTION, POWDER, FOR SOLUTION INTRAVENOUS at 18:40

## 2024-10-06 RX ADMIN — GLIPIZIDE 5 MG: 5 TABLET ORAL at 17:31

## 2024-10-06 RX ADMIN — INSULIN LISPRO 2 UNITS: 100 INJECTION, SOLUTION INTRAVENOUS; SUBCUTANEOUS at 20:56

## 2024-10-06 RX ADMIN — OXYCODONE HYDROCHLORIDE 10 MG: 10 TABLET ORAL at 08:27

## 2024-10-06 RX ADMIN — CLOPIDOGREL BISULFATE 75 MG: 75 TABLET, FILM COATED ORAL at 08:22

## 2024-10-06 RX ADMIN — Medication 1 CAPSULE: at 15:51

## 2024-10-06 RX ADMIN — OXYCODONE HYDROCHLORIDE 10 MG: 10 TABLET ORAL at 19:53

## 2024-10-06 RX ADMIN — OXYCODONE HYDROCHLORIDE 10 MG: 10 TABLET ORAL at 13:13

## 2024-10-06 RX ADMIN — VANCOMYCIN HYDROCHLORIDE 1750 MG: 10 INJECTION, POWDER, LYOPHILIZED, FOR SOLUTION INTRAVENOUS at 23:22

## 2024-10-06 RX ADMIN — CEFEPIME 2000 MG: 2 INJECTION, POWDER, FOR SOLUTION INTRAVENOUS at 03:52

## 2024-10-06 RX ADMIN — GLIPIZIDE 5 MG: 5 TABLET ORAL at 08:22

## 2024-10-06 RX ADMIN — CEFEPIME 2000 MG: 2 INJECTION, POWDER, FOR SOLUTION INTRAVENOUS at 13:14

## 2024-10-06 RX ADMIN — METFORMIN HYDROCHLORIDE 500 MG: 500 TABLET, FILM COATED ORAL at 17:31

## 2024-10-06 RX ADMIN — VANCOMYCIN HYDROCHLORIDE 1750 MG: 10 INJECTION, POWDER, LYOPHILIZED, FOR SOLUTION INTRAVENOUS at 10:41

## 2024-10-06 RX ADMIN — METFORMIN HYDROCHLORIDE 500 MG: 500 TABLET, FILM COATED ORAL at 08:22

## 2024-10-06 RX ADMIN — VANCOMYCIN HYDROCHLORIDE 1750 MG: 10 INJECTION, POWDER, LYOPHILIZED, FOR SOLUTION INTRAVENOUS at 00:00

## 2024-10-06 RX ADMIN — INSULIN LISPRO 2 UNITS: 100 INJECTION, SOLUTION INTRAVENOUS; SUBCUTANEOUS at 17:31

## 2024-10-06 NOTE — PROGRESS NOTES
LOS: 5 days     Chief Complaint: Osteomyelitis    Interval History: Patient resting comfortably this morning.  Remains afebrile.  Operative dressing in place.  Tolerating antibiotics.    Vital Signs  Temp:  [97.3 °F (36.3 °C)-98.6 °F (37 °C)] 97.7 °F (36.5 °C)  Heart Rate:  [72-99] 72  Resp:  [16-17] 17  BP: (106-149)/(76-85) 130/85    Physical Exam:  General: In no acute distress  HEENT: Oropharynx clear, moist mucous membranes  Respiratory: Normal work of breathing  Skin: Right foot with surgical dressing in place  Extremities: No edema, cyanosis  Access: Peripheral IV    Antibiotics:  Anti-Infectives (From admission, onward)      Ordered     Dose/Rate Route Frequency Start Stop    10/03/24 2221  vancomycin 1750 mg/500 mL 0.9% NS IVPB ([facility])        Ordering Provider: Carter Silva II, MD    1,750 mg  over 105 Minutes Intravenous Every 12 Hours 10/03/24 2330 10/09/24 1129    10/01/24 2200  cefepime 2000 mg IVPB in 100 mL NS ([facility])        Ordering Provider: Carter Silva II, MD    2,000 mg  over 4 Hours Intravenous Every 8 Hours 10/02/24 0300 10/09/24 0259    10/01/24 1901  cefepime 2000 mg IVPB in 100 mL NS ([facility]BP)        Ordering Provider: Canelo Canseco MD    2,000 mg  over 30 Minutes Intravenous Once 10/01/24 2000 10/01/24 2118    10/01/24 1901  vancomycin 2500 mg/500 mL 0.9% NS IVPB ([facility])        Ordering Provider: Canelo Canseco MD    20 mg/kg × 120 kg  over 150 Minutes Intravenous Once 10/01/24 2000 10/02/24 0140    10/01/24 1852  Pharmacy to dose vancomycin        Ordering Provider: Carter Silva II, MD     Does not apply Continuous PRN 10/01/24 1851 10/08/24 1850             Results Review:     I reviewed the patient's new clinical results.    Lab Results   Component Value Date    WBC 8.13 10/04/2024    HGB 12.8 (L) 10/04/2024    HCT 37.5 10/04/2024    MCV 89.3 10/04/2024     10/04/2024     Lab Results   Component Value Date    GLUCOSE 107 (H) 10/04/2024    BUN 16 10/04/2024    CREATININE 0.62 (L)  10/04/2024    EGFRIFNONA 113 12/13/2021    BCR 25.8 (H) 10/04/2024    CO2 23.0 10/04/2024    CALCIUM 8.6 10/04/2024    ALBUMIN 3.5 10/02/2024    AST 12 10/02/2024    ALT 12 10/02/2024       Microbiology:  10/3 OR cx R toe P  10/1 nasal MRSA PCR negative  10/1 BCx NGTD x 2    Assessment    #First right proximal and distal phalanges osteomyelitis with first IP joint septic arthritis status post partial first ray amputation on October 3  #Type 2 diabetes complicating above  (All new to me today)     OR cultures remain to date.  If finalized negative then would feel comfortable that source control has been achieved and could stop IV therapy with transition to skin/soft tissue course of Augmentin 875 twice daily plus doxycycline 100 mg twice daily through an end date of 10/9.      Addendum:  Bone culture from the OR is negative.  Final antibiotic recommendations as above for de-escalation to oral therapy for skin and soft tissue.    Thank you for allowing me to be involved in the care of this patient. Infectious diseases will sign off at this time with antibiotics plan in place, but please call me at [phone] if any further ID questions or new ID concerns.

## 2024-10-06 NOTE — PROGRESS NOTES
Name: Marbin Hawkins ADMIT: 10/1/2024   : 1965  PCP: Joyce Restrepo APRN    MRN: [id number] LOS: 5 days   AGE/SEX: 59 y.o. male  ROOM: [facility]/     Subjective   Subjective   No acute events. Patient had some diarrhea overnight which resolved. Worried about his pain although he did decently well with pain medications today. No family at bedside.    Objective   Objective   Vital Signs  Temp:  [97.3 °F (36.3 °C)-98.1 °F (36.7 °C)] 98.1 °F (36.7 °C)  Heart Rate:  [72-99] 83  Resp:  [16-17] 17  BP: (106-149)/(65-85) 116/65  SpO2:  [97 %-100 %] 99 %  on   ;   Device (Oxygen Therapy): room air  Body mass index is 29.3 kg/m².  Physical Exam  Vitals and nursing note reviewed.   Constitutional:       General: He is not in acute distress.     Appearance: He is not toxic-appearing or diaphoretic.   HENT:      Head: Normocephalic and atraumatic.      Nose: Nose normal.      Mouth/Throat:      Mouth: Mucous membranes are moist.      Pharynx: Oropharynx is clear.   Eyes:      Conjunctiva/sclera: Conjunctivae normal.      Pupils: Pupils are equal, round, and reactive to light.   Cardiovascular:      Rate and Rhythm: Normal rate and regular rhythm.      Pulses: Normal pulses.   Pulmonary:      Effort: Pulmonary effort is normal.      Breath sounds: Normal breath sounds.   Abdominal:      General: Bowel sounds are normal. There is no distension.      Palpations: Abdomen is soft.      Tenderness: There is no abdominal tenderness.   Musculoskeletal:         General: No swelling or tenderness.      Cervical back: Neck supple.      Comments: Right foot in dressing, c/d/i   Skin:     General: Skin is warm and dry.      Capillary Refill: Capillary refill takes less than 2 seconds.   Neurological:      General: No focal deficit present.      Mental Status: He is alert and oriented to person, place, and time.   Psychiatric:         Mood and Affect: Mood normal.         Behavior: Behavior normal.       Results Review     I reviewed  the patient's new clinical results.  Results from last 7 days   Lab Units 10/04/24  0546 10/02/24  0444 10/01/24  1959   WBC 10*3/mm3 8.13 6.13 6.80   HEMOGLOBIN g/dL 12.8* 12.6* 12.3*   PLATELETS 10*3/mm3 255 230 246     Results from last 7 days   Lab Units 10/04/24  0546 10/02/24  0444 10/01/24  1959   SODIUM mmol/L 137 136 139   POTASSIUM mmol/L 4.3 4.1 4.0   CHLORIDE mmol/L 106 104 103   CO2 mmol/L 23.0 21.2* 24.4   BUN mg/dL 16 12 14   CREATININE mg/dL 0.62* 0.58* 0.79   GLUCOSE mg/dL 107* 85 102*   EGFR mL/min/1.73 110.8 113.0 103.0     Results from last 7 days   Lab Units 10/02/24  0444 10/01/24  1959   ALBUMIN g/dL 3.5 3.6   BILIRUBIN mg/dL 0.4 0.4   ALK PHOS U/L 71 78   AST (SGOT) U/L 12 14   ALT (SGPT) U/L 12 11     Results from last 7 days   Lab Units 10/04/24  0546 10/02/24  0444 10/01/24  1959   CALCIUM mg/dL 8.6 8.9 8.6   ALBUMIN g/dL  --  3.5 3.6     Results from last 7 days   Lab Units 10/01/24  1959   PROCALCITONIN ng/mL 0.03   LACTATE mmol/L 1.1     Glucose   Date/Time Value Ref Range Status   10/06/2024 1635 169 (H) 70 - 130 mg/dL Final   10/06/2024 1104 125 70 - 130 mg/dL Final   10/06/2024 0601 124 70 - 130 mg/dL Final   10/05/2024 2027 135 (H) 70 - 130 mg/dL Final   10/05/2024 1556 135 (H) 70 - 130 mg/dL Final   10/05/2024 1114 165 (H) 70 - 130 mg/dL Final   10/05/2024 0615 138 (H) 70 - 130 mg/dL Final       No radiology results for the last day    I have personally reviewed all medications:  Scheduled Medications  cefepime, 2,000 mg, Intravenous, Q8H  clopidogrel, 75 mg, Oral, Daily  glipizide, 5 mg, Oral, BID AC  insulin lispro, 2-7 Units, Subcutaneous, 4x Daily AC & at Bedtime  lactobacillus acidophilus, 1 capsule, Oral, Daily  metFORMIN, 500 mg, Oral, BID With Meals  sodium chloride, 10 mL, Intravenous, Q12H  vancomycin, 1,750 mg, Intravenous, Q12H    Infusions  Pharmacy to dose vancomycin,     Diet  Diet: Diabetic; Consistent Carbohydrate; Fluid Consistency: Thin (IDDSI 0)    I have  personally reviewed:  [x]  Laboratory   [x]  Microbiology   [x]  Radiology   [x]  EKG/Telemetry  []  Cardiology/Vascular   []  Pathology    [x]  Records    Assessment/Plan     Active Hospital Problems    Diagnosis  POA    **Osteomyelitis of great toe of right foot [M86.9]  Yes    Peripheral vascular disease [I73.9]  Yes    Atherosclerosis of native artery of extremity [I70.209]  Yes    Diabetic foot ulcer [E11.621, L97.509]  Yes    Type 2 diabetes mellitus [E11.9]  Yes      Resolved Hospital Problems   No resolved problems to display.   Osteomyelitis of the Right Great Toe  - s/p right partial first ray amputation on 10/3/24 with Dr. Silva  - operative cultures negative  - continue on Vancomycin and cefepime with plan for augmentin and doxycycline at discharge through 10/9/24 per ID, appreciate recs  - continue local wound care, ambulation in postop shoe  - appreciate vascular surgery recs    Type 2 DM  - BG acceptable  - continue ssi/hypoglycemia protocol coverage and glipizide/metformin    PAD  - hx of bilateral iliac stents  - continue plavix        SCDs for DVT prophylaxis.  Full code.  Discussed with patient and nursing staff.  Anticipate discharge home tomorrow.  Expected Discharge Date: 10/5/2024; Expected Discharge Time:       Farooq Rojo MD  [facility]  10/06/24  18:09 EDT

## 2024-10-06 NOTE — PLAN OF CARE
Goal Outcome Evaluation:  Plan of Care Reviewed With: patient        Progress: no change  Outcome Evaluation: Has had several loose BM this shift.  Got order for and sent Cdiff.  Able to get to BR with walker and keep no wt bearing off right foot.  Dressing CDI.  Possible DC today pending Cdiff.

## 2024-10-07 ENCOUNTER — READMISSION MANAGEMENT (OUTPATIENT)
Dept: CALL CENTER | Facility: HOSPITAL | Age: 59
End: 2024-10-07
Payer: MEDICAID

## 2024-10-07 VITALS
HEIGHT: 78 IN | TEMPERATURE: 97.5 F | DIASTOLIC BLOOD PRESSURE: 76 MMHG | HEART RATE: 77 BPM | OXYGEN SATURATION: 98 % | RESPIRATION RATE: 17 BRPM | SYSTOLIC BLOOD PRESSURE: 105 MMHG | BODY MASS INDEX: 30.86 KG/M2 | WEIGHT: 266.76 LBS

## 2024-10-07 LAB — GLUCOSE BLDC GLUCOMTR-MCNC: 102 MG/DL (ref 70–130)

## 2024-10-07 PROCEDURE — 82948 REAGENT STRIP/BLOOD GLUCOSE: CPT

## 2024-10-07 PROCEDURE — 25010000002 CEFEPIME PER 500 MG: Performed by: STUDENT IN AN ORGANIZED HEALTH CARE EDUCATION/TRAINING PROGRAM

## 2024-10-07 RX ORDER — OXYCODONE HYDROCHLORIDE 5 MG/1
5-10 TABLET ORAL EVERY 4 HOURS PRN
Qty: 36 TABLET | Refills: 0 | Status: SHIPPED | OUTPATIENT
Start: 2024-10-07

## 2024-10-07 RX ORDER — DOXYCYCLINE 100 MG/1
100 CAPSULE ORAL 2 TIMES DAILY
Qty: 5 CAPSULE | Refills: 0 | Status: SHIPPED | OUTPATIENT
Start: 2024-10-07 | End: 2024-10-10

## 2024-10-07 RX ORDER — L.ACID,PARA/B.BIFIDUM/S.THERM 8B CELL
1 CAPSULE ORAL DAILY
Qty: 30 EACH | Refills: 0 | Status: SHIPPED | OUTPATIENT
Start: 2024-10-07

## 2024-10-07 RX ADMIN — Medication 1 CAPSULE: at 09:54

## 2024-10-07 RX ADMIN — CEFEPIME 2000 MG: 2 INJECTION, POWDER, FOR SOLUTION INTRAVENOUS at 02:19

## 2024-10-07 RX ADMIN — Medication 10 ML: at 09:54

## 2024-10-07 RX ADMIN — OXYCODONE HYDROCHLORIDE 10 MG: 10 TABLET ORAL at 11:00

## 2024-10-07 RX ADMIN — OXYCODONE HYDROCHLORIDE 10 MG: 10 TABLET ORAL at 00:06

## 2024-10-07 RX ADMIN — METFORMIN HYDROCHLORIDE 500 MG: 500 TABLET, FILM COATED ORAL at 09:54

## 2024-10-07 RX ADMIN — CLOPIDOGREL BISULFATE 75 MG: 75 TABLET, FILM COATED ORAL at 09:54

## 2024-10-07 RX ADMIN — GLIPIZIDE 5 MG: 5 TABLET ORAL at 09:54

## 2024-10-07 RX ADMIN — OXYCODONE HYDROCHLORIDE 10 MG: 10 TABLET ORAL at 06:27

## 2024-10-07 NOTE — CASE MANAGEMENT/SOCIAL WORK
Case Management Discharge Note      Final Note: home no needs         Selected Continued Care - Discharged on 10/7/2024 Admission date: 10/1/2024 - Discharge disposition: Home or Self Care      Destination    No services have been selected for the patient.                Durable Medical Equipment    No services have been selected for the patient.                Dialysis/Infusion    No services have been selected for the patient.                Home Medical Care    No services have been selected for the patient.                Therapy    No services have been selected for the patient.                Community Resources    No services have been selected for the patient.                Community & DME    No services have been selected for the patient.                    Transportation Services  Private: Car    Final Discharge Disposition Code: 01 - home or self-care

## 2024-10-07 NOTE — CASE MANAGEMENT/SOCIAL WORK
Continued Stay Note  [facility]     Patient Name: Marbin Hawkins  MRN: [id number]  Today's Date: 10/7/2024    Admit Date: 10/1/2024    Plan: Home with wife   Discharge Plan       Row Name 10/07/24 3684       Plan    Plan Home with wife    Patient/Family in Agreement with Plan yes    Plan Comments Referral noted for HH for wound care, pt has no insurance and is no homebound, does not qualify for HH services. Discussed with primary RN, she will facilitate teaching to him and his wife dressing changes and supply dressing materials for home use. No further dc needs, -Gila DEJESUS    Final Discharge Disposition Code 01 - home or self-care    Final Note home no needs                   Discharge Codes    No documentation.                 Expected Discharge Date and Time       Expected Discharge Date Expected Discharge Time    Oct 7, 2024               Gila Izquierdo RN

## 2024-10-07 NOTE — DISCHARGE SUMMARY
"Date of Admission: 10/1/2024  Date of Discharge:  10/7/2024  Primary Care Physician: Joyce Restrepo APRN     Discharge Diagnosis:  Active Hospital Problems    Diagnosis  POA    **Osteomyelitis of great toe of right foot [M86.9]  Yes    Peripheral vascular disease [I73.9]  Yes    Atherosclerosis of native artery of extremity [I70.209]  Yes    Diabetic foot ulcer [E11.621, L97.509]  Yes    Type 2 diabetes mellitus [E11.9]  Yes      Resolved Hospital Problems   No resolved problems to display.       Presenting Problem/History of Present Illness:  Osteomyelitis of great toe of right foot [M86.9]     Hospital Course:  The patient is a 59 y.o. male with a history of Type 2 DM and peripheral vascular disease s/p recent bilateral common iliac stent placement, left common iliac artery angioplasty, and debridement of a right great toe wound on 8/29/24 who presented from the vascular surgery office with a worsening right great toe wound with clinical evidence of osteomyelitis. Please see admission H&P for further details. He was started on antibiotics and evaluated by infectious diseases and vascular surgery. He underwent a right partial first ray amputation on 10/3/24 with Dr. Silva. Operative cultures were negative and there was no apparent infected tissue left following the amputation. He was treated with vancomycin and cefepime in the hospital and will be discharged on PO augmentin and doxycycline through 10/9/24 per infectious diseases recommendations. He should continue wound care per vascular surgery recommendations and follow up with them in the office in 3 weeks for suture removal.     Exam Today:  Blood pressure 105/76, pulse 77, temperature 97.5 °F (36.4 °C), temperature source Oral, resp. rate 17, height 203.2 cm (80\"), weight 121 kg (266 lb 12.1 oz), SpO2 98%.  Vitals and nursing note reviewed.   Constitutional:       General: He is not in acute distress.     Appearance: He is not toxic-appearing or diaphoretic. "   HENT:      Head: Normocephalic and atraumatic.      Nose: Nose normal.      Mouth/Throat:      Mouth: Mucous membranes are moist.      Pharynx: Oropharynx is clear.   Eyes:      Conjunctiva/sclera: Conjunctivae normal.      Pupils: Pupils are equal, round, and reactive to light.   Cardiovascular:      Rate and Rhythm: Normal rate and regular rhythm.      Pulses: Normal pulses.   Pulmonary:      Effort: Pulmonary effort is normal.      Breath sounds: Normal breath sounds.   Abdominal:      General: Bowel sounds are normal. There is no distension.      Palpations: Abdomen is soft.      Tenderness: There is no abdominal tenderness.   Musculoskeletal:         General: No swelling or tenderness.      Cervical back: Neck supple.      Comments: Right foot in dressing, c/d/i   Skin:     General: Skin is warm and dry.      Capillary Refill: Capillary refill takes less than 2 seconds.   Neurological:      General: No focal deficit present.      Mental Status: He is alert and oriented to person, place, and time.   Psychiatric:         Mood and Affect: Mood normal.         Behavior: Behavior normal.     Procedures Performed:  Procedure(s):  AMPUTATION DIGIT - RIGHT FIRST TOE       Consults:   Consults       Date and Time Order Name Status Description    10/4/2024 12:24 PM Inpatient Infectious Diseases Consult Completed     10/1/2024  6:52 PM Inpatient Vascular Surgery Consult Completed              Discharge Disposition:  Home or Self Care    Discharge Medications:     Discharge Medications        New Medications        Instructions Start Date   amoxicillin-clavulanate 875-125 MG per tablet  Commonly known as: AUGMENTIN   1 tablet, Oral, 2 Times Daily      doxycycline 100 MG capsule  Commonly known as: VIBRAMYCIN   100 mg, Oral, 2 Times Daily      lactobacillus acidophilus capsule capsule   1 capsule, Oral, Daily      naloxone 4 MG/0.1ML nasal spray  Commonly known as: NARCAN   Call 911. Don't prime. Spray in 1 nostril for  overdose. Repeat in 2-3 minutes in other nostril if no or minimal breathing/responsiveness.      oxyCODONE 5 MG immediate release tablet  Commonly known as: ROXICODONE   5-10 mg, Oral, Every 4 Hours PRN             Continue These Medications        Instructions Start Date   clopidogrel 75 MG tablet  Commonly known as: Plavix   75 mg, Oral, Daily      glipizide 5 MG tablet  Commonly known as: GLUCOTROL   5 mg, Oral, 2 Times Daily Before Meals      metFORMIN 500 MG tablet  Commonly known as: GLUCOPHAGE   500 mg, Oral, 2 Times Daily With Meals             Stop These Medications      ciprofloxacin 500 MG tablet  Commonly known as: Cipro     naproxen 500 MG tablet  Commonly known as: Naprosyn     traMADol 50 MG tablet  Commonly known as: Ultram              Discharge Diet:   Diet Instructions       Diet: Diabetic Diets; Consistent Carbohydrate; Regular (IDDSI 7); Thin (IDDSI 0)      Discharge Diet: Diabetic Diets    Diabetic Diet: Consistent Carbohydrate    Texture: Regular (IDDSI 7)    Fluid Consistency: Thin (IDDSI 0)            Activity at Discharge:   Activity Instructions       Other Activity Instructions      Activity Instructions: ambulate as tolerated in postop shoe            Follow-up Appointments:  Future Appointments   Date Time Provider Department Center   10/9/2024 10:30 AM Jes Chiu Jr., MD [facility]   1/22/2025 11:00 AM [facility] 2 [facility]   1/22/2025 11:45 AM [facility] 2 [facility]   1/29/2025 11:45 AM Jes Chiu Jr., MD [facility]     Additional Instructions for the Follow-ups that You Need to Schedule       Ambulatory Referral to Home Health   As directed      Face to Face Visit Date: 10/7/2024   Follow-up provider for Plan of Care?: I treated the patient in an acute care facility and will not continue treatment after discharge.   Follow-up provider: YUKI MOTLEY [[id number]]   Reason/Clinical Findings: left foot wound   Describe mobility  limitations that make leaving home difficult: left foot wound   Nursing/Therapeutic Services Requested: Skilled Nursing   Skilled nursing orders: Wound care dressing/changes   Frequency: 1 Week 1        Discharge Follow-up with PCP   As directed       Currently Documented PCP:    Joyce Restrepo APRN    PCP Phone Number:    [phone]     Follow Up Details: 1 week        Discharge Follow-up with Specified Provider: Dr. Silav (Vascular Surgery); 3 Weeks   As directed      To: Dr. Silva (Vascular Surgery)   Follow Up: 3 Weeks                Test Results Pending at Discharge:  Pending Labs       Order Current Status    Anaerobic Culture - Surgical Site, Toe, Right Preliminary result    Anaerobic Culture - Surgical Site, Toe, Right Preliminary result             Farooq Rojo MD  10/07/24  12:09 EDT    Time Spent on Discharge Activities: Greater than 30 minutes.

## 2024-10-07 NOTE — PLAN OF CARE
Goal Outcome Evaluation:         AVS completed for discharge and discharge instructions provided to patient. Dressing change completed with spouse and supplies provided. PIV x1 removed. Transport placed.

## 2024-10-08 LAB
BACTERIA SPEC ANAEROBE CULT: NORMAL
BACTERIA SPEC ANAEROBE CULT: NORMAL

## 2024-10-08 NOTE — OUTREACH NOTE
Prep Survey      Flowsheet Row Responses   [facility] facility patient discharged from? [facility]   Is LACE score < 7 ? No   Eligibility Readm Mgmt   Discharge diagnosis Osteomyelitis of great toe of right foot, AMPUTATION DIGIT - RIGHT FIRST TOE   Does the patient have one of the following disease processes/diagnoses(primary or secondary)? General Surgery   Does the patient have Home health ordered? No   Is there a DME ordered? No   Prep survey completed? Yes            Allison HORTON - Registered Nurse

## 2024-10-09 ENCOUNTER — DOCUMENTATION (OUTPATIENT)
Age: 59
End: 2024-10-09
Payer: MEDICAID

## 2024-10-09 NOTE — PROGRESS NOTES
Name: Marbin Hawkins ADMIT: (Not on file)   : 1965  PCP: Joyce Restrepo APRN    MRN: [id number] LOS: 0 days   AGE/SEX: 59 y.o. male  ROOM:   Room/bed info not found     Vascular Surgery Progress Note    Called patient at the number listed in the EMR to discuss how his toe amputation site was doing.  I had been unable to connect with him on 2 previous calls.  He reports that he is doing well and his wife has been changing his dressing daily.  He has no complaints at this time and reports it appears to be healing nicely.  He is scheduled to see me 10/18/2024 in clinic.  Encouraged him to call our office if he has any problems in the interim.        Carter Silva II, MD  10/09/24  18:06 EDT  Office Number [phone]    [facility] [place] Vascular Surgery

## 2024-10-10 ENCOUNTER — READMISSION MANAGEMENT (OUTPATIENT)
Dept: CALL CENTER | Facility: HOSPITAL | Age: 59
End: 2024-10-10
Payer: MEDICAID

## 2024-10-10 NOTE — OUTREACH NOTE
General Surgery Week 1 Survey      Flowsheet Row Responses   [facility] patient discharged from? [place]   Does the patient have one of the following disease processes/diagnoses(primary or secondary)? General Surgery   Week 1 attempt successful? Yes   Call start time 0845   Call end time [phone]   Discharge diagnosis Osteomyelitis of great toe of right foot, AMPUTATION DIGIT - RIGHT FIRST TOE   Person spoke with today (if not patient) and relationship Spouse   Meds reviewed with patient/caregiver? Yes   Is the patient having any side effects they believe may be caused by any medication additions or changes? No   Does the patient have all medications related to this admission filled (includes all antibiotics, pain medications, etc.) No   What is keeping the patient from filling the prescriptions? --  [Pharmacy stated they did not have a RX for the probiotic]   Nursing Interventions Nurse advised patient to call provider  [spouse is going there today to verify and purchase otc if need be]   Is the patient taking all medications as directed (includes completed medication regime)? Yes   Does the patient have a follow up appointment scheduled with their surgeon? Yes  [10/18/24]   Has the patient kept scheduled appointments due by today? N/A   Has home health visited the patient within 72 hours of discharge? N/A   DME comments Pt uses a walker and wears a post op shoe   Psychosocial issues? No   Did the patient receive a copy of their discharge instructions? Yes   Nursing interventions Reviewed instructions with patient   What is the patient's perception of their health status since discharge? Improving   Nursing interventions Nurse provided patient education   Is the patient /caregiver able to teach back basic post-op care? Keep incision areas clean,dry and protected, No tub bath, swimming, or hot tub until instructed by MD   Is the patient/caregiver able to teach back signs and symptoms of incisional infection?  Increased redness, swelling or pain at the incisonal site, Increased drainage or bleeding   Is the patient/caregiver able to teach back steps to recovery at home? Set small, achievable goals for return to baseline health   Is the patient/caregiver able to teach back the hierarchy of who to call/visit for symptoms/problems? PCP, Specialist, Home health nurse, Urgent Care, ED, 911 Yes   Week 1 call completed? Yes   Is the patient interested in additional calls from an ambulatory ? No   Would this patient benefit from a Referral to [facility] Social Work? No   Call end time [phone]            Josselin HORTON - Registered Nurse

## 2024-10-18 ENCOUNTER — OFFICE VISIT (OUTPATIENT)
Age: 59
End: 2024-10-18

## 2024-10-18 VITALS — WEIGHT: 266 LBS | BODY MASS INDEX: 30.78 KG/M2 | HEIGHT: 78 IN

## 2024-10-18 DIAGNOSIS — E11.621 DIABETIC ULCER OF TOE ASSOCIATED WITH TYPE 2 DIABETES MELLITUS, WITH OTHER ULCER SEVERITY, UNSPECIFIED LATERALITY: ICD-10-CM

## 2024-10-18 DIAGNOSIS — I73.9 PERIPHERAL VASCULAR DISEASE: ICD-10-CM

## 2024-10-18 DIAGNOSIS — L97.508 DIABETIC ULCER OF TOE ASSOCIATED WITH TYPE 2 DIABETES MELLITUS, WITH OTHER ULCER SEVERITY, UNSPECIFIED LATERALITY: ICD-10-CM

## 2024-10-18 DIAGNOSIS — S98.131A AMPUTATION OF TOE OF RIGHT FOOT: Primary | ICD-10-CM

## 2024-10-18 NOTE — PROGRESS NOTES
"Chief Complaint  Post-op Follow-up    Follow-up for right first toe amputation    Subjective        Marbin Hawkins presents to [facility] VASCULAR SURGERY  History of Present Illness    Patient is a pleasant 59-year-old gentleman with history of peripheral arterial disease status post bilateral iliac stent placement on 8/29/2024.  The patient developed osteomyelitis of his right first toe and underwent toe amputation with debridement of the proximal metatarsal head on 10/3/2024.  He is here for routine follow-up after the procedure.    He is doing well overall.  He reports he does not have a lot of pain but the foot feels \"weird\".  He still has some swelling.    No fevers or chills.  He has completed his course of antibiotics.  Cultures of the toe and proximal metatarsal head did not grow anything.      Objective   Vital Signs:  Ht 203.2 cm (80\")   Wt 121 kg (266 lb)   BMI 29.22 kg/m²   Estimated body mass index is 29.22 kg/m² as calculated from the following:    Height as of this encounter: 203.2 cm (80\").    Weight as of this encounter: 121 kg (266 lb).                   Physical Exam   Palpable pulses in his right foot.  I did feel a DP and PT today, and hospital could only feel PT, likely improved due to less edema.  Incision healing reasonably well.  There is some scab at the medial aspect but skin is intact.  No erythema or induration or drainage.        Result Review :                     Assessment and Plan     59-year-old man who is 2 weeks status post right partial first ray amputation.  Doing well overall.  Incision appears to be at a normal stage of healing.  I elected to leave the sutures in for another 2 weeks.  Patient encouraged to continue local wound care with Betadine, gauze, Ace wrap.  He has been compliant with his offloading shoe and was wearing it here today in the office.  Patient and wife encouraged to call our office and request to speak to me directly if they have any " problems.  They report having called the office and sent messages with no response over the last 2 weeks.        Diagnoses and all orders for this visit:    1. Amputation of toe of right foot (Primary)    2. Peripheral vascular disease    3. Diabetic ulcer of toe associated with type 2 diabetes mellitus, with other ulcer severity, unspecified laterality              Patient BMI noted. Educational material for patient for health risks of being overweight added to patient's after visit summary.           Follow Up     Return in about 2 weeks (around 11/1/2024).  Patient was given instructions and counseling regarding his condition or for health maintenance advice. Please see specific information pulled into the AVS if appropriate.

## 2024-10-22 ENCOUNTER — TELEPHONE (OUTPATIENT)
Age: 59
End: 2024-10-22
Payer: MEDICAID

## 2024-10-22 NOTE — TELEPHONE ENCOUNTER
I spoke to pt spouse and let her know that it is ok to wait until 11/8. She stated she wanted the pt seen every 2 weeks, I offered a 10/29/24 apt at [facility] and she did not want to take that. Pt spouse agreed to keep 11/8/24 apt at [facility].

## 2024-11-08 ENCOUNTER — OFFICE VISIT (OUTPATIENT)
Age: 59
End: 2024-11-08
Payer: MEDICAID

## 2024-11-08 VITALS — BODY MASS INDEX: 34.13 KG/M2 | HEIGHT: 78 IN | WEIGHT: 295 LBS

## 2024-11-08 DIAGNOSIS — I73.9 PERIPHERAL VASCULAR DISEASE: Primary | ICD-10-CM

## 2024-11-08 DIAGNOSIS — M86.9 OSTEOMYELITIS OF GREAT TOE OF RIGHT FOOT: ICD-10-CM

## 2024-11-08 NOTE — PROGRESS NOTES
"Chief Complaint  Post-op Follow-up    Follow-up for right first toe amputation    Subjective        Marbin Hawkins presents to [facility] VASCULAR SURGERY  History of Present Illness    Patient is a pleasant 59-year-old gentleman with history of peripheral arterial disease status post bilateral iliac stent placement on 8/29/2024.  The patient developed osteomyelitis of his right first toe and underwent toe amputation with debridement of the proximal metatarsal head on 10/3/2024.  He is here for routine follow-up after the procedure.    His foot continues to improve.  Incision healing nicely.  Still some areas of scab but skin is healing underneath/around these areas.  No cellulitis or purulence.    He has completed his course of antibiotics.  Cultures of the toe and proximal metatarsal head did not grow anything.        Objective   Vital Signs:  Ht 203.2 cm (80\")   Wt 134 kg (295 lb)   BMI 32.41 kg/m²   Estimated body mass index is 32.41 kg/m² as calculated from the following:    Height as of this encounter: 203.2 cm (80\").    Weight as of this encounter: 134 kg (295 lb).                   Physical Exam   Palpable pulses in his right foot.  I did feel a DP and PT today, and hospital could only feel PT, likely improved due to less edema.  Incision healing well.  Sutures were removed in clinic today.  Still with the area of scab in the medial aspect of the incision but otherwise skin is healing very nicely.  No erythema induration drainage or evidence of other infection        Result Review :                     Assessment and Plan     59-year-old man who is 4 weeks status post right partial first ray amputation.  Doing well overall, and incision is continuing to heal nicely.  Will see him again in another 2 weeks for wound check.  He was encouraged to continue Betadine, dry gauze, Ace wrap in the interim.  Patient is wanting to go back to wear regular tennis shoes because his foot feels unstable in " the postop shoe.  I believe his incision is healed enough that this should be okay so long as his shoes are not too tight.  He verbalized understanding and agreement with this.    Will see him again in 2 weeks.  They were encouraged to call our office to be seen sooner if he has any new or worsening problems.    He has routine follow-up scheduled with Dr. Chiu in January for surveillance of his iliac stents. Will have him keep this appointment.       Diagnoses and all orders for this visit:    1. Peripheral vascular disease (Primary)    2. Osteomyelitis of great toe of right foot                Patient BMI noted. Educational material for patient for health risks of being overweight added to patient's after visit summary.           Follow Up     Return in about 2 weeks (around 11/22/2024).  Patient was given instructions and counseling regarding his condition or for health maintenance advice. Please see specific information pulled into the AVS if appropriate.

## 2024-11-22 ENCOUNTER — OFFICE VISIT (OUTPATIENT)
Age: 59
End: 2024-11-22

## 2024-11-22 VITALS — HEIGHT: 78 IN | BODY MASS INDEX: 34.59 KG/M2 | WEIGHT: 299 LBS

## 2024-11-22 DIAGNOSIS — L97.508 DIABETIC ULCER OF TOE ASSOCIATED WITH TYPE 2 DIABETES MELLITUS, WITH OTHER ULCER SEVERITY, UNSPECIFIED LATERALITY: ICD-10-CM

## 2024-11-22 DIAGNOSIS — I73.9 PERIPHERAL VASCULAR DISEASE: Primary | ICD-10-CM

## 2024-11-22 DIAGNOSIS — E11.621 DIABETIC ULCER OF TOE ASSOCIATED WITH TYPE 2 DIABETES MELLITUS, WITH OTHER ULCER SEVERITY, UNSPECIFIED LATERALITY: ICD-10-CM

## 2024-11-22 DIAGNOSIS — Z12.11 SCREEN FOR COLON CANCER: ICD-10-CM

## 2024-11-22 NOTE — PROGRESS NOTES
"Chief Complaint  Post-op Follow-up    Follow-up for right first toe amputation    Subjective        Marbin Hawkins presents to [facility] VASCULAR SURGERY  History of Present Illness    Patient is a pleasant 59-year-old gentleman with history of peripheral arterial disease status post bilateral iliac stent placement on 8/29/2024.  The patient developed osteomyelitis of his right first toe and underwent toe amputation with debridement of the proximal metatarsal head on 10/3/2024.    He is here today for another follow up for a wound check.       The patient's toe wound continues to improve.  His mobility is improving as well.  He still has some swelling in the foot but overall appears to be doing very well.    Objective   Vital Signs:  Ht 203.2 cm (80\")   Wt 136 kg (299 lb)   BMI 32.85 kg/m²   Estimated body mass index is 32.85 kg/m² as calculated from the following:    Height as of this encounter: 203.2 cm (80\").    Weight as of this encounter: 136 kg (299 lb).                   Physical Exam   Palpable pulses in his right foot.  I did feel a DP and PT today, and hospital could only feel PT, likely improved due to less edema.  Incision continues to heal well.  There is a very small area of scab about 2 mm in diameter in the midportion but the skin is otherwise healed up.  He has a little bit of callus on the inferior aspect but no skin separation or erythema or induration or drainage or any concerning signs on exam.  He still has palpable DP and PT pulses.  Small callus noted on the medial aspect of his right heel.  No skin breakdown/defect.      Result Review :                     Assessment and Plan     59-year-old man who is 7 weeks status post right partial first ray amputation.    Pt returns for follow up again today.     Doing well overall.  I think we can back off of his wound care regimen for now.  I discussed with him and his wife that simple Betadine paint and Band-Aid would be sufficient " for now.  The patient is beginning to develop a callus on the middle aspect of his right heel which he attributes to his postop shoe rubbing against it.  I believe he is healed up well enough now that he go back to wearing a regular shoe so long as it is not too tight.  He verbalized understanding and agreement with this.    I will plan to see him back in 1 month for another wound check.  The patient requested we move his noninvasive studies up to be performed next month as well so they can be completed in this calendar year for insurance purposes.  We will try to accommodate that.    Patient is to continue his Plavix and other medications as prescribed by his other providers in the interim.          Diagnoses and all orders for this visit:    1. Peripheral vascular disease (Primary)    2. Diabetic ulcer of toe associated with type 2 diabetes mellitus, with other ulcer severity, unspecified laterality    3. Screen for colon cancer                  Patient BMI noted. Educational material for patient for health risks of being overweight added to patient's after visit summary.           Follow Up     Return in about 4 weeks (around 12/20/2024).  Patient was given instructions and counseling regarding his condition or for health maintenance advice. Please see specific information pulled into the AVS if appropriate.

## 2024-12-03 RX ORDER — CLOPIDOGREL BISULFATE 75 MG/1
75 TABLET ORAL DAILY
Qty: 90 TABLET | Refills: 0 | Status: SHIPPED | OUTPATIENT
Start: 2024-12-03

## 2024-12-13 ENCOUNTER — HOSPITAL ENCOUNTER (OUTPATIENT)
Facility: HOSPITAL | Age: 59
Discharge: HOME | End: 2024-12-13
Payer: COMMERCIAL

## 2024-12-13 ENCOUNTER — OFFICE VISIT (OUTPATIENT)
Age: 59
End: 2024-12-13
Payer: COMMERCIAL

## 2024-12-13 ENCOUNTER — TELEPHONE (OUTPATIENT)
Age: 59
End: 2024-12-13
Payer: COMMERCIAL

## 2024-12-13 VITALS
WEIGHT: 300 LBS | DIASTOLIC BLOOD PRESSURE: 80 MMHG | HEIGHT: 78 IN | SYSTOLIC BLOOD PRESSURE: 135 MMHG | BODY MASS INDEX: 34.71 KG/M2

## 2024-12-13 DIAGNOSIS — I73.9 PERIPHERAL VASCULAR DISEASE: Primary | ICD-10-CM

## 2024-12-13 DIAGNOSIS — S98.131A AMPUTATION OF TOE OF RIGHT FOOT: ICD-10-CM

## 2024-12-13 DIAGNOSIS — I74.5 ILIAC ARTERY OCCLUSION, LEFT: ICD-10-CM

## 2024-12-13 DIAGNOSIS — I70.245 ATHEROSCLEROSIS OF NATIVE ARTERY OF LEFT LOWER EXTREMITY WITH ULCERATION OF OTHER PART OF FOOT: ICD-10-CM

## 2024-12-13 PROCEDURE — 93925 LOWER EXTREMITY STUDY: CPT

## 2024-12-13 PROCEDURE — 93922 UPR/L XTREMITY ART 2 LEVELS: CPT

## 2024-12-13 RX ORDER — GLIPIZIDE 5 MG/1
5 TABLET ORAL
COMMUNITY
Start: 2024-09-10

## 2024-12-13 NOTE — TELEPHONE ENCOUNTER
"Pt wife called stating pt second toe is now red, swollen and has a \"green tinged\" nail bed. Pt wife states the pt did stump his toe 2 days ago. Pt had a first great toe amp on 10/3/24. Pt wife was concerned of osteomyelitis forming again. With consultation with the Rn supervisor Nikita PETERSON We decided t bring pt in today for his scans and follow up that were originally scheduled for 12/20/24. Pt wife verbalized understanding of 1430 apt today.  "

## 2024-12-13 NOTE — PROGRESS NOTES
Chief Complaint  Peripheral Vascular Disease    Subjective        Marbin Hawkins presents to [facility] VASCULAR SURGERY  HPI   Marbin Hawkins is a 59 y.o. male that has been followed in our office Dr. Chiu for peripheral arterial disease.  On 8/29/2024, he had an arteriogram angioplasty left common iliac artery and bilateral common iliac kissing covered stent placements as well as a excisional debridement of her right great toe.  He then presented to our office in October and was found to have osteomyelitis therefore he underwent a left partial first ray amputation on 10/3/2024.  Today in follow-up along with ABIs and a graft scan.  He last saw Dr. Silva on 11/22/2024 and his wound was healing satisfactorily.  Today, his wound is healed.  He states last night he hit his right second toe on a garbage can and it became bruised and he is very concerned that it is going to turn into an infection.    Review of Systems   Constitutional:  Negative for fever.   Eyes:  Negative for visual disturbance.   Cardiovascular:  Negative for leg swelling.   Gastrointestinal:  Negative for abdominal pain.   Musculoskeletal:  Negative for back pain.   Skin:  Negative for color change, pallor and wound.   Neurological:  Negative for dizziness, facial asymmetry, speech difficulty and weakness.        Marbin Hawkins  reports that he has quit smoking. His smoking use included cigarettes. He has a 105 pack-year smoking history. He has never used smokeless tobacco..        Objective   Vital Signs:  Vitals:    12/13/24 1601   BP: 135/80        Body mass index is 32.96 kg/m².           Physical Exam  Vitals reviewed.   Constitutional:       Appearance: Normal appearance.   HENT:      Head: Normocephalic.   Cardiovascular:      Rate and Rhythm: Normal rate and regular rhythm.      Pulses:           Dorsalis pedis pulses are 3+ on the right side and 3+ on the left side.        Posterior tibial pulses are 3+ on the right side and 3+ on  the left side.   Pulmonary:      Effort: Pulmonary effort is normal.   Skin:     General: Skin is warm.   Neurological:      General: No focal deficit present.      Mental Status: He is alert and oriented to person, place, and time.   Psychiatric:         Mood and Affect: Mood normal.        Result Review :    Doppler Arterial Multi Level Lower Extremity - Bilateral CAR (08/25/2024 14:10)   MRI Foot Right With & Without Contrast (08/25/2024 10:40)                      Assessment and Plan     Diagnoses and all orders for this visit:    1. Peripheral vascular disease (Primary)    2. Amputation of toe of right foot            Patient presents today for follow up of his peripheral arterial disease.  He is status post right first ray amputation.  Today, his ABIs are normal.  He had a bilateral arterial duplex today which showed patent vessels bilaterally.  He is to continue his antiplatelet agent which is Plavix.  He will return in 6 months along with ABIs and iliac stent scan.  Wound is healed.  We discussed discontinuing his wound care.  I have written a prescription to get him a custom insert at [facility].  I also assured him that his right second toe does not appear to have any infection at this point, though it does get worse, to contact our office.       Follow Up     No follow-ups on file.  Patient was given instructions and counseling regarding his condition or for health maintenance advice. Please see specific information pulled into the AVS if appropriate.     ADIEL Cohn

## 2024-12-16 LAB
BH CV GRAFT BRACHIAL PRESSURE LEFT: NORMAL MMHG
BH CV GRAFT BRACHIAL PRESSURE RIGHT: NORMAL MMHG
BH CV LEA LEFT CFA PROX PSV: 141 CM/S
BH CV LEA LEFT COMMON ILIAC PSV: 163 CM/S
BH CV LEA LEFT DFA PROX PSV: 63 CM/S
BH CV LEA LEFT DPA PRESSURE: 138 MMHG
BH CV LEA LEFT EXT ILIAC PSV: 202 CM/S
BH CV LEA LEFT POPITEAL A  MID PSV: 86 CM/S
BH CV LEA LEFT POPITEAL A  PROX PSV: 74.5 CM/S
BH CV LEA LEFT PTA PRESSURE: 148 MMHG
BH CV LEA LEFT SFA DISTAL PSV: -120.2 CM/S
BH CV LEA LEFT SFA MID EDV: -13.8 CM/S
BH CV LEA LEFT SFA MID PSV: -193.5 CM/S
BH CV LEA LEFT SFA PROX PSV: 128 CM/S
BH CV LEA RIGHT CFA DISTAL EDV: 14 CM/S
BH CV LEA RIGHT CFA DISTAL PSV: 154 CM/S
BH CV LEA RIGHT CFA PROX EDV: 16 CM/S
BH CV LEA RIGHT CFA PROX PSV: 178 CM/S
BH CV LEA RIGHT COMMON ILIAC EDV: 20 CM/S
BH CV LEA RIGHT COMMON ILIAC PSV: 158 CM/S
BH CV LEA RIGHT DPA PRESSURE: 140 MMHG
BH CV LEA RIGHT EXT ILIAC EDV: 24 CM/S
BH CV LEA RIGHT EXT ILIAC PSV: 196 CM/S
BH CV LEA RIGHT POPITEAL A  DISTAL EDV: -15.2 CM/S
BH CV LEA RIGHT POPITEAL A  DISTAL PSV: -87.4 CM/S
BH CV LEA RIGHT POPITEAL A  PROX EDV: 12.3 CM/S
BH CV LEA RIGHT POPITEAL A  PROX PSV: 99.7 CM/S
BH CV LEA RIGHT PTA PRESSURE: 162 MMHG
BH CV LEA RIGHT SFA DISTAL EDV: -18.7 CM/S
BH CV LEA RIGHT SFA DISTAL PSV: -136.5 CM/S
BH CV LEA RIGHT SFA MID EDV: 39 CM/S
BH CV LEA RIGHT SFA MID PSV: 181 CM/S
BH CV LEA RIGHT SFA PROX EDV: -21.2 CM/S
BH CV LEA RIGHT SFA PROX PSV: -144.6 CM/S
BH CV LOWER ARTERIAL LEFT ABI RATIO: 1
BH CV LOWER ARTERIAL LEFT ABI RATIO: 1
BH CV LOWER ARTERIAL LEFT DORSALIS PEDIS SYS MAX: 138
BH CV LOWER ARTERIAL LEFT POST TIBIAL SYS MAX: 140
BH CV LOWER ARTERIAL RIGHT ABI RATIO: 1.1
BH CV LOWER ARTERIAL RIGHT ABI RATIO: 1.1
BH CV LOWER ARTERIAL RIGHT DORSALIS PEDIS SYS MAX: 148
BH CV LOWER ARTERIAL RIGHT POST TIBIAL SYS MAX: 162
LEFT GROIN CFA SYS: 141.4 CM/SEC
RIGHT GROIN CFA SYS: 154.8 CM/SEC
UPPER ARTERIAL LEFT ARM BRACHIAL SYS MAX: NORMAL
UPPER ARTERIAL RIGHT ARM BRACHIAL SYS MAX: NORMAL

## (undated) DEVICE — GAUZE,SPONGE,FLUFF,6"X6.75",STRL,10/TRAY: Brand: MEDLINE

## (undated) DEVICE — GLV SURG SENSICARE PI LF PF 7.5 GRN STRL

## (undated) DEVICE — NDL HYPO PRECISIONGLIDE REG 25G 1 1/2

## (undated) DEVICE — BNDG GZ SOF-FORM CONFRM 2X75IN LF STRL

## (undated) DEVICE — SYR LL TP 10ML STRL

## (undated) DEVICE — SUT ETHLN 2/0 PS 18IN 585H

## (undated) DEVICE — PATIENT RETURN ELECTRODE, SINGLE-USE, CONTACT QUALITY MONITORING, ADULT, WITH 9FT CORD, FOR PATIENTS WEIGING OVER 33LBS. (15KG): Brand: MEGADYNE

## (undated) DEVICE — PENCL SMOKE/EVAC MEGADYNE TELESCP 10FT

## (undated) DEVICE — GLV SURG BIOGEL LTX PF 7 1/2

## (undated) DEVICE — TRAP FLD MINIVAC MEGADYNE 100ML

## (undated) DEVICE — SKIN PREP TRAY 4 COMPARTM TRAY: Brand: MEDLINE INDUSTRIES, INC.

## (undated) DEVICE — PK ORTHO MINOR 40